# Patient Record
Sex: FEMALE | Race: WHITE | Employment: OTHER | URBAN - METROPOLITAN AREA
[De-identification: names, ages, dates, MRNs, and addresses within clinical notes are randomized per-mention and may not be internally consistent; named-entity substitution may affect disease eponyms.]

---

## 2017-09-05 ENCOUNTER — OFFICE VISIT (OUTPATIENT)
Dept: OBGYN | Age: 62
End: 2017-09-05

## 2017-09-05 VITALS
BODY MASS INDEX: 23.46 KG/M2 | WEIGHT: 146 LBS | DIASTOLIC BLOOD PRESSURE: 72 MMHG | SYSTOLIC BLOOD PRESSURE: 116 MMHG | HEIGHT: 66 IN

## 2017-09-05 DIAGNOSIS — Z12.11 COLON CANCER SCREENING: ICD-10-CM

## 2017-09-05 DIAGNOSIS — Z78.0 POSTMENOPAUSAL: ICD-10-CM

## 2017-09-05 DIAGNOSIS — Z01.419 WELL WOMAN EXAM WITH ROUTINE GYNECOLOGICAL EXAM: Primary | ICD-10-CM

## 2017-09-05 DIAGNOSIS — Z12.31 SCREENING MAMMOGRAM, ENCOUNTER FOR: ICD-10-CM

## 2017-09-05 DIAGNOSIS — Z11.51 SCREENING FOR HPV (HUMAN PAPILLOMAVIRUS): ICD-10-CM

## 2017-09-05 PROCEDURE — 99396 PREV VISIT EST AGE 40-64: CPT | Performed by: OBSTETRICS & GYNECOLOGY

## 2017-09-05 RX ORDER — ALPRAZOLAM 0.5 MG/1
0.5 TABLET ORAL PRN
Qty: 30 TABLET | Refills: 2 | Status: SHIPPED | OUTPATIENT
Start: 2017-09-05 | End: 2018-09-27 | Stop reason: SDUPTHER

## 2017-09-05 RX ORDER — ALPRAZOLAM 0.5 MG/1
0.5 TABLET ORAL PRN
Qty: 30 TABLET | Refills: 1 | Status: CANCELLED | OUTPATIENT
Start: 2017-09-05

## 2017-09-06 LAB — PAP SMEAR: NORMAL

## 2017-09-14 DIAGNOSIS — Z01.419 WELL WOMAN EXAM WITH ROUTINE GYNECOLOGICAL EXAM: ICD-10-CM

## 2017-09-14 DIAGNOSIS — Z11.51 SCREENING FOR HPV (HUMAN PAPILLOMAVIRUS): ICD-10-CM

## 2017-10-24 ENCOUNTER — HOSPITAL ENCOUNTER (OUTPATIENT)
Dept: WOMENS IMAGING | Age: 62
Discharge: HOME | End: 2017-10-24
Payer: COMMERCIAL

## 2017-10-24 DIAGNOSIS — Z78.0 POSTMENOPAUSAL: ICD-10-CM

## 2017-10-24 DIAGNOSIS — Z12.31 SCREENING MAMMOGRAM, ENCOUNTER FOR: ICD-10-CM

## 2017-10-24 PROCEDURE — G0202 SCR MAMMO BI INCL CAD: HCPCS

## 2017-10-24 PROCEDURE — 77080 DXA BONE DENSITY AXIAL: CPT

## 2018-09-11 ENCOUNTER — OFFICE VISIT (OUTPATIENT)
Dept: OBGYN CLINIC | Age: 63
End: 2018-09-11
Payer: MEDICARE

## 2018-09-11 VITALS
HEIGHT: 66 IN | WEIGHT: 142.2 LBS | DIASTOLIC BLOOD PRESSURE: 84 MMHG | SYSTOLIC BLOOD PRESSURE: 138 MMHG | BODY MASS INDEX: 22.85 KG/M2

## 2018-09-11 DIAGNOSIS — Z00.00 PERIODIC HEALTH ASSESSMENT, GENERAL SCREENING, ADULT: ICD-10-CM

## 2018-09-11 DIAGNOSIS — Z12.31 SCREENING MAMMOGRAM, ENCOUNTER FOR: ICD-10-CM

## 2018-09-11 DIAGNOSIS — Z11.51 SCREENING FOR HPV (HUMAN PAPILLOMAVIRUS): ICD-10-CM

## 2018-09-11 DIAGNOSIS — Z01.419 WELL WOMAN EXAM WITH ROUTINE GYNECOLOGICAL EXAM: Primary | ICD-10-CM

## 2018-09-11 PROCEDURE — G0101 CA SCREEN;PELVIC/BREAST EXAM: HCPCS | Performed by: OBSTETRICS & GYNECOLOGY

## 2018-09-11 ASSESSMENT — PATIENT HEALTH QUESTIONNAIRE - PHQ9
1. LITTLE INTEREST OR PLEASURE IN DOING THINGS: 0
SUM OF ALL RESPONSES TO PHQ QUESTIONS 1-9: 0
SUM OF ALL RESPONSES TO PHQ9 QUESTIONS 1 & 2: 0
2. FEELING DOWN, DEPRESSED OR HOPELESS: 0
SUM OF ALL RESPONSES TO PHQ QUESTIONS 1-9: 0

## 2018-09-12 ASSESSMENT — ENCOUNTER SYMPTOMS
COUGH: 0
WHEEZING: 0
CONSTIPATION: 0
BLOOD IN STOOL: 0
VOMITING: 0
COLOR CHANGE: 0
SINUS PRESSURE: 0
SHORTNESS OF BREATH: 0
ABDOMINAL PAIN: 0
NAUSEA: 0

## 2018-09-12 NOTE — PROGRESS NOTES
History and Physical  [facility]   [address]  P: [phone] / F: [phone]  Nida Jarquin  2020              70 y.o. Chief Complaint   Patient presents with    Annual Exam     last pap 17,wnl     Student       /84   Ht 5' 6\" (1.676 m)   Wt 142 lb 3.2 oz (64.5 kg)   BMI 22.95 kg/m²   Alllergies:  Demerol hcl [meperidine]               Primary Care Physician: Babak Davila MD    HPI : Nida Jarquin is a 70 y.o. female [id number] The patient was seen and examined. She has no chief complaint today and is here for her annual exam.  Her bowels are regular. There are no voiding complaints. She denies any bloating. She denies vaginal discharge and was counseled on STD's and the need for barrier contraception. All ?s answered.      ________________________________________________________________________  Obstetric History       T2      L2     SAB1   TAB0   Ectopic0   Molar0   Multiple0   Live Births0       # Outcome Date GA Lbr Tigre/2nd Weight Sex Delivery Anes PTL Lv   3 SAB            2 Term      Vag-Spont      1 Term      Vag-Spont           Past Medical History:   Diagnosis Date    Claustrophobia     Hair loss     Menopause     Multiple lipomas     LIPOMAS/CYST ON HAND                                                                   Past Surgical History:   Procedure Laterality Date    ABDOMINOPLASTY      2004    CATHETER REMOVAL      FIBULA FRACTURE SURGERY      [id number]    FOOT SURGERY      various    FRACTURE SURGERY      right thumb    HIP SURGERY      replacement  right    TUBAL LIGATION      1986    WRIST SURGERY      2 screws     Family History   Problem Relation Age of Onset    Heart Surgery Father         cabg     Social History     Social History    Marital status:      Spouse name: N/A    Number of children: N/A    Years of education: N/A     Occupational History    dentist      Social History Main Topics    Smoking status: Never Smoker    Smokeless tobacco: Never Used    Alcohol use Not on file    Drug use: Unknown    Sexual activity: Yes     Partners: Male     Other Topics Concern    Not on file     Social History Narrative    No narrative on file         MEDICATIONS:  Current Outpatient Prescriptions on File Prior to Visit   Medication Sig Dispense Refill    ALPRAZolam (XANAX) 0.5 MG tablet Take 1 tablet by mouth as needed for Sleep 30 tablet 2    MILK THISTLE PO Take by mouth      Multiple Vitamins-Minerals (B COMPLEX VITAMINS PLUS) TABS Take  by mouth.  Biotin 2500 MCG CAPS Take 5,000 mcg by mouth.  Flaxseed MISC Take one(1) tablet daily.  Chondroitin Sulfate A 150 MG CAPS Take  by mouth.  Ferrous Sulfate (IRON) 325 (65 FE) MG TABS Take  by mouth.  Multiple Vitamin TABS Take  by mouth. No current facility-administered medications on file prior to visit. ALLERGIES:  Allergies as of 09/11/2018 - Review Complete 09/11/2018   Allergen Reaction Noted    Demerol hcl [meperidine] Anaphylaxis 01/13/2015           Gynecologic History:     No LMP recorded. Patient is postmenopausal.      ________________________________________________________________________  REVIEW OF SYSTEMS:       Review of Systems   Constitutional: Negative for chills, fatigue, fever and unexpected weight change. HENT: Negative for hearing loss, sinus pressure and tinnitus. Eyes: Negative for visual disturbance. Respiratory: Negative for cough, shortness of breath and wheezing. Cardiovascular: Negative for chest pain, palpitations and leg swelling. Gastrointestinal: Negative for abdominal pain, blood in stool, constipation, nausea and vomiting. Genitourinary: Negative for difficulty urinating, dysuria, flank pain, hematuria, pelvic pain and vaginal discharge. Musculoskeletal: Negative for arthralgias and neck stiffness.    Skin: Negative for color change, pallor and rash. Allergic/Immunologic: Negative for food allergies. Neurological: Negative for dizziness, speech difficulty, weakness and numbness. Psychiatric/Behavioral: Negative for behavioral problems, self-injury and suicidal ideas. The patient is not nervous/anxious. PHYSICAL Exam:    Constitutional:  Vitals:    09/11/18 0848   BP: 138/84   Weight: 142 lb 3.2 oz (64.5 kg)   Height: 5' 6\" (1.676 m)       Physical Exam   Constitutional: She is oriented to person, place, and time. She appears well-developed and well-nourished. HENT:   Head: Normocephalic and atraumatic. Cardiovascular: Normal rate and regular rhythm. Abdominal: Soft. Normal appearance. There is no tenderness. Musculoskeletal: Normal range of motion. Neurological: She is alert and oriented to person, place, and time. Skin: Skin is warm and intact. No lesion noted. No erythema. Psychiatric: She has a normal mood and affect. Her behavior is normal. Judgment and thought content normal.         ASSESSMENT:      70 y.o. Annual   Diagnosis Orders   1. Well woman exam with routine gynecological exam  PAP SMEAR   2. Screening for HPV (human papillomavirus)  PAP SMEAR   3. Screening mammogram, encounter for  FRANNY DIGITAL SCREEN W CAD BILATERAL   4. Periodic health assessment, general screening, adult  Comprehensive Metabolic Panel    CBC Auto Differential    Lipid Panel    T4, Free    TSH without Reflex    Hemoglobin A1C                  Hereditary Breast, Ovarian, Colon and Uterine Cancer screening Done. Tobacco & Secondary smoke risks reviewed; instructed on cessation and avoidance      Counseling Completed:          PLAN:    Return in about 1 year (around 9/11/2019) for annual.  Repeat Annual every 1 year  Cervical Cytology Evaluation begins at 24years old.   If Future     Standing Expiration Date:   9/11/2019     Order Specific Question:   Is Patient Fasting?/# of Hours     Answer:   8    T4, Free     Standing Status:   Future     Standing Expiration Date:   9/11/2019    TSH without Reflex     Standing Status:   Future     Standing Expiration Date:   9/11/2019    Hemoglobin A1C     Standing Status:   Future     Standing Expiration Date:   9/11/2019     No orders of the defined types were placed in this encounter. IHollie, am scribing for, and in the presence of, Dr Rena Crawley. Electronically signed by: Hollie Hogan 9/12/18 10:06 AM    IDr Rena, personally performed the services described in this documentation, as scribed by Hollie Hogan in my presence, and it is both accurate and complete.  Electronically signed by: Rena Crawley MD 9/12/18 10:06 AM

## 2018-09-20 DIAGNOSIS — Z01.419 WELL WOMAN EXAM WITH ROUTINE GYNECOLOGICAL EXAM: ICD-10-CM

## 2018-09-20 DIAGNOSIS — Z11.51 SCREENING FOR HPV (HUMAN PAPILLOMAVIRUS): ICD-10-CM

## 2018-09-27 DIAGNOSIS — F41.9 ANXIETY: Primary | ICD-10-CM

## 2018-10-01 RX ORDER — ALPRAZOLAM 0.5 MG/1
0.5 TABLET ORAL PRN
Qty: 30 TABLET | Refills: 2 | Status: SHIPPED | OUTPATIENT
Start: 2018-10-01 | End: 2021-10-05 | Stop reason: SDUPTHER

## 2019-09-18 ENCOUNTER — OFFICE VISIT (OUTPATIENT)
Dept: OBGYN CLINIC | Age: 64
End: 2019-09-18

## 2019-09-18 VITALS
DIASTOLIC BLOOD PRESSURE: 72 MMHG | BODY MASS INDEX: 21.83 KG/M2 | HEIGHT: 65 IN | SYSTOLIC BLOOD PRESSURE: 100 MMHG | WEIGHT: 131 LBS

## 2019-09-18 DIAGNOSIS — F32.A DEPRESSION, UNSPECIFIED DEPRESSION TYPE: ICD-10-CM

## 2019-09-18 DIAGNOSIS — F51.02 ADJUSTMENT INSOMNIA: ICD-10-CM

## 2019-09-18 DIAGNOSIS — Z01.419 ENCOUNTER FOR WELL WOMAN EXAM: Primary | ICD-10-CM

## 2019-09-18 DIAGNOSIS — L65.9 HAIR LOSS: ICD-10-CM

## 2019-09-18 DIAGNOSIS — Z12.31 SCREENING MAMMOGRAM, ENCOUNTER FOR: ICD-10-CM

## 2019-09-18 PROCEDURE — G0101 CA SCREEN;PELVIC/BREAST EXAM: HCPCS | Performed by: OBSTETRICS & GYNECOLOGY

## 2019-09-18 RX ORDER — ALPRAZOLAM 0.5 MG/1
0.5 TABLET ORAL 3 TIMES DAILY PRN
Qty: 30 TABLET | Refills: 3 | Status: SHIPPED | OUTPATIENT
Start: 2019-09-18 | End: 2020-09-30 | Stop reason: SDUPTHER

## 2019-09-18 RX ORDER — LISINOPRIL 10 MG/1
TABLET ORAL
Refills: 3 | COMMUNITY
Start: 2019-07-09

## 2019-09-18 RX ORDER — PROPAFENONE HYDROCHLORIDE 150 MG/1
TABLET, FILM COATED ORAL
Refills: 11 | COMMUNITY
Start: 2019-09-02 | End: 2020-09-29

## 2019-09-18 RX ORDER — APIXABAN 5 MG/1
TABLET, FILM COATED ORAL
Refills: 11 | COMMUNITY
Start: 2019-08-07

## 2019-09-18 ASSESSMENT — ENCOUNTER SYMPTOMS
ALLERGIC/IMMUNOLOGIC NEGATIVE: 1
CONSTIPATION: 0
RECTAL PAIN: 0
NAUSEA: 0
EYES NEGATIVE: 1
ANAL BLEEDING: 0
BLOOD IN STOOL: 0
RESPIRATORY NEGATIVE: 1
ABDOMINAL PAIN: 0
ABDOMINAL DISTENTION: 0
DIARRHEA: 0
VOMITING: 0

## 2019-09-18 ASSESSMENT — PATIENT HEALTH QUESTIONNAIRE - PHQ9
1. LITTLE INTEREST OR PLEASURE IN DOING THINGS: 0
SUM OF ALL RESPONSES TO PHQ QUESTIONS 1-9: 0
SUM OF ALL RESPONSES TO PHQ9 QUESTIONS 1 & 2: 0
SUM OF ALL RESPONSES TO PHQ QUESTIONS 1-9: 0
2. FEELING DOWN, DEPRESSED OR HOPELESS: 0

## 2019-09-18 NOTE — PROGRESS NOTES
Cardiovascular: Negative. Gastrointestinal: Negative for abdominal distention, abdominal pain, anal bleeding, blood in stool, constipation, diarrhea, nausea, rectal pain and vomiting. Endocrine: Negative. Genitourinary: Negative for decreased urine volume, difficulty urinating, dyspareunia, dysuria, enuresis, flank pain, frequency, genital sores, hematuria, menstrual problem, pelvic pain, urgency, vaginal bleeding, vaginal discharge and vaginal pain. Musculoskeletal: Negative. Skin: Negative. Allergic/Immunologic: Negative. Neurological: Negative. Hematological: Negative. Psychiatric/Behavioral: Negative.    + hair loss and insomnia    Objective:     Physical Exam   Constitutional: She is oriented to person, place, and time. She appears well-developed and well-nourished. HENT:   Head: Normocephalic. Neck: Normal range of motion. Neck supple. No thyromegaly present. Cardiovascular: Normal rate, regular rhythm and normal heart sounds. Pulmonary/Chest: Effort normal and breath sounds normal. No respiratory distress. She has no wheezes. She has no rales. She exhibits no tenderness. Right breast exhibits no mass, no nipple discharge, no skin change and no tenderness. Left breast exhibits no mass, no nipple discharge, no skin change and no tenderness. No breast swelling, tenderness, discharge or bleeding. Abdominal: Soft. Bowel sounds are normal. She exhibits no distension and no mass. There is no tenderness. There is no rebound and no guarding. Hernia confirmed negative in the right inguinal area and confirmed negative in the left inguinal area. Genitourinary: Rectum normal, vagina normal and uterus normal. No breast swelling, tenderness, discharge or bleeding. No labial fusion. There is no rash, tenderness, lesion or injury on the right labia. There is no rash, tenderness, lesion or injury on the left labia. Uterus is not deviated, not enlarged, not fixed and not tender.  Cervix exhibits no motion tenderness, no discharge and no friability. Right adnexum displays no mass, no tenderness and no fullness. Left adnexum displays no mass, no tenderness and no fullness. No erythema, tenderness or bleeding in the vagina. No foreign body in the vagina. No signs of injury around the vagina. No vaginal discharge found. Musculoskeletal: Normal range of motion. She exhibits no edema or tenderness. Lymphadenopathy:     She has no cervical adenopathy. Right: No inguinal adenopathy present. Left: No inguinal adenopathy present. Neurological: She is alert and oriented to person, place, and time. Skin: Skin is warm and dry. No rash noted. No erythema. No pallor. Psychiatric: She has a normal mood and affect. Her behavior is normal. Judgment and thought content normal.       Assessment:      Diagnosis Orders   1. Encounter for well woman exam     2. Screening mammogram, encounter for  FRANNY DIGITAL SCREEN W CAD BILATERAL   3. Depression, unspecified depression type  ALPRAZolam (XANAX) 0.5 MG tablet   4. Hair loss     5. Adjustment insomnia           Plan:      Medications placedthis encounter:  Orders Placed This Encounter   Medications    ALPRAZolam (XANAX) 0.5 MG tablet     Sig: Take 1 tablet by mouth 3 times daily as needed for Sleep or Anxiety for up to 30 days. Dispense:  30 tablet     Refill:  3         Orders placedthis encounter:  Orders Placed This Encounter   Procedures    FRANNY DIGITAL SCREEN W CAD BILATERAL     Standing Status:   Future     Standing Expiration Date:   9/17/2020         Follow up:  Return in about 1 year (around 9/18/2020) for Annual.   Repeat Annual GYN exam every 1 year. Cervical Cytology exam starts age 24. If Negative Cytology;  follow-up screening per current guidelines. Mammograms yearly starting at age 36. Calcium and Vitamin D dosing reviewed ( age appropriate ). Colonoscopy and bone density screening discussed ( age appropriate ). Birth control and STD prevention discussed ( age appropriate ). Gardisil counseling completed for all patients 7-33 yo. Routine health maintenance ( per PCP and guidelines ).

## 2019-10-24 ENCOUNTER — TELEPHONE (OUTPATIENT)
Dept: OBGYN CLINIC | Age: 64
End: 2019-10-24

## 2019-10-25 DIAGNOSIS — Z12.11 ENCOUNTER FOR SCREENING COLONOSCOPY: Primary | ICD-10-CM

## 2019-12-18 ENCOUNTER — TELEPHONE (OUTPATIENT)
Dept: ADMINISTRATIVE | Age: 64
End: 2019-12-18

## 2020-07-01 ENCOUNTER — HOSPITAL ENCOUNTER (OUTPATIENT)
Dept: WOMENS IMAGING | Age: 65
Discharge: HOME | End: 2020-07-03
Payer: MEDICARE

## 2020-07-01 PROCEDURE — 77067 SCR MAMMO BI INCL CAD: CPT

## 2020-09-29 ENCOUNTER — OFFICE VISIT (OUTPATIENT)
Dept: OBGYN CLINIC | Age: 65
End: 2020-09-29
Payer: MEDICARE

## 2020-09-29 VITALS — DIASTOLIC BLOOD PRESSURE: 72 MMHG | SYSTOLIC BLOOD PRESSURE: 122 MMHG | BODY MASS INDEX: 23.13 KG/M2 | WEIGHT: 139 LBS

## 2020-09-29 PROCEDURE — G0101 CA SCREEN;PELVIC/BREAST EXAM: HCPCS | Performed by: OBSTETRICS & GYNECOLOGY

## 2020-09-29 RX ORDER — ROSUVASTATIN CALCIUM 10 MG/1
TABLET, COATED ORAL
COMMUNITY
Start: 2020-08-24

## 2020-09-29 RX ORDER — OXYCODONE HYDROCHLORIDE AND ACETAMINOPHEN 5; 325 MG/1; MG/1
TABLET ORAL
COMMUNITY
Start: 2020-09-16 | End: 2021-10-05

## 2020-09-29 RX ORDER — GABAPENTIN 100 MG/1
CAPSULE ORAL
COMMUNITY
Start: 2020-09-24 | End: 2021-10-05

## 2020-09-29 ASSESSMENT — ENCOUNTER SYMPTOMS
CONSTIPATION: 0
EYES NEGATIVE: 1
ABDOMINAL PAIN: 0
VOMITING: 0
RECTAL PAIN: 0
ANAL BLEEDING: 0
ABDOMINAL DISTENTION: 0
ALLERGIC/IMMUNOLOGIC NEGATIVE: 1
RESPIRATORY NEGATIVE: 1
DIARRHEA: 0
NAUSEA: 0
BLOOD IN STOOL: 0

## 2020-09-29 ASSESSMENT — PATIENT HEALTH QUESTIONNAIRE - PHQ9
2. FEELING DOWN, DEPRESSED OR HOPELESS: 0
SUM OF ALL RESPONSES TO PHQ9 QUESTIONS 1 & 2: 0
SUM OF ALL RESPONSES TO PHQ QUESTIONS 1-9: 0
1. LITTLE INTEREST OR PLEASURE IN DOING THINGS: 0
SUM OF ALL RESPONSES TO PHQ QUESTIONS 1-9: 0

## 2020-09-29 ASSESSMENT — VISUAL ACUITY: OU: 1

## 2020-09-29 NOTE — PROGRESS NOTES
Subjective:      Patient ID: Danielle Horton is a 77 y.o. female    Annual exam.  No PMB. No GYN complaints. Pap deferred. Screening mammogram ordered. Monthly SBE encouraged. Dexa scan ordered per protocol. Screening colonoscopy recommended per routine. F/U annual exam or prn. Vitals: There were no vitals taken for this visit. Past Medical History:   Diagnosis Date    Atrial fibrillation ([address]     Claustrophobia     Hair loss     Menopause     Multiple lipomas     LIPOMAS/CYST ON HAND     Past Surgical History:   Procedure Laterality Date    ABDOMINOPLASTY      2004    CATHETER REMOVAL      FIBULA FRACTURE SURGERY      [id number]    FOOT SURGERY      various    FRACTURE SURGERY      right thumb    HIP SURGERY      replacement 2006 right    TUBAL LIGATION      1986    WRIST SURGERY      2 screws     Allergies:  Demerol hcl [meperidine]  Current Outpatient Medications   Medication Sig Dispense Refill    KRILL OIL PO Take by mouth      lisinopril (PRINIVIL;ZESTRIL) 10 MG tablet TAKE 1 TABLET BY MOUTH DAILY  3    propafenone (RYTHMOL) 150 MG tablet TAKE 1 TABLET EVERY 8 HOURS DAILY. 11    ELIQUIS 5 MG TABS tablet TAKE 1 TABLET BY MOUTH TWICE DAILY  11    TURMERIC PO Take by mouth      Multiple Vitamins-Minerals (B COMPLEX VITAMINS PLUS) TABS Take  by mouth.  Biotin 2500 MCG CAPS Take 5,000 mcg by mouth.  Ferrous Sulfate (IRON) 325 (65 FE) MG TABS Take  by mouth.  Multiple Vitamin TABS Take  by mouth. No current facility-administered medications for this visit. Social History     Socioeconomic History    Marital status:       Spouse name: Not on file    Number of children: Not on file    Years of education: Not on file    Highest education level: Not on file   Occupational History    Occupation: dentist   Social Needs    Financial resource strain: Not on file    Food insecurity     Worry: Not on file     Inability: Not on file   [email or web address] needs Medical: Not on file     Non-medical: Not on file   Tobacco Use    Smoking status: Never Smoker    Smokeless tobacco: Never Used   Substance and Sexual Activity    Alcohol use: Not on file    Drug use: Not on file    Sexual activity: Yes     Partners: Male   Lifestyle    Physical activity     Days per week: Not on file     Minutes per session: Not on file    Stress: Not on file   Relationships    Social connections     Talks on phone: Not on file     Gets together: Not on file     Attends Rastafarian service: Not on file     Active member of club or organization: Not on file     Attends meetings of clubs or organizations: Not on file     Relationship status: Not on file    Intimate partner violence     Fear of current or ex partner: Not on file     Emotionally abused: Not on file     Physically abused: Not on file     Forced sexual activity: Not on file   Other Topics Concern    Not on file   Social History Narrative    Not on file     Family History   Problem Relation Age of Onset    Heart Surgery Father         cabg       Review of Systems   Constitutional: Negative. Negative for activity change, appetite change, chills, diaphoresis, fatigue, fever and unexpected weight change. HENT: Negative. Eyes: Negative. Respiratory: Negative. Cardiovascular: Negative. Gastrointestinal: Negative for abdominal distention, abdominal pain, anal bleeding, blood in stool, constipation, diarrhea, nausea, rectal pain and vomiting. Endocrine: Negative. Genitourinary: Negative for decreased urine volume, difficulty urinating, dyspareunia, dysuria, enuresis, flank pain, frequency, genital sores, hematuria, menstrual problem, pelvic pain, urgency, vaginal bleeding, vaginal discharge and vaginal pain. Musculoskeletal: Negative. Skin: Negative. Allergic/Immunologic: Negative. Neurological: Negative. Hematological: Negative. Psychiatric/Behavioral: Negative.         Objective:     Physical

## 2020-09-29 NOTE — PROGRESS NOTES
The patient was asked if she would like a chaperone present for her intimate exam. She  declines the chaperone.  Danial

## 2020-09-30 RX ORDER — ALPRAZOLAM 0.5 MG/1
0.5 TABLET ORAL 3 TIMES DAILY PRN
Qty: 30 TABLET | Refills: 3 | Status: SHIPPED | OUTPATIENT
Start: 2020-09-30 | End: 2020-10-30

## 2020-11-06 ENCOUNTER — HOSPITAL ENCOUNTER (OUTPATIENT)
Dept: WOMENS IMAGING | Age: 65
Discharge: HOME | End: 2020-11-08
Payer: MEDICARE

## 2020-11-06 PROCEDURE — 77080 DXA BONE DENSITY AXIAL: CPT

## 2020-12-01 ENCOUNTER — OFFICE VISIT (OUTPATIENT)
Dept: OBGYN CLINIC | Age: 65
End: 2020-12-01
Payer: MEDICARE

## 2020-12-01 VITALS — BODY MASS INDEX: 23.13 KG/M2 | HEIGHT: 65 IN | SYSTOLIC BLOOD PRESSURE: 132 MMHG | DIASTOLIC BLOOD PRESSURE: 88 MMHG

## 2020-12-01 DIAGNOSIS — Z12.31 SCREENING MAMMOGRAM, ENCOUNTER FOR: ICD-10-CM

## 2020-12-01 DIAGNOSIS — Z00.00 PERIODIC HEALTH ASSESSMENT, GENERAL SCREENING, ADULT: ICD-10-CM

## 2020-12-01 LAB
ALBUMIN SERPL-MCNC: 4.5 G/DL (ref 3.5–4.6)
ALP BLD-CCNC: 102 U/L (ref 40–130)
ALT SERPL-CCNC: 20 U/L (ref 0–33)
ANION GAP SERPL CALCULATED.3IONS-SCNC: 11 MEQ/L (ref 9–15)
AST SERPL-CCNC: 30 U/L (ref 0–35)
BASOPHILS ABSOLUTE: 0.1 K/UL (ref 0–0.2)
BASOPHILS RELATIVE PERCENT: 1.3 %
BILIRUB SERPL-MCNC: 0.7 MG/DL (ref 0.2–0.7)
BUN BLDV-MCNC: 12 MG/DL (ref 8–23)
CALCIUM SERPL-MCNC: 10 MG/DL (ref 8.5–9.9)
CHLORIDE BLD-SCNC: 96 MEQ/L (ref 95–107)
CHOLESTEROL, TOTAL: 221 MG/DL (ref 0–199)
CO2: 27 MEQ/L (ref 20–31)
CREAT SERPL-MCNC: 0.62 MG/DL (ref 0.5–0.9)
EOSINOPHILS ABSOLUTE: 0.2 K/UL (ref 0–0.7)
EOSINOPHILS RELATIVE PERCENT: 3.5 %
GFR AFRICAN AMERICAN: >60
GFR NON-AFRICAN AMERICAN: >60
GLOBULIN: 2.3 G/DL (ref 2.3–3.5)
GLUCOSE BLD-MCNC: 56 MG/DL (ref 70–99)
HCT VFR BLD CALC: 39.5 % (ref 37–47)
HDLC SERPL-MCNC: 133 MG/DL (ref 40–59)
HEMOGLOBIN: 13.4 G/DL (ref 12–16)
LDL CHOLESTEROL CALCULATED: 77 MG/DL (ref 0–129)
LYMPHOCYTES ABSOLUTE: 1.4 K/UL (ref 1–4.8)
LYMPHOCYTES RELATIVE PERCENT: 22.9 %
MCH RBC QN AUTO: 32.3 PG (ref 27–31.3)
MCHC RBC AUTO-ENTMCNC: 33.8 % (ref 33–37)
MCV RBC AUTO: 95.5 FL (ref 82–100)
MONOCYTES ABSOLUTE: 0.7 K/UL (ref 0.2–0.8)
MONOCYTES RELATIVE PERCENT: 11.4 %
NEUTROPHILS ABSOLUTE: 3.8 K/UL (ref 1.4–6.5)
NEUTROPHILS RELATIVE PERCENT: 60.9 %
PDW BLD-RTO: 14.3 % (ref 11.5–14.5)
PLATELET # BLD: 334 K/UL (ref 130–400)
POTASSIUM SERPL-SCNC: 4.9 MEQ/L (ref 3.4–4.9)
RBC # BLD: 4.13 M/UL (ref 4.2–5.4)
SODIUM BLD-SCNC: 134 MEQ/L (ref 135–144)
TOTAL PROTEIN: 6.8 G/DL (ref 6.3–8)
TRIGL SERPL-MCNC: 56 MG/DL (ref 0–150)
WBC # BLD: 6.2 K/UL (ref 4.8–10.8)

## 2020-12-01 PROCEDURE — 1090F PRES/ABSN URINE INCON ASSESS: CPT | Performed by: OBSTETRICS & GYNECOLOGY

## 2020-12-01 PROCEDURE — 3017F COLORECTAL CA SCREEN DOC REV: CPT | Performed by: OBSTETRICS & GYNECOLOGY

## 2020-12-01 PROCEDURE — G8427 DOCREV CUR MEDS BY ELIG CLIN: HCPCS | Performed by: OBSTETRICS & GYNECOLOGY

## 2020-12-01 PROCEDURE — 1123F ACP DISCUSS/DSCN MKR DOCD: CPT | Performed by: OBSTETRICS & GYNECOLOGY

## 2020-12-01 PROCEDURE — 1036F TOBACCO NON-USER: CPT | Performed by: OBSTETRICS & GYNECOLOGY

## 2020-12-01 PROCEDURE — 99213 OFFICE O/P EST LOW 20 MIN: CPT | Performed by: OBSTETRICS & GYNECOLOGY

## 2020-12-01 PROCEDURE — 4040F PNEUMOC VAC/ADMIN/RCVD: CPT | Performed by: OBSTETRICS & GYNECOLOGY

## 2020-12-01 PROCEDURE — G8484 FLU IMMUNIZE NO ADMIN: HCPCS | Performed by: OBSTETRICS & GYNECOLOGY

## 2020-12-01 PROCEDURE — G8420 CALC BMI NORM PARAMETERS: HCPCS | Performed by: OBSTETRICS & GYNECOLOGY

## 2020-12-01 PROCEDURE — G8399 PT W/DXA RESULTS DOCUMENT: HCPCS | Performed by: OBSTETRICS & GYNECOLOGY

## 2020-12-01 RX ORDER — ALENDRONATE SODIUM 35 MG/1
35 TABLET ORAL
Qty: 12 TABLET | Refills: 3 | Status: SHIPPED | OUTPATIENT
Start: 2020-12-01 | End: 2022-10-18 | Stop reason: ALTCHOICE

## 2020-12-01 ASSESSMENT — ENCOUNTER SYMPTOMS
NAUSEA: 0
ABDOMINAL DISTENTION: 0
VOMITING: 0
ABDOMINAL PAIN: 0
ALLERGIC/IMMUNOLOGIC NEGATIVE: 1
RECTAL PAIN: 0
CONSTIPATION: 0
EYES NEGATIVE: 1
DIARRHEA: 0
ANAL BLEEDING: 0
RESPIRATORY NEGATIVE: 1
BLOOD IN STOOL: 0

## 2020-12-01 NOTE — PROGRESS NOTES
Subjective:      Patient ID: Magdy Bailey is a 77 y.o. female    Pt here to discuss Dexa results. Lowest T score -2.5 ( average -2.3 ).  2017 avergae T score -1.7. Discussed osteopenia and risks of spontaneous fracture from osteopenia/osteoprorosis. Discussed prevention measures including Vitamin D and Calcium supplements, weight bearing exercise and meds with risks and benefits. Pt has opted to start Boniva as directed. F/U annual exam or prn. Vitals:  /88   Ht 5' 5\" (1.651 m)   BMI 23.13 kg/m²   Past Medical History:   Diagnosis Date    Atrial fibrillation (HCC)     Claustrophobia     Hair loss     Menopause     Multiple lipomas     LIPOMAS/CYST ON HAND     Past Surgical History:   Procedure Laterality Date    ABDOMINOPLASTY      2004    CATHETER REMOVAL      FIBULA FRACTURE SURGERY      [id number]    FOOT SURGERY      various    FRACTURE SURGERY      right thumb    HIP SURGERY      replacement 2006 right    KNEE SURGERY  09/15/2020    left knee    TUBAL LIGATION      1986    WRIST SURGERY      2 screws     Allergies:  Demerol hcl [meperidine]  Current Outpatient Medications   Medication Sig Dispense Refill    alendronate (FOSAMAX) 35 MG tablet Take 1 tablet by mouth every 7 days 12 tablet 3    oxyCODONE-acetaminophen (PERCOCET) 5-325 MG per tablet TAKE 1 TABLET EVERY 6 HOURS AS NEEDED FOR PAIN FOR 10 DAYS max 4 tablets per day      rosuvastatin (CRESTOR) 10 MG tablet TAKE 1 TABLET BY MOUTH TWICE A WEEK      gabapentin (NEURONTIN) 100 MG capsule TAKE 1 CAPSULE 3 times daily      KRILL OIL PO Take by mouth      lisinopril (PRINIVIL;ZESTRIL) 10 MG tablet TAKE 1 TABLET BY MOUTH DAILY  3    ELIQUIS 5 MG TABS tablet TAKE 1 TABLET BY MOUTH TWICE DAILY  11    TURMERIC PO Take by mouth      Biotin 2500 MCG CAPS Take 5,000 mcg by mouth.  Ferrous Sulfate (IRON) 325 (65 FE) MG TABS Take  by mouth. No current facility-administered medications for this visit.       Social History     Socioeconomic History    Marital status:      Spouse name: Not on file    Number of children: Not on file    Years of education: Not on file    Highest education level: Not on file   Occupational History    Occupation: dentist   Social Needs    Financial resource strain: Not on file    Food insecurity     Worry: Not on file     Inability: Not on file    Transportation needs     Medical: Not on file     Non-medical: Not on file   Tobacco Use    Smoking status: Never Smoker    Smokeless tobacco: Never Used   Substance and Sexual Activity    Alcohol use: Not Currently    Drug use: Yes     Types: Opiates      Comment: oxycodone for knee    Sexual activity: Not Currently   Lifestyle    Physical activity     Days per week: Not on file     Minutes per session: Not on file    Stress: Not on file   Relationships    Social connections     Talks on phone: Not on file     Gets together: Not on file     Attends Religion service: Not on file     Active member of club or organization: Not on file     Attends meetings of clubs or organizations: Not on file     Relationship status: Not on file    Intimate partner violence     Fear of current or ex partner: Not on file     Emotionally abused: Not on file     Physically abused: Not on file     Forced sexual activity: Not on file   Other Topics Concern    Not on file   Social History Narrative    Not on file     Family History   Problem Relation Age of Onset    Heart Surgery Father         cabg       Review of Systems   Constitutional: Negative. Negative for activity change, appetite change, chills, diaphoresis, fatigue, fever and unexpected weight change. HENT: Negative. Eyes: Negative. Respiratory: Negative. Cardiovascular: Negative. Gastrointestinal: Negative for abdominal distention, abdominal pain, anal bleeding, blood in stool, constipation, diarrhea, nausea, rectal pain and vomiting. Endocrine: Negative.     Genitourinary: Negative for decreased urine volume, difficulty urinating, dyspareunia, dysuria, enuresis, flank pain, frequency, genital sores, hematuria, menstrual problem, pelvic pain, urgency, vaginal bleeding, vaginal discharge and vaginal pain. Musculoskeletal: Negative. Skin: Negative. Allergic/Immunologic: Negative. Neurological: Negative. Hematological: Negative. Psychiatric/Behavioral: Negative. Objective:     Physical Exam  Constitutional:       General: She is not in acute distress. Appearance: She is well-developed. She is not diaphoretic. HENT:      Head: Normocephalic and atraumatic. Eyes:      Conjunctiva/sclera: Conjunctivae normal.   Neck:      Musculoskeletal: Normal range of motion and neck supple. Cardiovascular:      Rate and Rhythm: Normal rate and regular rhythm. Pulmonary:      Effort: Pulmonary effort is normal. No respiratory distress. Musculoskeletal: Normal range of motion. General: No tenderness or deformity. Skin:     General: Skin is warm and dry. Coloration: Skin is not pale. Neurological:      Mental Status: She is alert and oriented to person, place, and time. Motor: No abnormal muscle tone. Coordination: Coordination normal.   Psychiatric:         Behavior: Behavior normal.         Thought Content: Thought content normal.         Judgment: Judgment normal.         Assessment:       Diagnosis Orders   1. Osteopenia, unspecified location     2.  Periodic health assessment, general screening, adult  CBC Auto Differential    Comprehensive Metabolic Panel    Lipid Panel        Plan:      Medications placedthis encounter:  Orders Placed This Encounter   Medications    alendronate (FOSAMAX) 35 MG tablet     Sig: Take 1 tablet by mouth every 7 days     Dispense:  12 tablet     Refill:  3         Orders placedthis encounter:  Orders Placed This Encounter   Procedures    CBC Auto Differential     Standing Status:   Future     Standing Expiration Date:   12/1/2021    Comprehensive Metabolic Panel     Standing Status:   Future     Standing Expiration Date:   12/1/2021    Lipid Panel     Standing Status:   Future     Standing Expiration Date:   12/1/2021     Order Specific Question:   Is Patient Fasting?/# of Hours     Answer:   12         Follow up:  Return for Annual.

## 2020-12-11 ENCOUNTER — TELEPHONE (OUTPATIENT)
Dept: OBGYN CLINIC | Age: 65
End: 2020-12-11

## 2020-12-11 NOTE — TELEPHONE ENCOUNTER
LMOM for pt to c/b to make aware cholesterol and HDL is elevated and needs to see PCP, please find out PCP and will fax records over

## 2020-12-11 NOTE — TELEPHONE ENCOUNTER
Patient returning call and was made aware of results. Pateint requesting a copy be mailed to her home address. Patient confirmed her PCP Philip Macario on file is correct.

## 2021-07-20 ENCOUNTER — HOSPITAL ENCOUNTER (OUTPATIENT)
Dept: WOMENS IMAGING | Age: 66
Discharge: HOME | End: 2021-07-22
Payer: MEDICARE

## 2021-07-20 VITALS — HEIGHT: 65 IN | BODY MASS INDEX: 23.13 KG/M2

## 2021-07-20 DIAGNOSIS — Z12.31 SCREENING MAMMOGRAM, ENCOUNTER FOR: ICD-10-CM

## 2021-07-20 PROCEDURE — 77063 BREAST TOMOSYNTHESIS BI: CPT

## 2021-10-05 ENCOUNTER — OFFICE VISIT (OUTPATIENT)
Dept: OBGYN CLINIC | Age: 66
End: 2021-10-05
Payer: MEDICARE

## 2021-10-05 VITALS
BODY MASS INDEX: 24.59 KG/M2 | HEIGHT: 64 IN | DIASTOLIC BLOOD PRESSURE: 70 MMHG | SYSTOLIC BLOOD PRESSURE: 118 MMHG | WEIGHT: 144 LBS

## 2021-10-05 DIAGNOSIS — Z01.419 ENCOUNTER FOR WELL WOMAN EXAM WITH ROUTINE GYNECOLOGICAL EXAM: Primary | ICD-10-CM

## 2021-10-05 DIAGNOSIS — Z12.31 SCREENING MAMMOGRAM, ENCOUNTER FOR: ICD-10-CM

## 2021-10-05 DIAGNOSIS — F32.A DEPRESSION, UNSPECIFIED DEPRESSION TYPE: ICD-10-CM

## 2021-10-05 DIAGNOSIS — F41.9 ANXIETY: ICD-10-CM

## 2021-10-05 DIAGNOSIS — K22.2 ESOPHAGEAL STRICTURE: ICD-10-CM

## 2021-10-05 DIAGNOSIS — R10.9 ABDOMINAL PAIN, UNSPECIFIED ABDOMINAL LOCATION: ICD-10-CM

## 2021-10-05 DIAGNOSIS — R63.5 WEIGHT GAIN: ICD-10-CM

## 2021-10-05 PROCEDURE — G0101 CA SCREEN;PELVIC/BREAST EXAM: HCPCS | Performed by: OBSTETRICS & GYNECOLOGY

## 2021-10-05 PROCEDURE — G8484 FLU IMMUNIZE NO ADMIN: HCPCS | Performed by: OBSTETRICS & GYNECOLOGY

## 2021-10-05 RX ORDER — ALPRAZOLAM 0.5 MG/1
0.5 TABLET ORAL NIGHTLY PRN
Qty: 30 TABLET | Refills: 2 | Status: SHIPPED | OUTPATIENT
Start: 2021-10-05 | End: 2021-11-04

## 2021-10-05 ASSESSMENT — ENCOUNTER SYMPTOMS
BLOOD IN STOOL: 0
CONSTIPATION: 0
ANAL BLEEDING: 0
VOMITING: 0
EYES NEGATIVE: 1
DIARRHEA: 0
NAUSEA: 0
ALLERGIC/IMMUNOLOGIC NEGATIVE: 1
RECTAL PAIN: 0
ABDOMINAL DISTENTION: 0
RESPIRATORY NEGATIVE: 1
ABDOMINAL PAIN: 0

## 2021-10-05 ASSESSMENT — PATIENT HEALTH QUESTIONNAIRE - PHQ9
2. FEELING DOWN, DEPRESSED OR HOPELESS: 0
1. LITTLE INTEREST OR PLEASURE IN DOING THINGS: 0
SUM OF ALL RESPONSES TO PHQ QUESTIONS 1-9: 0
SUM OF ALL RESPONSES TO PHQ QUESTIONS 1-9: 0
SUM OF ALL RESPONSES TO PHQ9 QUESTIONS 1 & 2: 0
SUM OF ALL RESPONSES TO PHQ QUESTIONS 1-9: 0

## 2021-10-05 ASSESSMENT — VISUAL ACUITY: OU: 1

## 2021-10-14 ENCOUNTER — TELEPHONE (OUTPATIENT)
Dept: OBGYN CLINIC | Age: 66
End: 2021-10-14

## 2021-10-14 ENCOUNTER — NURSE ONLY (OUTPATIENT)
Dept: OBGYN CLINIC | Age: 66
End: 2021-10-14
Payer: MEDICARE

## 2021-10-14 DIAGNOSIS — R10.9 ABDOMINAL PAIN, UNSPECIFIED ABDOMINAL LOCATION: ICD-10-CM

## 2021-10-14 DIAGNOSIS — R63.5 WEIGHT GAIN: ICD-10-CM

## 2021-10-14 LAB — TSH REFLEX: 3.93 UIU/ML (ref 0.44–3.86)

## 2021-10-14 PROCEDURE — 36415 COLL VENOUS BLD VENIPUNCTURE: CPT | Performed by: OBSTETRICS & GYNECOLOGY

## 2021-10-14 NOTE — TELEPHONE ENCOUNTER
Pt seen results come through. She could not get on mychart and called to see if she could get help getting on mychart.  Gave results to TSH, pt aware needs to see PCP or Hallie gonzalez helped her get on mychart

## 2021-11-05 ENCOUNTER — TELEPHONE (OUTPATIENT)
Dept: OBGYN CLINIC | Age: 66
End: 2021-11-05

## 2021-11-05 DIAGNOSIS — R79.89 ELEVATED TSH: Primary | ICD-10-CM

## 2021-11-05 DIAGNOSIS — R68.89 OTHER GENERAL SYMPTOMS AND SIGNS: ICD-10-CM

## 2022-10-18 ENCOUNTER — OFFICE VISIT (OUTPATIENT)
Dept: OBGYN CLINIC | Age: 67
End: 2022-10-18
Payer: MEDICARE

## 2022-10-18 VITALS
BODY MASS INDEX: 24.92 KG/M2 | SYSTOLIC BLOOD PRESSURE: 120 MMHG | DIASTOLIC BLOOD PRESSURE: 82 MMHG | HEIGHT: 64 IN | WEIGHT: 146 LBS

## 2022-10-18 DIAGNOSIS — Z01.419 ENCOUNTER FOR WELL WOMAN EXAM WITH ROUTINE GYNECOLOGICAL EXAM: Primary | ICD-10-CM

## 2022-10-18 DIAGNOSIS — R21 RASH: ICD-10-CM

## 2022-10-18 DIAGNOSIS — Z12.31 SCREENING MAMMOGRAM FOR BREAST CANCER: ICD-10-CM

## 2022-10-18 PROCEDURE — G8484 FLU IMMUNIZE NO ADMIN: HCPCS | Performed by: OBSTETRICS & GYNECOLOGY

## 2022-10-18 PROCEDURE — G0101 CA SCREEN;PELVIC/BREAST EXAM: HCPCS | Performed by: OBSTETRICS & GYNECOLOGY

## 2022-10-18 RX ORDER — METHYLPREDNISOLONE 4 MG/1
TABLET ORAL
Qty: 1 KIT | Refills: 0 | Status: SHIPPED | OUTPATIENT
Start: 2022-10-18 | End: 2022-10-24

## 2022-10-18 RX ORDER — ALPRAZOLAM 0.5 MG/1
TABLET ORAL
COMMUNITY
Start: 2022-08-19

## 2022-10-18 RX ORDER — PROPAFENONE HYDROCHLORIDE 150 MG/1
TABLET, FILM COATED ORAL
COMMUNITY
Start: 2021-02-17

## 2022-10-18 ASSESSMENT — ENCOUNTER SYMPTOMS
NAUSEA: 0
ABDOMINAL PAIN: 0
ALLERGIC/IMMUNOLOGIC NEGATIVE: 1
VOMITING: 0
CONSTIPATION: 0
BLOOD IN STOOL: 0
ABDOMINAL DISTENTION: 0
DIARRHEA: 0
EYES NEGATIVE: 1
RESPIRATORY NEGATIVE: 1
ANAL BLEEDING: 0
RECTAL PAIN: 0

## 2022-10-18 ASSESSMENT — PATIENT HEALTH QUESTIONNAIRE - PHQ9
10. IF YOU CHECKED OFF ANY PROBLEMS, HOW DIFFICULT HAVE THESE PROBLEMS MADE IT FOR YOU TO DO YOUR WORK, TAKE CARE OF THINGS AT HOME, OR GET ALONG WITH OTHER PEOPLE: 0
SUM OF ALL RESPONSES TO PHQ QUESTIONS 1-9: 0
6. FEELING BAD ABOUT YOURSELF - OR THAT YOU ARE A FAILURE OR HAVE LET YOURSELF OR YOUR FAMILY DOWN: 0
8. MOVING OR SPEAKING SO SLOWLY THAT OTHER PEOPLE COULD HAVE NOTICED. OR THE OPPOSITE, BEING SO FIGETY OR RESTLESS THAT YOU HAVE BEEN MOVING AROUND A LOT MORE THAN USUAL: 0
1. LITTLE INTEREST OR PLEASURE IN DOING THINGS: 0
SUM OF ALL RESPONSES TO PHQ QUESTIONS 1-9: 0
SUM OF ALL RESPONSES TO PHQ9 QUESTIONS 1 & 2: 0
SUM OF ALL RESPONSES TO PHQ QUESTIONS 1-9: 0
SUM OF ALL RESPONSES TO PHQ QUESTIONS 1-9: 0
5. POOR APPETITE OR OVEREATING: 0
3. TROUBLE FALLING OR STAYING ASLEEP: 0
4. FEELING TIRED OR HAVING LITTLE ENERGY: 0
2. FEELING DOWN, DEPRESSED OR HOPELESS: 0
7. TROUBLE CONCENTRATING ON THINGS, SUCH AS READING THE NEWSPAPER OR WATCHING TELEVISION: 0
9. THOUGHTS THAT YOU WOULD BE BETTER OFF DEAD, OR OF HURTING YOURSELF: 0

## 2022-10-18 ASSESSMENT — VISUAL ACUITY: OU: 1

## 2022-10-18 NOTE — PROGRESS NOTES
Subjective:      Patient ID: Elisabeth Crabtree is a 69 y.o. female    Annual exam.  No PMB. No GYN complaints. Pap deferred. Screening mammogram ordered. Monthly SBE encouraged. Dexa scan ordered per protocol. Screening colonoscopy recommended per routine. Discussed new onset full body rash.  + diffuse itching. Erythematous patches entire body. Appears allergic in nature. Patient denies new soaps, detergents, foods, meds. No recent travel. No new animal exposures. Offered Medrol dose pack and has appt with PCP. F/U annual exam or prn. Vitals:  /82   Ht 5' 4.25\" (1.632 m)   Wt 146 lb (66.2 kg)   BMI 24.87 kg/m²   Past Medical History:   Diagnosis Date    Atrial fibrillation ([facility] [address]     Claustrophobia     Hair loss     Menopause     Multiple lipomas     LIPOMAS/CYST ON HAND     Past Surgical History:   Procedure Laterality Date    ABDOMINOPLASTY      2004    CATHETER REMOVAL      FIBULA FRACTURE SURGERY      [phone]    FOOT SURGERY      various    FRACTURE SURGERY      right thumb    HIP SURGERY      replacement 2006 right    KNEE SURGERY  09/15/2020    left knee    KNEE SURGERY Right     TUBAL LIGATION      1986    WRIST SURGERY      2 screws     Allergies:  Demerol hcl [meperidine]  Current Outpatient Medications   Medication Sig Dispense Refill    propafenone (RYTHMOL) 150 MG tablet Take by mouth      ALPRAZolam (XANAX) 0.5 MG tablet TAKE 1 TABLET THREE TIMES DAILY AS NEEDED      methylPREDNISolone (MEDROL DOSEPACK) 4 MG tablet Take by mouth.  1 kit 0    lisinopril (PRINIVIL;ZESTRIL) 10 MG tablet TAKE 1 TABLET BY MOUTH DAILY  3    ELIQUIS 5 MG TABS tablet TAKE 1 TABLET BY MOUTH TWICE DAILY  11    Cholecalciferol (VITAMIN D3) 125 MCG (5000 UT) TABS Take by mouth (Patient not taking: Reported on 10/18/2022)      Red Yeast Rice Extract (RED YEAST RICE PO) Take by mouth (Patient not taking: Reported on 10/18/2022)      denosumab (PROLIA) 60 MG/ML SOSY SC injection Inject 1 mL into the skin once for 1 dose 180 mL 1    rosuvastatin (CRESTOR) 10 MG tablet TAKE 1 TABLET BY MOUTH TWICE A WEEK (Patient not taking: Reported on 10/18/2022)      KRILL OIL PO Take by mouth (Patient not taking: Reported on 10/18/2022)      TURMERIC PO Take by mouth (Patient not taking: Reported on 10/18/2022)      Ferrous Sulfate (IRON) 325 (65 FE) MG TABS Take  by mouth. (Patient not taking: Reported on 10/18/2022)       No current facility-administered medications for this visit. Social History     Socioeconomic History    Marital status:      Spouse name: Not on file    Number of children: Not on file    Years of education: Not on file    Highest education level: Not on file   Occupational History    Occupation: dentist   Tobacco Use    Smoking status: Never    Smokeless tobacco: Never   Substance and Sexual Activity    Alcohol use: Not Currently    Drug use: Yes     Types: Opiates      Comment: oxycodone for knee    Sexual activity: Not Currently   Other Topics Concern    Not on file   Social History Narrative    Not on file     Social Determinants of Health     Financial Resource Strain: Not on file   Food Insecurity: Not on file   Transportation Needs: Not on file   Physical Activity: Not on file   Stress: Not on file   Social Connections: Not on file   Intimate Partner Violence: Not on file   Housing Stability: Not on file     Family History   Problem Relation Age of Onset    Heart Surgery Father         cabg       Review of Systems   Constitutional: Negative. Negative for activity change, appetite change, chills, diaphoresis, fatigue, fever and unexpected weight change. HENT: Negative. Eyes: Negative. Respiratory: Negative. Cardiovascular: Negative. Gastrointestinal:  Negative for abdominal distention, abdominal pain, anal bleeding, blood in stool, constipation, diarrhea, nausea, rectal pain and vomiting. Endocrine: Negative.     Genitourinary:  Negative for decreased urine volume, difficulty urinating, dyspareunia, dysuria, enuresis, flank pain, frequency, genital sores, hematuria, menstrual problem, pelvic pain, urgency, vaginal bleeding, vaginal discharge and vaginal pain. Musculoskeletal: Negative. Skin:  Positive for rash. Allergic/Immunologic: Negative. Neurological: Negative. Hematological: Negative. Psychiatric/Behavioral: Negative. Objective:     Physical Exam  Constitutional:       Appearance: She is well-developed. HENT:      Head: Normocephalic. Eyes:      General: Lids are normal. Vision grossly intact. Neck:      Thyroid: No thyromegaly. Cardiovascular:      Rate and Rhythm: Normal rate and regular rhythm. Heart sounds: Normal heart sounds. Pulmonary:      Effort: Pulmonary effort is normal. No respiratory distress. Breath sounds: Normal breath sounds. No wheezing or rales. Chest:      Chest wall: No tenderness. Breasts:     Right: Normal. No swelling, bleeding, inverted nipple, mass, nipple discharge, skin change or tenderness. Left: Normal. No swelling, bleeding, inverted nipple, mass, nipple discharge, skin change or tenderness. Abdominal:      General: There is no distension. Palpations: Abdomen is soft. There is no mass. Tenderness: There is no abdominal tenderness. There is no guarding or rebound. Hernia: No hernia is present. There is no hernia in the left inguinal area or right inguinal area. Genitourinary:     General: Normal vulva. Pubic Area: No rash. Labia:         Right: No rash, tenderness, lesion or injury. Left: No rash, tenderness, lesion or injury. Urethra: No prolapse, urethral swelling or urethral lesion. Vagina: Normal. No signs of injury and foreign body. No vaginal discharge, erythema, tenderness or bleeding. Cervix: No cervical motion tenderness, discharge or friability. Uterus: Not deviated, not enlarged, not fixed and not tender.        Adnexa: Right: No mass, tenderness or fullness. Left: No mass, tenderness or fullness. Rectum: Normal.   Musculoskeletal:         General: No tenderness. Normal range of motion. Cervical back: Normal range of motion and neck supple. Lymphadenopathy:      Cervical: No cervical adenopathy. Upper Body:      Right upper body: No supraclavicular or axillary adenopathy. Left upper body: No supraclavicular or axillary adenopathy. Lower Body: No right inguinal adenopathy. No left inguinal adenopathy. Skin:     General: Skin is warm and dry. Coloration: Skin is not pale. Findings: Rash present. No erythema. Rash is urticarial.   Neurological:      Mental Status: She is alert and oriented to person, place, and time. Psychiatric:         Behavior: Behavior normal.         Thought Content: Thought content normal.         Judgment: Judgment normal.       Assessment:      Diagnosis Orders   1. Encounter for well woman exam with routine gynecological exam        2. Screening mammogram for breast cancer  FRANNY DIGITAL SCREEN W OR WO CAD BILATERAL      3. Rash              Plan:      Medications placedthis encounter:  Orders Placed This Encounter   Medications    methylPREDNISolone (MEDROL DOSEPACK) 4 MG tablet     Sig: Take by mouth. Dispense:  1 kit     Refill:  0           Orders placedthis encounter:  Orders Placed This Encounter   Procedures    FRANNY DIGITAL SCREEN W OR WO CAD BILATERAL     Standing Status:   Future     Standing Expiration Date:   10/18/2023         Follow up:  Return in about 1 year (around 10/18/2023) for Annual.  Repeat Annual GYN exam every 1 year. Cervical Cytology exam starts age 24. If Negative Cytology;  follow-up screening per current guidelines. Mammograms yearly starting at age 36. Calcium and Vitamin D dosing reviewed ( age appropriate ). Colonoscopy and bone density screening discussed ( age appropriate ).     Birth control and STD prevention discussed ( age appropriate ). Gardisil counseling completed for all patients ( age appropriate ). Routine health maintenance ( per PCP and guidelines ).

## 2022-10-18 NOTE — PROGRESS NOTES
The patient was asked if she would like a chaperone present for her intimate exam. She  Declined the chaperone.  Ale Wise CMA ([address] [place] [address])

## 2023-05-10 DIAGNOSIS — F41.9 ANXIETY: ICD-10-CM

## 2023-05-10 RX ORDER — ALPRAZOLAM 0.5 MG/1
0.5 TABLET ORAL 3 TIMES DAILY PRN
COMMUNITY
End: 2023-07-12 | Stop reason: SDUPTHER

## 2023-05-10 RX ORDER — ALPRAZOLAM 0.5 MG/1
0.5 TABLET ORAL 3 TIMES DAILY PRN
Qty: 21 TABLET | Refills: 0 | OUTPATIENT
Start: 2023-05-10

## 2023-05-10 NOTE — TELEPHONE ENCOUNTER
Left detailed vmx for patient   Advised to call back if she would like to schedule an appt for csv

## 2023-07-11 ENCOUNTER — APPOINTMENT (OUTPATIENT)
Dept: PRIMARY CARE | Facility: CLINIC | Age: 68
End: 2023-07-11
Payer: MEDICARE

## 2023-07-12 ENCOUNTER — OFFICE VISIT (OUTPATIENT)
Dept: PRIMARY CARE | Facility: CLINIC | Age: 68
End: 2023-07-12
Payer: MEDICARE

## 2023-07-12 VITALS
BODY MASS INDEX: 23.3 KG/M2 | DIASTOLIC BLOOD PRESSURE: 77 MMHG | TEMPERATURE: 97.2 F | WEIGHT: 140 LBS | SYSTOLIC BLOOD PRESSURE: 118 MMHG | HEART RATE: 68 BPM | OXYGEN SATURATION: 98 %

## 2023-07-12 DIAGNOSIS — Z79.899 MEDICATION MANAGEMENT: ICD-10-CM

## 2023-07-12 DIAGNOSIS — I48.91 ATRIAL FIBRILLATION, CONTROLLED (MULTI): ICD-10-CM

## 2023-07-12 DIAGNOSIS — F41.9 ANXIETY: Primary | ICD-10-CM

## 2023-07-12 PROBLEM — E78.2 HYPERLIPIDEMIA, MIXED: Status: ACTIVE | Noted: 2023-07-12

## 2023-07-12 PROBLEM — Z86.010 HISTORY OF COLON POLYPS: Status: ACTIVE | Noted: 2023-07-12

## 2023-07-12 PROBLEM — M47.816 LUMBAR SPONDYLOSIS: Status: ACTIVE | Noted: 2023-07-12

## 2023-07-12 PROBLEM — E03.9 HYPOTHYROIDISM, ADULT: Status: ACTIVE | Noted: 2023-07-12

## 2023-07-12 PROBLEM — I73.00 PRIMARY RAYNAUD'S PHENOMENON: Status: ACTIVE | Noted: 2023-07-12

## 2023-07-12 PROBLEM — E87.1 HYPONATREMIA: Status: ACTIVE | Noted: 2023-07-12

## 2023-07-12 PROBLEM — Z86.0100 HISTORY OF COLON POLYPS: Status: ACTIVE | Noted: 2023-07-12

## 2023-07-12 PROBLEM — M85.9 DISORDER OF BONE DENSITY AND STRUCTURE, UNSPECIFIED: Status: ACTIVE | Noted: 2023-07-12

## 2023-07-12 PROBLEM — Z87.19 H/O GASTRITIS: Status: ACTIVE | Noted: 2023-07-12

## 2023-07-12 PROBLEM — H81.10 BPPV (BENIGN PAROXYSMAL POSITIONAL VERTIGO): Status: ACTIVE | Noted: 2023-07-12

## 2023-07-12 PROBLEM — I10 HYPERTENSION, ESSENTIAL: Status: ACTIVE | Noted: 2023-07-12

## 2023-07-12 PROBLEM — Z87.442 HISTORY OF KIDNEY STONES: Status: ACTIVE | Noted: 2023-07-12

## 2023-07-12 PROBLEM — Z71.89 CARDIAC RISK COUNSELING: Status: ACTIVE | Noted: 2023-07-12

## 2023-07-12 PROBLEM — Z71.89 ADVANCE DIRECTIVE DISCUSSED WITH PATIENT: Status: ACTIVE | Noted: 2023-07-12

## 2023-07-12 PROBLEM — M41.9 SCOLIOSIS: Status: ACTIVE | Noted: 2023-07-12

## 2023-07-12 PROBLEM — K57.30 DIVERTICULOSIS OF COLON: Status: ACTIVE | Noted: 2023-07-12

## 2023-07-12 PROBLEM — I49.1 PAC (PREMATURE ATRIAL CONTRACTION): Status: ACTIVE | Noted: 2023-07-12

## 2023-07-12 PROBLEM — F51.04 CHRONIC INSOMNIA: Status: ACTIVE | Noted: 2023-07-12

## 2023-07-12 PROBLEM — Z00.00 ROUTINE HEALTH MAINTENANCE: Status: ACTIVE | Noted: 2023-07-12

## 2023-07-12 PROBLEM — E55.9 VITAMIN D INSUFFICIENCY: Status: ACTIVE | Noted: 2023-07-12

## 2023-07-12 PROCEDURE — 1159F MED LIST DOCD IN RCRD: CPT | Performed by: NURSE PRACTITIONER

## 2023-07-12 PROCEDURE — 1036F TOBACCO NON-USER: CPT | Performed by: NURSE PRACTITIONER

## 2023-07-12 PROCEDURE — 80346 BENZODIAZEPINES1-12: CPT

## 2023-07-12 PROCEDURE — 3078F DIAST BP <80 MM HG: CPT | Performed by: NURSE PRACTITIONER

## 2023-07-12 PROCEDURE — 99213 OFFICE O/P EST LOW 20 MIN: CPT | Performed by: NURSE PRACTITIONER

## 2023-07-12 PROCEDURE — 1160F RVW MEDS BY RX/DR IN RCRD: CPT | Performed by: NURSE PRACTITIONER

## 2023-07-12 PROCEDURE — 3074F SYST BP LT 130 MM HG: CPT | Performed by: NURSE PRACTITIONER

## 2023-07-12 PROCEDURE — 80307 DRUG TEST PRSMV CHEM ANLYZR: CPT

## 2023-07-12 RX ORDER — LEVOTHYROXINE SODIUM 75 UG/1
75 TABLET ORAL
COMMUNITY
End: 2024-02-15 | Stop reason: SDUPTHER

## 2023-07-12 RX ORDER — AMPICILLIN TRIHYDRATE 250 MG
1 CAPSULE ORAL DAILY
COMMUNITY

## 2023-07-12 RX ORDER — LISINOPRIL 5 MG/1
TABLET ORAL
COMMUNITY
Start: 2020-07-10 | End: 2023-10-20 | Stop reason: ALTCHOICE

## 2023-07-12 RX ORDER — ALPRAZOLAM 0.5 MG/1
0.5 TABLET ORAL 3 TIMES DAILY PRN
Qty: 21 TABLET | Refills: 0 | Status: SHIPPED | OUTPATIENT
Start: 2023-07-12 | End: 2023-10-20 | Stop reason: SDUPTHER

## 2023-07-12 RX ORDER — FERROUS SULFATE 325(65) MG
325 TABLET ORAL 2 TIMES WEEKLY
COMMUNITY

## 2023-07-12 RX ORDER — DENOSUMAB 60 MG/ML
60 INJECTION SUBCUTANEOUS ONCE
COMMUNITY
End: 2023-10-20 | Stop reason: SDUPTHER

## 2023-07-12 RX ORDER — SERTRALINE HYDROCHLORIDE 25 MG/1
25 TABLET, FILM COATED ORAL DAILY
Qty: 30 TABLET | Refills: 11 | Status: SHIPPED | OUTPATIENT
Start: 2023-07-12 | End: 2023-08-15 | Stop reason: WASHOUT

## 2023-07-12 RX ORDER — MULTIVITAMIN
1 TABLET ORAL DAILY
COMMUNITY

## 2023-07-12 RX ORDER — ACETAMINOPHEN 500 MG
1 TABLET ORAL DAILY
COMMUNITY

## 2023-07-12 RX ORDER — DILTIAZEM HYDROCHLORIDE 30 MG/1
TABLET, FILM COATED ORAL
COMMUNITY
Start: 2023-05-08 | End: 2023-10-20 | Stop reason: ALTCHOICE

## 2023-07-12 RX ORDER — FLECAINIDE ACETATE 50 MG/1
50 TABLET ORAL 2 TIMES DAILY
COMMUNITY
Start: 2023-04-03 | End: 2024-02-15 | Stop reason: DRUGHIGH

## 2023-07-12 RX ORDER — BIOTIN 10 MG
1 TABLET ORAL DAILY
COMMUNITY
End: 2023-10-20 | Stop reason: ALTCHOICE

## 2023-07-12 RX ORDER — APIXABAN 5 MG/1
5 TABLET, FILM COATED ORAL 2 TIMES DAILY
COMMUNITY
End: 2024-05-28 | Stop reason: SDUPTHER

## 2023-07-12 ASSESSMENT — ANXIETY QUESTIONNAIRES
IF YOU CHECKED OFF ANY PROBLEMS ON THIS QUESTIONNAIRE, HOW DIFFICULT HAVE THESE PROBLEMS MADE IT FOR YOU TO DO YOUR WORK, TAKE CARE OF THINGS AT HOME, OR GET ALONG WITH OTHER PEOPLE: NOT DIFFICULT AT ALL
4. TROUBLE RELAXING: NEARLY EVERY DAY
GAD7 TOTAL SCORE: 17
2. NOT BEING ABLE TO STOP OR CONTROL WORRYING: MORE THAN HALF THE DAYS
6. BECOMING EASILY ANNOYED OR IRRITABLE: NEARLY EVERY DAY
5. BEING SO RESTLESS THAT IT IS HARD TO SIT STILL: NEARLY EVERY DAY
3. WORRYING TOO MUCH ABOUT DIFFERENT THINGS: NEARLY EVERY DAY
1. FEELING NERVOUS, ANXIOUS, OR ON EDGE: NEARLY EVERY DAY
7. FEELING AFRAID AS IF SOMETHING AWFUL MIGHT HAPPEN: NOT AT ALL

## 2023-07-12 ASSESSMENT — PATIENT HEALTH QUESTIONNAIRE - PHQ9
2. FEELING DOWN, DEPRESSED OR HOPELESS: NOT AT ALL
1. LITTLE INTEREST OR PLEASURE IN DOING THINGS: NOT AT ALL
SUM OF ALL RESPONSES TO PHQ9 QUESTIONS 1 AND 2: 0

## 2023-07-12 NOTE — ASSESSMENT & PLAN NOTE
Managed with PRN xanax  Discussed SSRI  Interested in starting zoloft  Discussed risks and benefits

## 2023-07-12 NOTE — PROGRESS NOTES
Subjective   Patient ID: Elsie Ch is a 67 y.o. female who presents for Med Management and Follow-up.    Xanax  For anxiety and traveling  Uses sparingly  Does have a lot of anxiety  Internal  Able to cope  Has never used SSRI        Review of Systems  ROS completely negative except what was mentioned in the HPI.  Problem List, surgical, social, and family histories which were reviewed and updated as necessary within the EMR. I also personally reviewed the notes, labs, and imaging that pertained to what was documented or discussed in the HPI.    Objective   Physical Exam  Vitals and nursing note reviewed.   Constitutional:       General: She is not in acute distress.     Appearance: Normal appearance.   HENT:      Head: Normocephalic and atraumatic.      Right Ear: External ear normal.      Left Ear: External ear normal.      Nose: Nose normal.      Mouth/Throat:      Mouth: Mucous membranes are moist.   Eyes:      Extraocular Movements: Extraocular movements intact.      Conjunctiva/sclera: Conjunctivae normal.      Pupils: Pupils are equal, round, and reactive to light.   Cardiovascular:      Rate and Rhythm: Normal rate and regular rhythm.      Heart sounds: Normal heart sounds.   Pulmonary:      Effort: Pulmonary effort is normal.      Breath sounds: Normal breath sounds.   Musculoskeletal:         General: Normal range of motion.      Cervical back: Normal range of motion and neck supple.   Skin:     General: Skin is warm and dry.   Neurological:      General: No focal deficit present.      Mental Status: She is alert and oriented to person, place, and time. Mental status is at baseline.   Psychiatric:         Mood and Affect: Mood normal.         Behavior: Behavior normal.         Thought Content: Thought content normal.         Judgment: Judgment normal.         /77   Pulse 68   Temp 36.2 °C (97.2 °F) (Temporal)   Wt 63.5 kg (140 lb)   SpO2 98%   BMI 23.30 kg/m²     Assessment/Plan    Problem  List Items Addressed This Visit       Anxiety - Primary    Current Assessment & Plan     Managed with PRN xanax  Discussed SSRI  Interested in starting zoloft  Discussed risks and benefits         Relevant Medications    sertraline (Zoloft) 25 mg tablet    ALPRAZolam (Xanax) 0.5 mg tablet    Other Relevant Orders    Drug Screen, Urine With Reflex to Confirmation    Atrial fibrillation, controlled (CMS/[facility])    Current Assessment & Plan     Managed with eliquis, diltiazem, flecainide  Stable  Monitor         Relevant Medications    dilTIAZem (Cardizem) 30 mg immediate release tablet     Other Visit Diagnoses       Medication management        Relevant Orders    Drug Screen, Urine With Reflex to Confirmation           OARRS:  Samina Ramos, APRN-CNP on 2023  7:53 AM  I have personally reviewed the OARRS report for Delaney Ch. I have considered the risks of abuse, dependence, addiction and diversion and I believe that it is clinically appropriate for Delaney Ch to be prescribed this medication    Is the patient prescribed a combination of a benzodiazepine and opioid?  Yes, I feel it is clincially indicated to continue the medication and have discussed with the patient risks/benefits/alternatives.    Last Urine Drug Screen / ordered today: Yes  No results found for this or any previous visit (from the past 34359 hour(s)).  To be determined    Controlled Substance Agreement:  Date of the Last Agreement: 23  Reviewed Controlled Substance Agreement including but not limited to the benefits, risks, and alternatives to treatment with a Controlled Substance medication(s).    Benzodiazepines:  What is the patient's goal of therapy? anxiety  Is this being achieved with current treatment? yes    RONNY-7:  Over the last 2 weeks, how often have you been bothered by any of the following problems?  Feeling nervous, anxious, or on edge: 3  Not being able to stop or control worryin  Worrying too much about  different things: 3  Trouble relaxing: 3  Being so restless that it is hard to sit still: 3  Becoming easily annoyed or irritable: 3  Feeling afraid as if something awful might happen: 0  RONNY-7 Total Score: 17        Activities of Daily Living:   Is your overall impression that this patient is benefiting (symptom reduction outweighs side effects) from benzodiazepine therapy? Yes     1. Physical Functioning: Better  2. Family Relationship: Same  3. Social Relationship: Same  4. Mood: Better  5. Sleep Patterns: Better  6. Overall Function: BetterPatient was identified as a fall risk. Risk prevention instructions provided.

## 2023-07-13 LAB
AMPHETAMINE (PRESENCE) IN URINE BY SCREEN METHOD: ABNORMAL
BARBITURATES PRESENCE IN URINE BY SCREEN METHOD: ABNORMAL
BENZODIAZEPINE (PRESENCE) IN URINE BY SCREEN METHOD: ABNORMAL
CANNABINOIDS IN URINE BY SCREEN METHOD: ABNORMAL
COCAINE (PRESENCE) IN URINE BY SCREEN METHOD: ABNORMAL
DRUG SCREEN COMMENT URINE: ABNORMAL
FENTANYL URINE: ABNORMAL
METHADONE (PRESENCE) IN URINE BY SCREEN METHOD: ABNORMAL
OPIATES (PRESENCE) IN URINE BY SCREEN METHOD: ABNORMAL
OXYCODONE (PRESENCE) IN URINE BY SCREEN METHOD: ABNORMAL
PHENCYCLIDINE (PRESENCE) IN URINE BY SCREEN METHOD: ABNORMAL

## 2023-07-14 ENCOUNTER — HOSPITAL ENCOUNTER (OUTPATIENT)
Dept: DATA CONVERSION | Facility: HOSPITAL | Age: 68
End: 2023-07-14
Attending: PODIATRIST | Admitting: PODIATRIST
Payer: MEDICARE

## 2023-07-14 DIAGNOSIS — M65.872 OTHER SYNOVITIS AND TENOSYNOVITIS, LEFT ANKLE AND FOOT: ICD-10-CM

## 2023-07-14 DIAGNOSIS — M19.072 PRIMARY OSTEOARTHRITIS, LEFT ANKLE AND FOOT: ICD-10-CM

## 2023-07-14 DIAGNOSIS — M21.172 VARUS DEFORMITY, NOT ELSEWHERE CLASSIFIED, LEFT ANKLE: ICD-10-CM

## 2023-07-14 DIAGNOSIS — M25.572 PAIN IN LEFT ANKLE AND JOINTS OF LEFT FOOT: ICD-10-CM

## 2023-07-18 LAB
7-AMINOCLONAZEPAM: <25 NG/ML
ALPHA-HYDROXYALPRAZOLAM: 83 NG/ML
ALPHA-HYDROXYMIDAZOLAM: <25 NG/ML
ALPRAZOLAM: 49 NG/ML
CHLORDIAZEPOXIDE: <25 NG/ML
CLONAZEPAM: <25 NG/ML
DIAZEPAM: <25 NG/ML
LORAZEPAM: <25 NG/ML
MIDAZOLAM: <25 NG/ML
NORDIAZEPAM: <25 NG/ML
OXAZEPAM: <25 NG/ML
TEMAZEPAM: <25 NG/ML

## 2023-08-01 LAB
COMPLETE PATHOLOGY REPORT: NORMAL
CONVERTED CLINICAL DIAGNOSIS-HISTORY: NORMAL
CONVERTED FINAL DIAGNOSIS: NORMAL
CONVERTED FINAL REPORT PDF LINK TO COPY AND PASTE: NORMAL
CONVERTED GROSS DESCRIPTION: NORMAL

## 2023-08-14 NOTE — PROGRESS NOTES
Subjective   Patient ID: Elsie Ch is a 67 y.o. female who presents for Anxiety (Has not taken the anxiety med why was she put on that/Has an issue with weight gain).    Using xanax sparingly  RONNY = 17 at last visit  We discussed zoloft  She did not start  Does not want to start  Feels like she can cope well with her anxiety  Worried about weight gain  Is getting boot off soon  And then can go walking 4miles again with friends  Needs lab orders        Review of Systems  ROS completely negative except what was mentioned in the HPI.  Problem List, surgical, social, and family histories which were reviewed and updated as necessary within the EMR. I also personally reviewed the notes, labs, and imaging that pertained to what was documented or discussed in the HPI.      Objective   Physical Exam  Vitals and nursing note reviewed.   Constitutional:       Appearance: Normal appearance.   HENT:      Head: Normocephalic and atraumatic.      Right Ear: External ear normal.      Left Ear: External ear normal.      Nose: Nose normal.      Mouth/Throat:      Mouth: Mucous membranes are moist.   Eyes:      Extraocular Movements: Extraocular movements intact.      Conjunctiva/sclera: Conjunctivae normal.   Pulmonary:      Effort: Pulmonary effort is normal.   Musculoskeletal:      Cervical back: Normal range of motion and neck supple.   Neurological:      General: No focal deficit present.      Mental Status: She is alert and oriented to person, place, and time. Mental status is at baseline.   Psychiatric:         Mood and Affect: Mood normal.         Behavior: Behavior normal.         Thought Content: Thought content normal.         Judgment: Judgment normal.         /76   Pulse 68   Temp 36.2 °C (97.1 °F)   Wt 63.5 kg (140 lb)   SpO2 98%   BMI 23.30 kg/m²     Assessment/Plan    Problem List Items Addressed This Visit       Anxiety - Primary    Overview     RONNY: 7/12/23 = 17  Uses xanax sparingly         Current  Assessment & Plan     Did not start zoloft, prefers not to  Managing well  monitor         Relevant Orders    CBC and Auto Differential    History of kidney stones    Overview      HCTZ caused it      Has not had recurrence after stopped it         Relevant Orders    Urinalysis with Reflex Microscopic    Hyperlipidemia, mixed    Overview     Diet managed      Goal LDL <100      Failed Crestor 2x/week(intolerant)      On RYR         Relevant Orders    Lipid Panel    Comprehensive Metabolic Panel    CBC and Auto Differential    Hypothyroidism, adult    Overview     TSH > 3.5, symptomatic      Goal TSH 1-2      Onsynthroid         Relevant Orders    CBC and Auto Differential    TSH    T4, free    Vitamin D insufficiency    Overview     Goal 30-40 (hx kidney stone)         Relevant Orders    Vitamin D, Total

## 2023-08-15 ENCOUNTER — OFFICE VISIT (OUTPATIENT)
Dept: PRIMARY CARE | Facility: CLINIC | Age: 68
End: 2023-08-15
Payer: MEDICARE

## 2023-08-15 VITALS
HEART RATE: 68 BPM | SYSTOLIC BLOOD PRESSURE: 124 MMHG | DIASTOLIC BLOOD PRESSURE: 76 MMHG | OXYGEN SATURATION: 98 % | WEIGHT: 140 LBS | BODY MASS INDEX: 23.3 KG/M2 | TEMPERATURE: 97.1 F

## 2023-08-15 DIAGNOSIS — Z87.442 HISTORY OF KIDNEY STONES: ICD-10-CM

## 2023-08-15 DIAGNOSIS — E55.9 VITAMIN D INSUFFICIENCY: ICD-10-CM

## 2023-08-15 DIAGNOSIS — E78.2 HYPERLIPIDEMIA, MIXED: ICD-10-CM

## 2023-08-15 DIAGNOSIS — E03.9 HYPOTHYROIDISM, ADULT: ICD-10-CM

## 2023-08-15 DIAGNOSIS — F41.9 ANXIETY: Primary | ICD-10-CM

## 2023-08-15 PROCEDURE — 1036F TOBACCO NON-USER: CPT | Performed by: NURSE PRACTITIONER

## 2023-08-15 PROCEDURE — 1159F MED LIST DOCD IN RCRD: CPT | Performed by: NURSE PRACTITIONER

## 2023-08-15 PROCEDURE — 3078F DIAST BP <80 MM HG: CPT | Performed by: NURSE PRACTITIONER

## 2023-08-15 PROCEDURE — 99212 OFFICE O/P EST SF 10 MIN: CPT | Performed by: NURSE PRACTITIONER

## 2023-08-15 PROCEDURE — 1160F RVW MEDS BY RX/DR IN RCRD: CPT | Performed by: NURSE PRACTITIONER

## 2023-08-15 PROCEDURE — 3074F SYST BP LT 130 MM HG: CPT | Performed by: NURSE PRACTITIONER

## 2023-08-15 RX ORDER — VIT A/VIT C/VIT E/ZINC/COPPER 2148-113
1 TABLET ORAL DAILY
COMMUNITY

## 2023-08-15 RX ORDER — GLUCOSAM/CHONDRO/HERB 149/HYAL 750-100 MG
1 TABLET ORAL DAILY
COMMUNITY

## 2023-09-01 DIAGNOSIS — E87.1 HYPONATREMIA: Primary | ICD-10-CM

## 2023-09-01 DIAGNOSIS — E83.52 HYPERCALCEMIA: ICD-10-CM

## 2023-09-01 LAB
ALANINE AMINOTRANSFERASE (SGPT) (U/L) IN SER/PLAS: 13 U/L (ref 7–45)
ALBUMIN (G/DL) IN SER/PLAS: 4.5 G/DL (ref 3.4–5)
ALKALINE PHOSPHATASE (U/L) IN SER/PLAS: 80 U/L (ref 33–136)
ANION GAP IN SER/PLAS: 13 MMOL/L (ref 10–20)
APPEARANCE, URINE: CLEAR
ASPARTATE AMINOTRANSFERASE (SGOT) (U/L) IN SER/PLAS: 21 U/L (ref 9–39)
BASOPHILS (10*3/UL) IN BLOOD BY AUTOMATED COUNT: 0.03 X10E9/L (ref 0–0.1)
BASOPHILS/100 LEUKOCYTES IN BLOOD BY AUTOMATED COUNT: 0.5 % (ref 0–2)
BILIRUBIN TOTAL (MG/DL) IN SER/PLAS: 1.3 MG/DL (ref 0–1.2)
BILIRUBIN, URINE: NEGATIVE
BLOOD, URINE: NEGATIVE
CALCIDIOL (25 OH VITAMIN D3) (NG/ML) IN SER/PLAS: 60 NG/ML
CALCIUM (MG/DL) IN SER/PLAS: 10.5 MG/DL (ref 8.6–10.3)
CARBON DIOXIDE, TOTAL (MMOL/L) IN SER/PLAS: 28 MMOL/L (ref 21–32)
CHLORIDE (MMOL/L) IN SER/PLAS: 94 MMOL/L (ref 98–107)
CHOLESTEROL (MG/DL) IN SER/PLAS: 256 MG/DL (ref 0–199)
CHOLESTEROL IN HDL (MG/DL) IN SER/PLAS: 120.2 MG/DL
CHOLESTEROL/HDL RATIO: 2.1
COLOR, URINE: YELLOW
CREATININE (MG/DL) IN SER/PLAS: 0.76 MG/DL (ref 0.5–1.05)
EOSINOPHILS (10*3/UL) IN BLOOD BY AUTOMATED COUNT: 0.13 X10E9/L (ref 0–0.7)
EOSINOPHILS/100 LEUKOCYTES IN BLOOD BY AUTOMATED COUNT: 2 % (ref 0–6)
ERYTHROCYTE DISTRIBUTION WIDTH (RATIO) BY AUTOMATED COUNT: 13.2 % (ref 11.5–14.5)
ERYTHROCYTE MEAN CORPUSCULAR HEMOGLOBIN CONCENTRATION (G/DL) BY AUTOMATED: 34.1 G/DL (ref 32–36)
ERYTHROCYTE MEAN CORPUSCULAR VOLUME (FL) BY AUTOMATED COUNT: 93 FL (ref 80–100)
ERYTHROCYTES (10*6/UL) IN BLOOD BY AUTOMATED COUNT: 4.35 X10E12/L (ref 4–5.2)
GFR FEMALE: 84 ML/MIN/1.73M2
GLUCOSE (MG/DL) IN SER/PLAS: 87 MG/DL (ref 74–99)
GLUCOSE, URINE: NEGATIVE MG/DL
HEMATOCRIT (%) IN BLOOD BY AUTOMATED COUNT: 40.5 % (ref 36–46)
HEMOGLOBIN (G/DL) IN BLOOD: 13.8 G/DL (ref 12–16)
IMMATURE GRANULOCYTES/100 LEUKOCYTES IN BLOOD BY AUTOMATED COUNT: 0.3 % (ref 0–0.9)
KETONES, URINE: NEGATIVE MG/DL
LDL: 126 MG/DL (ref 0–99)
LEUKOCYTE ESTERASE, URINE: ABNORMAL
LEUKOCYTES (10*3/UL) IN BLOOD BY AUTOMATED COUNT: 6.5 X10E9/L (ref 4.4–11.3)
LYMPHOCYTES (10*3/UL) IN BLOOD BY AUTOMATED COUNT: 1.4 X10E9/L (ref 1.2–4.8)
LYMPHOCYTES/100 LEUKOCYTES IN BLOOD BY AUTOMATED COUNT: 21.4 % (ref 13–44)
MONOCYTES (10*3/UL) IN BLOOD BY AUTOMATED COUNT: 0.76 X10E9/L (ref 0.1–1)
MONOCYTES/100 LEUKOCYTES IN BLOOD BY AUTOMATED COUNT: 11.6 % (ref 2–10)
MUCUS, URINE: ABNORMAL /LPF
NEUTROPHILS (10*3/UL) IN BLOOD BY AUTOMATED COUNT: 4.19 X10E9/L (ref 1.2–7.7)
NEUTROPHILS/100 LEUKOCYTES IN BLOOD BY AUTOMATED COUNT: 64.2 % (ref 40–80)
NITRITE, URINE: NEGATIVE
PH, URINE: 6 (ref 5–8)
PLATELETS (10*3/UL) IN BLOOD AUTOMATED COUNT: 339 X10E9/L (ref 150–450)
POTASSIUM (MMOL/L) IN SER/PLAS: 4.8 MMOL/L (ref 3.5–5.3)
PROTEIN TOTAL: 6.9 G/DL (ref 6.4–8.2)
PROTEIN, URINE: NEGATIVE MG/DL
RBC, URINE: 2 /HPF (ref 0–5)
SODIUM (MMOL/L) IN SER/PLAS: 130 MMOL/L (ref 136–145)
SPECIFIC GRAVITY, URINE: 1.02 (ref 1–1.03)
SQUAMOUS EPITHELIAL CELLS, URINE: 4 /HPF
THYROTROPIN (MIU/L) IN SER/PLAS BY DETECTION LIMIT <= 0.05 MIU/L: 1.93 MIU/L (ref 0.44–3.98)
TRIGLYCERIDE (MG/DL) IN SER/PLAS: 50 MG/DL (ref 0–149)
UREA NITROGEN (MG/DL) IN SER/PLAS: 26 MG/DL (ref 6–23)
UROBILINOGEN, URINE: <2 MG/DL (ref 0–1.9)
VLDL: 10 MG/DL (ref 0–40)
WBC, URINE: 31 /HPF (ref 0–5)

## 2023-09-20 ENCOUNTER — LAB (OUTPATIENT)
Dept: LAB | Facility: LAB | Age: 68
End: 2023-09-20
Payer: MEDICARE

## 2023-09-20 DIAGNOSIS — E83.52 HYPERCALCEMIA: ICD-10-CM

## 2023-09-20 DIAGNOSIS — E87.1 HYPONATREMIA: ICD-10-CM

## 2023-09-20 LAB
ANION GAP IN SER/PLAS: 12 MMOL/L (ref 10–20)
CALCIUM (MG/DL) IN SER/PLAS: 10 MG/DL (ref 8.6–10.3)
CARBON DIOXIDE, TOTAL (MMOL/L) IN SER/PLAS: 30 MMOL/L (ref 21–32)
CHLORIDE (MMOL/L) IN SER/PLAS: 96 MMOL/L (ref 98–107)
CORTISOL (UG/DL) IN SERUM: 18.9 UG/DL (ref 2.5–20)
CREATININE (MG/DL) IN SER/PLAS: 0.63 MG/DL (ref 0.5–1.05)
GFR FEMALE: >90 ML/MIN/1.73M2
GLUCOSE (MG/DL) IN SER/PLAS: 75 MG/DL (ref 74–99)
OSMOLALITY, RANDOM URINE: 601 MOSM/KG (ref 200–1200)
OSMOLALITY, SERUM: 273 MOSM/KG H2O (ref 280–300)
PARATHYRIN INTACT (PG/ML) IN SER/PLAS: 18.5 PG/ML (ref 18.5–88)
POTASSIUM (MMOL/L) IN SER/PLAS: 4.6 MMOL/L (ref 3.5–5.3)
SODIUM (MMOL/L) IN SER/PLAS: 133 MMOL/L (ref 136–145)
UREA NITROGEN (MG/DL) IN SER/PLAS: 13 MG/DL (ref 6–23)

## 2023-09-20 PROCEDURE — 82533 TOTAL CORTISOL: CPT

## 2023-09-20 PROCEDURE — 83930 ASSAY OF BLOOD OSMOLALITY: CPT

## 2023-09-20 PROCEDURE — 80048 BASIC METABOLIC PNL TOTAL CA: CPT

## 2023-09-20 PROCEDURE — 83935 ASSAY OF URINE OSMOLALITY: CPT

## 2023-09-20 PROCEDURE — 83970 ASSAY OF PARATHORMONE: CPT

## 2023-09-20 PROCEDURE — 36415 COLL VENOUS BLD VENIPUNCTURE: CPT

## 2023-09-30 NOTE — H&P
History & Physical Reviewed:   I have reviewed the History and Physical dated:  11-Jul-2023   History and Physical reviewed and relevant findings noted. Patient examined to review pertinent physical  findings.: No significant changes   Home Medications Reviewed: no changes noted   Allergies Reviewed: no changes noted       ERAS (Enhanced Recovery After Surgery):  ·  ERAS Patient: no     Consent:   COVID-19 Consent:  ·  COVID-19 Risk Consent Surgeon has reviewed key risks related to the risk of cathie COVID-19 and if they contract COVID-19 what the risks are.     Attestation:   Note Completion:  I am a:  Resident/Fellow   Attending Attestation I saw and evaluated the patient.  I personally obtained the key and critical portions of the history and physical exam or was physically present for key and  critical portions performed by the resident/fellow. I reviewed the resident/fellow?s documentation and discussed the patient with the resident/fellow.  I agree with the resident/fellow?s medical decision making as documented in the note.     I personally evaluated the patient on 14-Jul-2023         Electronic Signatures:  Jesus Joe (WALT)  (Signed 28-Jul-2023 09:28)   Authored: Note Completion   Co-Signer: History & Physical Reviewed, ERAS, Consent, Note Completion  Jaime Cordova (FAIZAN (Resident))  (Signed 14-Jul-2023 10:35)   Authored: History & Physical Reviewed, ERAS, Consent,  Note Completion      Last Updated: 28-Jul-2023 09:28 by Jesus Joe (FAIZAN)

## 2023-10-02 NOTE — OP NOTE
PROCEDURE DETAILS    Preoperative Diagnosis:  Primary osteoarthritis of left ankle or foot, M19.072  Varus deformity, not elsewhere classified, left ankle, M21.172  Pain in left ankle, M25.572    Postoperative Diagnosis:  Primary osteoarthritis of left ankle or foot, M19.072  Varus deformity, not elsewhere classified, left ankle, M21.172  Pain in left ankle, M25.572    Surgeon: Jesus Joe  Resident/Fellow/Other Assistant: Ba Newell Richard    Procedure:  1. LEFT ANKLE ARTHROTOMY WITH SYNEVECTOMY/ LET PARTIAL TIBIA EXCISION/ LEFT SUBTALAR JOINT INJECTION WITH C-ARM    Anesthesia: Cordell Cheney  Estimated Blood Loss: 10cc  Findings: Intraoperative findings consistent with clinical and radiographic findings.   Specimens(s) Collected: yes,  Left ankle synovitis for pathology, left tibial exostosis for pathology   Complications: None  Tourniquet Times: 51 minutes thigh tourniquet at 300mmHg  Additional Details:   S/P Left open ankle arthrotomy with tibial exostectomy and subtalar joint injection    PLAN:   Weightbearing Status: Toe-touch weight-bearing to Left foot and ankle in CAM boot with crutches  Precautions: Fall Risk  Pain: Percocet 5/325  Perioperative ABx: 2g Ancef  DVT PPx: mechanical  Dressing: Bacitracin, Adaptic, 4x4, kerlix, modified Wu compression dressing. To remain clean, dry, and intact until follow-up.  Drain: None.  Diet: Return to regular diet.    Ba Newell DPM PGY-2  Podiatric Medicine & Surgery  [facility]   Patient Returned To/Condition: Patient returned to post operative recovery area with all vital signs stable and intact. Normal capillary perfusion noted to most distal aspect of surgical  extremity.         Operative Report:   NAME: Delaney Ch MRN: [id number]  DATE: 07/14/2023 AGE: 67 years    SURGEON: Jesus Joe DPBRYANNA  ASSISTANT(s): Ba Newell DPM PGY-2, Jaime GODOYM PGY-3  OPERATIONS:   1) Left open ankle arthrotomy with  synovectomy   2) Left distal tibial exostectomy  3) Left subtalar joint therapeutic corticosteroid injection    ANESTHESIA: General     PREOPERATIVE DIAGNOSES:   1) Primary osteoarthritis, left ankle M19.072  2) Varus deformity, left ankle M21.172  3) Pain in left ankle M25.572    POSTOPERATIVE DIAGNOSES: same as preoperative diagnoses    OPERATIVE INDICATIONS: This is a very pleasant 67 year old female with a history of painful left ankle osteoarthritis not relieved with conservative care. Preoperatively, all risks, benefits, and alternatives relative to the procedure were discussed at  length with the patient. All questions were answered. No guarantees were given. Patient requested to proceed.     OPERATIVE FINDINGS: Abundance of synovitis within the anterior ankle joint capsule. Large distal tibial exostoses over medial and lateral ankle joint gutters.      OPERATIVE PROCEDURE: Patient was transferred from the preoperative holding area to the operative suite and placed on the table in a supine position. All monitors were applied. General anesthesia was induced. Prophylaxis was obtained with Ancef 2g IV piggyback  pre-operatively.  Tourniquet was applied to the left thigh, but not yet inflated. The left leg was then prepped and draped in the normal sterile fashion. After elevation of the left foot and ankle and exsanguination via Esmarch bandage, the tourniquet  was inflated to 300 mmHg.    The ankle joint was put through range of motion and was noted to have notably decreased dorsiflexion range of motion with a hard stop. Attention was directed to the anteromedial aspect of the left ankle where a 5cm linear incision was planned. Intraoperative  fluoroscopy was utilized to confirm the location of the patient?s intra articular pathology. A 5cm linear incision was made as planned with a #15 blade just medial to the tibialis anterior tendon at the level of the ankle joint. Careful dissection  was carried deep to the  level of the subcutaneous tissue. Care was taken to preserve any neurovascular structures. Superficial venous bleeders were meticulously retracted and electrocauterized as necessary. Dissection proceeded down to the ankle joint  capsule. A linear capsulotomy was planned and made with a clean #15 blade. The capsule was noted to be significantly thickened with an abundance of synovitis within the anterior joint capsule. Synovitis was sharply excised with care to maintain the integrity  of the capsule. This synovitis was passed from the operative field to be prepared as a pathology specimen. The joint capsule was flushed with normal sterile saline. Large exostoses were noted along the distal anterior margin of the tibia, most notably  at over the medial and lateral gutters. Capsule and synovitis were freed from the distal anterior tibia and exostoses with a #15 blade. Next, a straight osteotome with mallet and rongeur were utilized to excise the exostoses in their entirety. These exostoses  were then passed from the operative field to be prepared as a second pathology specimen. A reciprocating rasp on power was then utilized to smooth out rough surfaces of the distal anterior tibia and anterior talar dome. Intraoperative fluoroscopy was  utilized to confirm excision of the exostoses and anterior ankle impingement. The ankle joint was again put through range of motion and dorsiflexion range was noted to be substantially increased from pre-operatively.     The ankle joint and incision were then flushed with copious amounts of normal sterile saline. Capsule was closed with 3-0 Vicryl in a simple interrupted fashion. Subcutaneous tissue was then closed with 3-0 Vicryl in a continuous running fashion. At this  time, the tourniquet was released with adequate reperfusion noted to all digits. Skin was closed with skin staples. At this time, a post-procedural modified saphenous nerve block was performed with 20mL of 0.5%  Marcaine plain. Attention was then directed  to the sinus tarsi region, where 4mL of dexamethasone was injected into the left subtalar joint. The incision was then dressed with bacitracin ointment, adaptic, 4x4s, Kerlix roll secured with steri-strips, and modified Wu compression dressing. Patient  will maintain toe-touch weight-bearing in a CAM boot with crutches until outpatient follow-up.     Patient tolerated the anesthesia and procedure very well, was transferred to the PACU with all vital signs stable and brisk capillary refill time noted in all toes. Patient's intraoperative and postoperative disposition was discussed with the family in  the postoperative consultation area.     Dr. Jesus Joe was present and scrubbed for the entire case. Elements of the case which included but were not limited to incision, dissection, and preparation of site were performed by resident Dr. Ba Newell, under their direct supervision.  All critical elements of this case were performed by Dr. Jesus Joe DPM.     SURGERY START TIME: 11:06  SURGERY END TIME: 11:57    POSITION: Supine   ESTIMATED BLOOD LOSS: Minimal   MATERIALS: 3-0 Vicryl, skin staples  DRAINS: None   SPECIMENS: Left ankle synovitis, left tibial exostoses  COMPLICATIONS: None  CONDITION: Satisfactory                        Attestation:   Note Completion:  Attending Attestation I was present for the entire procedure    I am a: Resident/Fellow         Electronic Signatures:  Ba Newell (FAIZAN (Resident))  (Signed 14-Jul-2023 23:37)   Authored: Post-Operative Note, Chart Review, Note Completion  Jesus Joe)  (Signed 28-Jul-2023 09:28)   Authored: Note Completion   Co-Signer: Post-Operative Note, Chart Review, Note Completion      Last Updated: 28-Jul-2023 09:28 by Jesus Joe (FAIZAN)

## 2023-10-10 ENCOUNTER — TELEPHONE (OUTPATIENT)
Dept: CARDIOLOGY | Facility: CLINIC | Age: 68
End: 2023-10-10
Payer: MEDICARE

## 2023-10-10 DIAGNOSIS — I48.0 PAROXYSMAL ATRIAL FIBRILLATION (MULTI): Primary | ICD-10-CM

## 2023-10-10 NOTE — TELEPHONE ENCOUNTER
Patient called and left a voicemail. She stated that she has had A-fib on and off and the weekend has been worse. She stated that it has increased due to stress and is asking if she can increase any of her medications.   I called patient back and left message for her to increase Flecainide to 100 mg this afternoon. Do not take the 50 mg tonight and come tomorrow for EKG early before her office hours start.   I called patient and she did get my message earlier. She increased Flecainide and is drinking more water . Her a-fib is gone and she will see Luz FIGUEROA NP in November. She does not want to come for EKG.

## 2023-10-17 ENCOUNTER — LAB (OUTPATIENT)
Dept: LAB | Facility: LAB | Age: 68
End: 2023-10-17
Payer: MEDICARE

## 2023-10-17 DIAGNOSIS — E87.1 HYPONATREMIA: ICD-10-CM

## 2023-10-17 LAB
ANION GAP SERPL CALC-SCNC: 14 MMOL/L (ref 10–20)
BUN SERPL-MCNC: 10 MG/DL (ref 6–23)
CALCIUM SERPL-MCNC: 10 MG/DL (ref 8.6–10.3)
CHLORIDE SERPL-SCNC: 93 MMOL/L (ref 98–107)
CO2 SERPL-SCNC: 28 MMOL/L (ref 21–32)
CREAT SERPL-MCNC: 0.61 MG/DL (ref 0.5–1.05)
CREAT UR-MCNC: 180.7 MG/DL (ref 20–320)
GFR SERPL CREATININE-BSD FRML MDRD: >90 ML/MIN/1.73M*2
GLUCOSE SERPL-MCNC: 74 MG/DL (ref 74–99)
OSMOLALITY SERPL: 267 MOSM/KG (ref 280–300)
POTASSIUM SERPL-SCNC: 4.7 MMOL/L (ref 3.5–5.3)
SODIUM SERPL-SCNC: 130 MMOL/L (ref 136–145)
SODIUM UR-SCNC: 59 MMOL/L
SODIUM/CREAT UR-RTO: 33 MMOL/G CREAT

## 2023-10-17 PROCEDURE — 36415 COLL VENOUS BLD VENIPUNCTURE: CPT

## 2023-10-17 PROCEDURE — 84300 ASSAY OF URINE SODIUM: CPT

## 2023-10-17 PROCEDURE — 82570 ASSAY OF URINE CREATININE: CPT

## 2023-10-17 PROCEDURE — 80048 BASIC METABOLIC PNL TOTAL CA: CPT

## 2023-10-17 PROCEDURE — 83930 ASSAY OF BLOOD OSMOLALITY: CPT

## 2023-10-20 ENCOUNTER — OFFICE VISIT (OUTPATIENT)
Dept: PRIMARY CARE | Facility: CLINIC | Age: 68
End: 2023-10-20
Payer: MEDICARE

## 2023-10-20 ENCOUNTER — LAB (OUTPATIENT)
Dept: LAB | Facility: LAB | Age: 68
End: 2023-10-20
Payer: MEDICARE

## 2023-10-20 VITALS
WEIGHT: 149.5 LBS | SYSTOLIC BLOOD PRESSURE: 125 MMHG | OXYGEN SATURATION: 99 % | DIASTOLIC BLOOD PRESSURE: 77 MMHG | TEMPERATURE: 94.3 F | BODY MASS INDEX: 24.88 KG/M2 | HEART RATE: 68 BPM

## 2023-10-20 DIAGNOSIS — E03.9 HYPOTHYROIDISM, ADULT: ICD-10-CM

## 2023-10-20 DIAGNOSIS — F41.9 ANXIETY: ICD-10-CM

## 2023-10-20 DIAGNOSIS — M85.9 DISORDER OF BONE DENSITY AND STRUCTURE, UNSPECIFIED: ICD-10-CM

## 2023-10-20 DIAGNOSIS — E87.1 HYPONATREMIA: ICD-10-CM

## 2023-10-20 DIAGNOSIS — Z12.11 SCREEN FOR COLON CANCER: ICD-10-CM

## 2023-10-20 DIAGNOSIS — Z12.31 ENCOUNTER FOR SCREENING MAMMOGRAM FOR BREAST CANCER: ICD-10-CM

## 2023-10-20 DIAGNOSIS — E55.9 VITAMIN D INSUFFICIENCY: ICD-10-CM

## 2023-10-20 DIAGNOSIS — I48.91 ATRIAL FIBRILLATION, CONTROLLED (MULTI): ICD-10-CM

## 2023-10-20 DIAGNOSIS — Z71.89 CARDIAC RISK COUNSELING: ICD-10-CM

## 2023-10-20 DIAGNOSIS — E78.2 HYPERLIPIDEMIA, MIXED: ICD-10-CM

## 2023-10-20 DIAGNOSIS — Z86.79 H/O: HYPERTENSION: ICD-10-CM

## 2023-10-20 DIAGNOSIS — Z71.89 ADVANCE DIRECTIVE DISCUSSED WITH PATIENT: ICD-10-CM

## 2023-10-20 DIAGNOSIS — Z00.00 ROUTINE HEALTH MAINTENANCE: Primary | ICD-10-CM

## 2023-10-20 DIAGNOSIS — Z13.89 SCREENING FOR MULTIPLE CONDITIONS: ICD-10-CM

## 2023-10-20 PROBLEM — R93.1 ABNORMAL SCREENING CARDIAC CT: Status: ACTIVE | Noted: 2023-10-20

## 2023-10-20 LAB
ANION GAP SERPL CALC-SCNC: 13 MMOL/L (ref 10–20)
BUN SERPL-MCNC: 10 MG/DL (ref 6–23)
CALCIUM SERPL-MCNC: 10 MG/DL (ref 8.6–10.3)
CHLORIDE SERPL-SCNC: 94 MMOL/L (ref 98–107)
CO2 SERPL-SCNC: 29 MMOL/L (ref 21–32)
CREAT SERPL-MCNC: 0.62 MG/DL (ref 0.5–1.05)
GFR SERPL CREATININE-BSD FRML MDRD: >90 ML/MIN/1.73M*2
GLUCOSE SERPL-MCNC: 58 MG/DL (ref 74–99)
POTASSIUM SERPL-SCNC: 4.3 MMOL/L (ref 3.5–5.3)
SODIUM SERPL-SCNC: 132 MMOL/L (ref 136–145)

## 2023-10-20 PROCEDURE — G0446 INTENS BEHAVE THER CARDIO DX: HCPCS | Performed by: INTERNAL MEDICINE

## 2023-10-20 PROCEDURE — 1159F MED LIST DOCD IN RCRD: CPT | Performed by: INTERNAL MEDICINE

## 2023-10-20 PROCEDURE — G0444 DEPRESSION SCREEN ANNUAL: HCPCS | Performed by: INTERNAL MEDICINE

## 2023-10-20 PROCEDURE — 1160F RVW MEDS BY RX/DR IN RCRD: CPT | Performed by: INTERNAL MEDICINE

## 2023-10-20 PROCEDURE — 36415 COLL VENOUS BLD VENIPUNCTURE: CPT

## 2023-10-20 PROCEDURE — 80048 BASIC METABOLIC PNL TOTAL CA: CPT

## 2023-10-20 PROCEDURE — G0439 PPPS, SUBSEQ VISIT: HCPCS | Performed by: INTERNAL MEDICINE

## 2023-10-20 PROCEDURE — 99497 ADVNCD CARE PLAN 30 MIN: CPT | Performed by: INTERNAL MEDICINE

## 2023-10-20 PROCEDURE — 99214 OFFICE O/P EST MOD 30 MIN: CPT | Performed by: INTERNAL MEDICINE

## 2023-10-20 PROCEDURE — 1036F TOBACCO NON-USER: CPT | Performed by: INTERNAL MEDICINE

## 2023-10-20 RX ORDER — ALPRAZOLAM 0.5 MG/1
0.5 TABLET ORAL 3 TIMES DAILY PRN
Qty: 21 TABLET | Refills: 0 | Status: SHIPPED | OUTPATIENT
Start: 2023-10-20 | End: 2023-12-05 | Stop reason: SDUPTHER

## 2023-10-20 RX ORDER — TRIAMCINOLONE ACETONIDE 1 MG/G
1 CREAM TOPICAL AS NEEDED
COMMUNITY
Start: 2023-03-13 | End: 2023-10-20 | Stop reason: ALTCHOICE

## 2023-10-20 RX ORDER — DENOSUMAB 60 MG/ML
60 INJECTION SUBCUTANEOUS
Qty: 1 ML | Refills: 1 | Status: SHIPPED | OUTPATIENT
Start: 2023-10-20

## 2023-10-20 ASSESSMENT — PATIENT HEALTH QUESTIONNAIRE - PHQ9
SUM OF ALL RESPONSES TO PHQ9 QUESTIONS 1 AND 2: 0
1. LITTLE INTEREST OR PLEASURE IN DOING THINGS: NOT AT ALL
2. FEELING DOWN, DEPRESSED OR HOPELESS: NOT AT ALL

## 2023-10-20 NOTE — ASSESSMENT & PLAN NOTE
Annual Wellness exam completed   Preventive Health history reviewed:  Labs ordered    Mammogram ordered  Cscope ordered

## 2023-10-20 NOTE — PROGRESS NOTES
Chief Complaint:   Medicare Wellness Exam/Comprehensive Problem Focused Follow Up and Physical Exam    HPI: In July had  LEFT ANKLE ARTHROTOMY WITH SYNEVECTOMY/ LET PARTIAL TIBIA EXCISION/ LEFT SUBTALAR JOINT INJECTION WITH C-ARM   Doing well    She went off lisinopril and BP is fine  Was causing vertigo - bad    Labs reviewed:  Component      Latest Ref Rng 9/1/2023   GLUCOSE      74 - 99 mg/dL 87    SODIUM      136 - 145 mmol/L 130 (L)    POTASSIUM      3.5 - 5.3 mmol/L 4.8    CHLORIDE      98 - 107 mmol/L 94 (L)    Bicarbonate      21 - 32 mmol/L 28    Anion Gap      10 - 20 mmol/L 13    Blood Urea Nitrogen      6 - 23 mg/dL 26 (H)    Creatinine      0.50 - 1.05 mg/dL 0.76    GFR Female      >90 mL/min/1.73m2 84    Calcium      8.6 - 10.3 mg/dL 10.5 (H)    Albumin      3.4 - 5.0 g/dL 4.5    Alkaline Phosphatase      33 - 136 U/L 80    Total Protein      6.4 - 8.2 g/dL 6.9    AST      9 - 39 U/L 21    Bilirubin Total      0.0 - 1.2 mg/dL 1.3 (H)    ALT      7 - 45 U/L 13    Color, Urine      STRAW,YELLOW  YELLOW    Appearance, Urine      CLEAR  CLEAR    Specific Gravity, Urine      1.005 - 1.035  1.019    pH, Urine      5.0 - 8.0  6.0    Protein, Urine      NEGATIVE mg/dL NEGATIVE    Glucose, Urine      NEGATIVE mg/dL NEGATIVE    Blood, Urine      NEGATIVE  NEGATIVE    Ketones, Urine      NEGATIVE mg/dL NEGATIVE    Bilirubin, Urine      NEGATIVE  NEGATIVE    Urobilinogen, Urine      0.0 - 1.9 mg/dL <2.0    Nitrite, Urine      NEGATIVE  NEGATIVE    Leukocyte Esterase, Urine      NEGATIVE  LARGE (3+) !    EGFR      >60 mL/min/1.73m*2    CHOLESTEROL      0 - 199 mg/dL 256 (H)    HDL CHOLESTEROL      mg/dL 120.2    Cholesterol/HDL Ratio 2.1    LDL      0 - 99 mg/dL 126 (H)    VLDL      0 - 40 mg/dL 10    TRIGLYCERIDES      0 - 149 mg/dL 50    WBC, Urine      0 - 5 /HPF 31 !    RBC, Urine      0 - 5 /HPF 2    Squamous Epithelial Cells, Urine      /HPF 4    Mucus, Urine      /LPF 1+    Vitamin D, 25-Hydroxy, Total       ng/mL 60       Component      Latest Ref Rng 9/20/2023   Parathyroid Hormone, Intact      18.5 - 88.0 pg/mL 18.5    Osmolality, Serum      280 - 300 mOsm/kg H2O 273 (L)         Assessment and Plan:  Problem List Items Addressed This Visit          High    Routine health maintenance - Primary    Overview         Declines Pneumonia and Influenza vaccines      MODERNAL COVID 19 Vaccine 12/31/20, 2/3/21, 12/23/21      DT(PEDIATRIC)8/1/1998       TDAP 8/13/21      Heptavax (discontinued)8/1/1985, 2/1/1985, 1/1/1985; 7/3/20       Influenza Vaccine, Split-Non Spec11/12/2001       Cscope 7/192006 (10yrs); 12/19 (5yrs)      Mamm 10/24/17; 6/20, 7/20/21      BMD 3/21/13; 10/24/17; 11/6/20, 1/10/23      PAP/HPV 4/5/11 (-);  9/14/18      Hep C Ab (-) 6/26/20 6/21: CT Abd (-) AAA         Current Assessment & Plan     Annual Wellness exam completed   Preventive Health history reviewed:  Labs ordered    Mammogram ordered  Cscope ordered                  Medium    Advance directive discussed with patient    Overview     10/20/23: [facility] [place] (daughter) is her HCPOA  DNR Arrest         Anxiety    Overview     RONNY: 7/12/23 = 17  Uses xanax sparingly         Relevant Medications    ALPRAZolam (Xanax) 0.5 mg tablet    Atrial fibrillation, controlled (CMS/HCC)    Overview     On Eliquis, Anti-arrhythmic and magnesium      prn CCB      Failed Propafenone      Started on Flecainide 10/22      s/p Corvert chemical cardioversion 2/22.       Clinically maintaining sinus rhythm      Managed by [facility]         Relevant Orders    Urinalysis with Reflex Microscopic    Cardiac risk counseling    Overview     10/20/23: Current 10-Year ASCVD Risk:  8.71% - Intermediate Risk    ASA not rec'd per updated guidelines  On Eliquis and bruises easily also         Disorder of bone density and structure, unspecified    Overview     Started Fosamax 2020, stopped that 10/21 due to side effects  H/O Clavicle fracture 2023  BMD 11/6/20:  -2.3(radius), -1.5 (femur)   BMD 1/23: Right Forearm T Score: -2.8, -1.4 (femur)  Prolia given 11/12/21- she will self-administer  Continue         Relevant Medications    Prolia 60 mg/mL syringe    Encounter for screening mammogram for breast cancer    Relevant Orders    BI mammo bilateral screening tomosynthesis    H/O: hypertension    Overview     Goal <130/80  HCTZ caused hyponatremia  ACEi caused vertigo  Norvasc was used for Raynauds and failed it  Nifedipine cause AE (cold)         Current Assessment & Plan     If needs BP med In future would try low dose Bystolic         Hyperlipidemia, mixed    Overview     Diet managed  Goal LDL <100  Failed Crestor 2x/week(intolerant)  On RYR         Current Assessment & Plan     She will ask her cardiologist about Repatha use         Relevant Orders    Comprehensive Metabolic Panel    CBC and Auto Differential    Lipid Panel    Hyponatremia    Overview     Likely due to ACEi  ACEi stopped 10/23         Current Assessment & Plan     See if resolved         Relevant Orders    Basic metabolic panel    Hypothyroidism, adult    Overview     TSH > 3.5, symptomatic      Goal TSH 1-2      Onsynthroid         Relevant Orders    TSH with reflex to Free T4 if abnormal    Comprehensive Metabolic Panel    CBC and Auto Differential    Lipid Panel    Screen for colon cancer    Relevant Orders    Colonoscopy Screening; Average Risk Patient    Screening for multiple conditions [Z13.89]    Overview     Depression screening completed using the PHQ-2 questions with results documented in the chart/encounter  (See Rooming Screenings for documentation)           Vitamin D insufficiency    Overview     Goal 30-40 (hx kidney stone)         Relevant Orders    Vitamin D 25-Hydroxy,Total (for eval of Vitamin D levels)         Active Problem List  Patient Active Problem List   Diagnosis    Anxiety    Atrial fibrillation, controlled (CMS/HCC)    BPPV (benign paroxysmal positional vertigo)    Chronic  insomnia    Disorder of bone density and structure, unspecified    Diverticulosis of colon    History of colon polyps    Hyperlipidemia, mixed    H/O: hypertension    Hyponatremia    Hypothyroidism, adult    Lumbar spondylosis    Other specified disorders of temporomandibular joint    PAC (premature atrial contraction)    Primary Raynaud's phenomenon    Scoliosis    Spinal stenosis of lumbar region    Vitamin D insufficiency    Advance directive discussed with patient    Cardiac risk counseling    H/O gastritis    History of kidney stones    Routine health maintenance    Abnormal screening cardiac CT    Screen for colon cancer    Encounter for screening mammogram for breast cancer    Screening for multiple conditions [Z13.89]       Comprehensive Medical/Surgical/Social/Family History  Past Medical History:   Diagnosis Date    Abnormal screening cardiac CT     6/20:1. Coronary artery calcium score of 137*. (LAD, LCX)     2. 77th percentile** for age, gender in asymptomatic patients.     *Coronary Artery  Agatston score    H/O bone density study     11/6/20: -2.3(radius), -1.5 (femur)     10/24/17: -1.3     3/21/13: THE LOWEST T-SCORE IS -1.8      1) DIAGNOSIS (based on BMD alone): OSTEOPENIA    H/O bone density study 10/20/2023    Right Forearm  Bone Density: 0.643 g/cm2 . . T Score: -2.8, Femur -1.4    H/O chest x-ray     5/21: normal    H/O CT scan of abdomen     5/2021: .  Colonic diverticulosis. Findings consistent with acute diverticulitis involving the     distal descending colon. No evidence of bowel obstruction, free intraperitoneal air or     abnormal intra-abdominal fluid collection.    H/O diagnostic ultrasound     7/20: US kidney: normal    H/O echocardiogram     6/21: CONCLUSIONS:     1. The left ventricular systolic function is normal with a 60-65% estimated ejection     fraction.     2. Spectral Doppler shows an impaired relaxation pattern of left ventricular diastolic filling     Mild MR/TR    History  of Holter monitoring     : Underlying AFIB with RVR     : NSR with occ isolated PVCs (when had sx)    History of MRI of lumbar spine     3/13: Multilevel degenerative changes with various degrees of canal and foraminal narrowing seen throughout the lumbar spine. Findings are most significant at the L3 -- L4 level where there is severe canal stenosis, right subarticular recess narrowing, and moderate to severe right foraminal narrowing.Mod-sev levoconvex scoliosis. Subtle edema involving the upper S2 vertebra, perhaps rep insuff fx.    History of nuclear stress test     : normal     Past Surgical History:   Procedure Laterality Date    ANKLE SURGERY  2023    1. LEFT ANKLE ARTHROTOMY WITH SYNEVECTOMY/ LET PARTIAL TIBIA EXCISION/ LEFT SUBTALAR JOINT INJECTION WITH C-ARM    BACK SURGERY      L2-S1 discectomy and fusio    BELT ABDOMINOPLASTY      Abdominoplasty    BUNIONECTOMY      Bunionectomy    CARDIAC CATHETERIZATION  2022    Cardiac catheterization    CARDIOVERSION  2022    Cardioversion    CATARACT EXTRACTION      Cataract surgery    COLONOSCOPY  2019: diverticulosis, 2mm polyp (TA)     06: Impression:Hyperplastic polyp sigmoid-removed.  Normal rectum. Normal      cecum. Normal ascending colon and ileocecal valve. A single diminutive      polyp found in the sigmoid; removed by cold biopsy polypectomy.    ESOPHAGOGASTRODUODENOSCOPY  10/08/2021    10/8/21: - Normal examined duodenum. - Erythematous mucosa in the antrum. Biopsied.- 3 cm hiatal hernia.    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[address]    Nonsmoker    ETOH socially     Tobacco/Alcohol/Opioid use, as well as Illicit Drug Use was screened for/reviewed and documented in Social Documentation section of the chart and medication list as appropriate    Allergies and Medications  Opioids-meperidine and related, Alendronate, Cysteine, Lisinopril, Nifedipine, Rivaroxaban, and Rosuvastatin  Current Outpatient Medications   Medication Instructions    ALPRAZolam (XANAX) 0.5 mg, oral, 3 times daily PRN    calcium carbonate (Tums) 200 mg calcium chewable tablet 1 tablet, oral, Daily    cholecalciferol (VITAMIN D-3) 5,000 mcg, oral, Daily    Eliquis 5 mg, oral, 2 times daily    ferrous sulfate 325 (65 Fe) MG tablet oral    flecainide (TAMBOCOR) 50 mg, oral, 2 times daily    levothyroxine (SYNTHROID, LEVOXYL) 75 mcg, oral, Daily before breakfast    multivitamin tablet 1 tablet, oral, Daily    omega 3-dha-epa-fish oil (Fish OiL) 1,000 mg (120 mg-180 mg) capsule 1 capsule, oral, Daily    Prolia 60 mg, subcutaneous, Every 6 months    red yeast rice 600 mg capsule 1 capsule, oral, Daily    vitamins A,C,E-zinc-copper (PreserVision AREDS) 2,148 mcg-113 mg-45 mg-17.4mg tablet 1 tablet, oral, Daily     Medications and Supplements  prescribed by me and other practitioners or clinical pharmacist (such as prescriptions, OTC's, herbal therapies and supplements) were reviewed and documented in the medical record.      Activities of Daily Living  In your present state of health, do you have any difficulty performing the following activities?:   Preparing food and eating?: No  Bathing yourself: No  Getting dressed: No  Using the toilet:No  Moving around from place to place: No  In the past year have you fallen or had a near fall?:No  Able to manage finances independently: Yes  Able to perform grocery shopping: Yes  Able to manage medications independently: Yes  Able to do  housework independently: Yes  Patient self-assessment of health status? Good    Patient Care Team:  Kathleen Rice MD as PCP - General  [facility]athleen Rice [facility]  Petra Sequeira MD as Consulting Physician (Cardiology)  Jesus Joe DPM as Referring Physician (Podiatry)     Current exercise habits: rides a bike   Dietary issues discussed: Yes  Hearing difficulties: No  Safe in current home environment: Yes  Visual Acuity assessed: No  Cognitive Impairment No    Depression Screening  Depression screening (15m) completed using the PHQ-2 questions with results documented in the chart/encounter  (Rooming Screening section for documentation, or note for additional information)    Cardiac Risk Assessment  Cardiovascular risk was discussed and, if needed, lifestyle modifications recommended, including nutritional choices, exercise, and elimination of habits contributing to risk.   We agreed on a plan to reduce the current cardiovascular risk based on above discussion as needed.     Aspirin use/disuse was discussed and documented in the Problem List of the medical record (under Cardiac Risk Counseling) after reviewing the updated guidelines below:  Consider low dose Aspirin ( mg) use if the benefit for cardiovascular disease prevention outweighs risk for bleeding complications.   In general, low dose ASA should be considered:  In patients WITHOUT prior MI/stroke/PAD (primary prevention):   a. Age <60: Use if 10-year cardiovascular disease risk >20%, with discussion of risks and benefits with patient  b. Age 60-<70: Use if 10-year cardiovascular disease risk >20% and low bleeding (e.g., gastrointestinal) risk, with discussion of risks and benefits with patient  c. Age >=70: Do not use    In patients WITH prior MI/stroke/PAD (secondary prevention):   Generally use unless extremely high bleeding (e.g., gastrointenstinal) risk, with discussion of risks and benefits with patient    Advance  Directives Discussion  Advanced Care Planning (including a Living Will, Healthcare POA, as well as specific end of life choices and/or directives), was discussed with the patient and/or surrogate, voluntarily, and details of that discussion documented in the Problem List (under Advanced Directives Discussion) of the medical record.    (~16 min spent discussing above)     ROS otherwise negative aside from what was mentioned above in HPI.    Vitals  /77   Pulse 68   Temp 34.6 °C (94.3 °F)   Wt 67.8 kg (149 lb 8 oz)   SpO2 99%   BMI 24.88 kg/m²   Body mass index is 24.88 kg/m².  Physical Exam  Gen: Alert, NAD  HEENT:  Unremarkable  Neck:  No FÉLIX  Respiratory:  Lungs CTAB  Cardiovascular:  Heart RRR  Neuro:  Gross motor and sensory intact  Skin:  No suspicious lesions present  Breast: No masses, or axillary lymphadenopathy  Gyn: Normal pelvic exam: no uterine masses or cervical lesions, or CMT; no vaginal D/C. No ovarian or adnexal masses; No external vaginal or anal/perineal lesions (Pt declined chaperone)    During the course of the visit the patient was educated and counseled about age appropriate screening and preventive services. Completed preventive screenings were documented in the chart and orders were placed for outstanding screenings/procedures as documented in the Assessment and Plan.    Patient Instructions (the written plan) was given to the patient at check out.

## 2023-10-30 ENCOUNTER — HOSPITAL ENCOUNTER (OUTPATIENT)
Dept: RADIOLOGY | Facility: HOSPITAL | Age: 68
Discharge: HOME | End: 2023-10-30
Payer: MEDICARE

## 2023-10-30 ENCOUNTER — HOSPITAL ENCOUNTER (OUTPATIENT)
Dept: RADIOLOGY | Facility: EXTERNAL LOCATION | Age: 68
Discharge: HOME | End: 2023-10-30

## 2023-10-30 DIAGNOSIS — Z12.31 ENCOUNTER FOR SCREENING MAMMOGRAM FOR BREAST CANCER: ICD-10-CM

## 2023-10-30 PROCEDURE — 77063 BREAST TOMOSYNTHESIS BI: CPT

## 2023-10-30 PROCEDURE — 77067 SCR MAMMO BI INCL CAD: CPT | Performed by: RADIOLOGY

## 2023-10-30 PROCEDURE — 77063 BREAST TOMOSYNTHESIS BI: CPT | Performed by: RADIOLOGY

## 2023-11-09 ENCOUNTER — OFFICE VISIT (OUTPATIENT)
Dept: CARDIOLOGY | Facility: CLINIC | Age: 68
End: 2023-11-09
Payer: MEDICARE

## 2023-11-09 VITALS
SYSTOLIC BLOOD PRESSURE: 118 MMHG | HEIGHT: 64 IN | DIASTOLIC BLOOD PRESSURE: 72 MMHG | BODY MASS INDEX: 24.92 KG/M2 | HEART RATE: 72 BPM | WEIGHT: 146 LBS

## 2023-11-09 DIAGNOSIS — I49.1 PAC (PREMATURE ATRIAL CONTRACTION): ICD-10-CM

## 2023-11-09 DIAGNOSIS — Z86.79 H/O: HYPERTENSION: ICD-10-CM

## 2023-11-09 DIAGNOSIS — E03.9 HYPOTHYROIDISM, ADULT: ICD-10-CM

## 2023-11-09 DIAGNOSIS — E78.2 HYPERLIPIDEMIA, MIXED: ICD-10-CM

## 2023-11-09 DIAGNOSIS — I48.91 ATRIAL FIBRILLATION, CONTROLLED (MULTI): Primary | ICD-10-CM

## 2023-11-09 DIAGNOSIS — R93.1 ABNORMAL SCREENING CARDIAC CT: ICD-10-CM

## 2023-11-09 PROCEDURE — 1159F MED LIST DOCD IN RCRD: CPT | Performed by: NURSE PRACTITIONER

## 2023-11-09 PROCEDURE — 99213 OFFICE O/P EST LOW 20 MIN: CPT | Performed by: NURSE PRACTITIONER

## 2023-11-09 PROCEDURE — 1160F RVW MEDS BY RX/DR IN RCRD: CPT | Performed by: NURSE PRACTITIONER

## 2023-11-09 PROCEDURE — 1036F TOBACCO NON-USER: CPT | Performed by: NURSE PRACTITIONER

## 2023-11-09 NOTE — PROGRESS NOTES
CARDIOLOGY OFFICE VISIT      CHIEF COMPLAINT  Chief Complaint   Patient presents with    Atrial Fibrillation       HISTORY OF PRESENT ILLNESS  HPI  The patient is a 68-year-old  female who is followed for paroxysmal atrial fibrillation controlled on flecainide and anticoagulated with Eliquis.  She has failed a previous trial of propafenone.  She has a history of hypertension, hypothyroidism on replacement therapy, and anxiety disorder.  Review of the medical records reveals the patient underwent left ankle arthrotomy, partial tibial excision and joint injection on July 14, 2023.  Patient states that she is still recovering from that procedure.  She does have swelling in the left lower extremity but states that it is improving.  She denies chest pain, palpitations, shortness of breath, or abdominal distention.  She states that she has been diagnosed with vertigo which comes and goes.  She denies any sintia syncopal episodes.  Past Medical History  Past Medical History:   Diagnosis Date    Abnormal screening cardiac CT     6/20:1. Coronary artery calcium score of 137*. (LAD, LCX)     2. 77th percentile** for age, gender in asymptomatic patients.     *Coronary Artery  Agatston score    H/O bone density study     11/6/20: -2.3(radius), -1.5 (femur)     10/24/17: -1.3     3/21/13: THE LOWEST T-SCORE IS -1.8      1) DIAGNOSIS (based on BMD alone): OSTEOPENIA    H/O bone density study 10/20/2023    Right Forearm  Bone Density: 0.643 g/cm2 . . T Score: -2.8, Femur -1.4    H/O chest x-ray     5/21: normal    H/O CT scan of abdomen     5/2021: .  Colonic diverticulosis. Findings consistent with acute diverticulitis involving the     distal descending colon. No evidence of bowel obstruction, free intraperitoneal air or     abnormal intra-abdominal fluid collection.    H/O diagnostic ultrasound     7/20: US kidney: normal    H/O echocardiogram     6/21: CONCLUSIONS:     1. The left ventricular systolic function is  normal with a 60-65% estimated ejection     fraction.     2. Spectral Doppler shows an impaired relaxation pattern of left ventricular diastolic filling     Mild MR/TR    History of Holter monitoring     1/19: Underlying AFIB with RVR     5/21: NSR with occ isolated PVCs (when had sx)    History of MRI of lumbar spine     3/13: Multilevel degenerative changes with various degrees of canal and foraminal narrowing seen throughout the lumbar spine. Findings are most significant at the L3 -- L4 level where there is severe canal stenosis, right subarticular recess narrowing, and moderate to severe right foraminal narrowing.Mod-sev levoconvex scoliosis. Subtle edema involving the upper S2 vertebra, perhaps rep insuff fx.    History of nuclear stress test     9/12: normal       Social History  Social History     Tobacco Use    Smoking status: Never    Smokeless tobacco: Never   Substance Use Topics    Alcohol use: Yes     Comment: socially    Drug use: Never       Family History     Family History   Problem Relation Name Age of Onset    Osteoporotic fracture Mother          see social doc    Other (carotid artery stenosis) Father      Coronary artery disease Father      Other (coronary artery bypass graft) Father          Allergies:  Allergies   Allergen Reactions    Opioids-Meperidine And Related Anaphylaxis     ANAPHYLAXSIS    Alendronate Unknown    Cysteine Other     sores on legs    Lisinopril Dizziness     vertigo    Nifedipine Unknown     cold    Rivaroxaban Other     cold    Rosuvastatin Unknown        Outpatient Medications:  Current Outpatient Medications   Medication Instructions    ALPRAZolam (XANAX) 0.5 mg, oral, 3 times daily PRN    calcium carbonate (Tums) 200 mg calcium chewable tablet 1 tablet, oral, Daily    cholecalciferol (VITAMIN D-3) 5,000 mcg, oral, Daily    Eliquis 5 mg, oral, 2 times daily    ferrous sulfate 325 (65 Fe) MG tablet oral    flecainide (TAMBOCOR) 50 mg, oral, 2 times daily     levothyroxine (SYNTHROID, LEVOXYL) 75 mcg, oral, Daily before breakfast    multivitamin tablet 1 tablet, oral, Daily    omega 3-dha-epa-fish oil (Fish OiL) 1,000 mg (120 mg-180 mg) capsule 1 capsule, oral, Daily    Prolia 60 mg, subcutaneous, Every 6 months    red yeast rice 600 mg capsule 1 capsule, oral, Daily    vitamins A,C,E-zinc-copper (PreserVision AREDS) 2,148 mcg-113 mg-45 mg-17.4mg tablet 1 tablet, oral, Daily          REVIEW OF SYSTEMS  Review of Systems   All other systems reviewed and are negative.        VITALS  Vitals:    11/09/23 0806   BP: 118/72   Pulse: 72       PHYSICAL EXAM  Vitals and nursing note reviewed.   Constitutional:       Appearance: Normal appearance.   HENT:      Head: Normocephalic.   Neck:      Vascular: No JVD.   Cardiovascular:      Rate and Rhythm: Normal rate and regular rhythm.      Pulses: Normal pulses.      Heart sounds: Normal heart sounds.      1+ left lower extremity edema noted.  Pulmonary:      Effort: Pulmonary effort is normal.      Breath sounds: Normal breath sounds.   Abdominal:      General: Bowel sounds are normal.      Palpations: Abdomen is soft.   Musculoskeletal:         General: Normal range of motion.      Cervical back: Normal range of motion.   Skin:     General: Skin is warm and dry.   Neurological:      General: No focal deficit present.      Mental Status: She is alert and oriented to person, place, and time.      Motor: Motor function is intact.   Psychiatric:         Attention and Perception: Attention and perception normal.         Mood and Affect: Mood and affect normal.         Speech: Speech normal.         Behavior: Behavior normal. Behavior is cooperative.         Thought Content: Thought content normal.         Cognition and Memory: Cognition and memory normal.     Labs and testing: Twelve-lead EKG reveals sinus rhythm with first-degree AV block, [place] intervals 220 ms.  Isolated PACs were also noted.  QRS durations 86 ms,  ms, QTc 411 ms.   No acute ischemic changes are noted.  Lab work dated September 20, 2023 revealed a blood sugar 75, sodium 133, potassium 4.6, GFR greater than 90.      ASSESSMENT AND PLAN      Clinical impressions:  1. Paroxysmal atrial fibrillation on flecainide and anticoagulated with Eliquis.  Failure of propafenone to control the arrhythmia in the past.  2. Macular rash over the anterior and posterior torso and all 4 extremities.  3. Hypertension, controlled with a blood pressure today 118/72.  4. Hypothyroidism on replacement therapy.  5. Anxiety utilizing Xanax as needed.  6. Overweight with a BMI of 25.06.      7.  Vertigo.      8.  Left ankle arthrotomy, partial tibial excision, and joint injection on July 14, 2023.    Recommendations:  1.  Continue current medications as prescribed.  2.  I reviewed exercises for the treatment of vertigo.  3.  Continue lifestyle modifications as discussed.  4.  Follow-up in office with Dr. Sequeira in 6 months or sooner if needed.    Evaluation and note by Luz Anderson, CNP  **Please excuse any errors in grammar or translation related to this dictation.  Voice recognition software was utilized to prepare this document.**

## 2023-11-14 ENCOUNTER — OFFICE VISIT (OUTPATIENT)
Dept: ORTHOPEDIC SURGERY | Facility: CLINIC | Age: 68
End: 2023-11-14
Payer: MEDICARE

## 2023-11-14 ENCOUNTER — ANCILLARY PROCEDURE (OUTPATIENT)
Dept: RADIOLOGY | Facility: CLINIC | Age: 68
End: 2023-11-14
Payer: MEDICARE

## 2023-11-14 DIAGNOSIS — M79.641 RIGHT HAND PAIN: ICD-10-CM

## 2023-11-14 DIAGNOSIS — M19.039 WRIST ARTHRITIS: Primary | ICD-10-CM

## 2023-11-14 PROCEDURE — 99204 OFFICE O/P NEW MOD 45 MIN: CPT | Performed by: STUDENT IN AN ORGANIZED HEALTH CARE EDUCATION/TRAINING PROGRAM

## 2023-11-14 PROCEDURE — 1036F TOBACCO NON-USER: CPT | Performed by: STUDENT IN AN ORGANIZED HEALTH CARE EDUCATION/TRAINING PROGRAM

## 2023-11-14 PROCEDURE — 1160F RVW MEDS BY RX/DR IN RCRD: CPT | Performed by: STUDENT IN AN ORGANIZED HEALTH CARE EDUCATION/TRAINING PROGRAM

## 2023-11-14 PROCEDURE — 1159F MED LIST DOCD IN RCRD: CPT | Performed by: STUDENT IN AN ORGANIZED HEALTH CARE EDUCATION/TRAINING PROGRAM

## 2023-11-14 PROCEDURE — 73130 X-RAY EXAM OF HAND: CPT | Mod: RT,FY

## 2023-11-14 PROCEDURE — 20605 DRAIN/INJ JOINT/BURSA W/O US: CPT | Performed by: STUDENT IN AN ORGANIZED HEALTH CARE EDUCATION/TRAINING PROGRAM

## 2023-11-14 PROCEDURE — 73130 X-RAY EXAM OF HAND: CPT | Mod: RIGHT SIDE | Performed by: RADIOLOGY

## 2023-11-14 RX ORDER — LIDOCAINE HYDROCHLORIDE 10 MG/ML
1 INJECTION INFILTRATION; PERINEURAL
Status: COMPLETED | OUTPATIENT
Start: 2023-11-14 | End: 2023-11-14

## 2023-11-14 RX ADMIN — LIDOCAINE HYDROCHLORIDE 1 ML: 10 INJECTION INFILTRATION; PERINEURAL at 08:35

## 2023-12-05 DIAGNOSIS — F41.9 ANXIETY: ICD-10-CM

## 2023-12-05 RX ORDER — ALPRAZOLAM 0.5 MG/1
0.5 TABLET ORAL 3 TIMES DAILY PRN
Qty: 21 TABLET | Refills: 0 | Status: SHIPPED | OUTPATIENT
Start: 2023-12-05 | End: 2024-03-06 | Stop reason: WASHOUT

## 2023-12-28 RX ORDER — DILTIAZEM HYDROCHLORIDE 30 MG/1
30 TABLET, FILM COATED ORAL 2 TIMES DAILY
COMMUNITY
End: 2024-01-02 | Stop reason: SDUPTHER

## 2024-01-02 DIAGNOSIS — I48.91 ATRIAL FIBRILLATION, CONTROLLED (MULTI): ICD-10-CM

## 2024-01-02 RX ORDER — DILTIAZEM HYDROCHLORIDE 30 MG/1
30 TABLET, FILM COATED ORAL 2 TIMES DAILY
Qty: 180 TABLET | Refills: 3 | Status: SHIPPED | OUTPATIENT
Start: 2024-01-02 | End: 2024-03-06 | Stop reason: WASHOUT

## 2024-01-16 ENCOUNTER — TELEPHONE (OUTPATIENT)
Dept: CARDIOLOGY | Facility: CLINIC | Age: 69
End: 2024-01-16
Payer: MEDICARE

## 2024-01-16 DIAGNOSIS — I48.91 ATRIAL FIBRILLATION, CONTROLLED (MULTI): ICD-10-CM

## 2024-01-16 NOTE — TELEPHONE ENCOUNTER
Patient called and stated she has been in A-Fib for weeks now and can't seem to get herself out of it. She wanted Luz NP to call her to discuss. I called her to get more information and was sent to voicemail. I l/m.  Will fwd to  to schedule patient an appointment to discuss with Lzu.

## 2024-01-17 NOTE — TELEPHONE ENCOUNTER
Pt left message today stating that she left 2 messages.      Call placed to pt, she states that she has been in afib for 2 weeks. Per pt she does not have any symptoms at this time.  She states that she just knows she is out because she checks it.     Per Luz Anderson CNP pt to increase Flecainide 75 mg bid and come in Friday for EKG.  Office to schedule early EKG for pt.    Pt aware of the above recommendation and verbalized an understanding.

## 2024-01-17 NOTE — TELEPHONE ENCOUNTER
Patient left voicemail stating that she has called twice about this, one message being left on Luz's voicemail.  She would like to know what she should do about the A-fib that has been present for 2 weeks now.  She is requesting call back from Luz  [phone]

## 2024-01-24 ENCOUNTER — OFFICE VISIT (OUTPATIENT)
Dept: CARDIOLOGY | Facility: CLINIC | Age: 69
End: 2024-01-24
Payer: MEDICARE

## 2024-01-24 VITALS
HEIGHT: 64 IN | SYSTOLIC BLOOD PRESSURE: 130 MMHG | HEART RATE: 85 BPM | BODY MASS INDEX: 25.27 KG/M2 | WEIGHT: 148 LBS | DIASTOLIC BLOOD PRESSURE: 70 MMHG

## 2024-01-24 DIAGNOSIS — I48.11 LONGSTANDING PERSISTENT ATRIAL FIBRILLATION (MULTI): ICD-10-CM

## 2024-01-24 DIAGNOSIS — Z86.79 H/O: HYPERTENSION: ICD-10-CM

## 2024-01-24 DIAGNOSIS — E03.9 HYPOTHYROIDISM, ADULT: ICD-10-CM

## 2024-01-24 DIAGNOSIS — R55 SYNCOPE AND COLLAPSE: ICD-10-CM

## 2024-01-24 DIAGNOSIS — Z78.9 NEVER SMOKED CIGARETTES: ICD-10-CM

## 2024-01-24 PROCEDURE — 1160F RVW MEDS BY RX/DR IN RCRD: CPT | Performed by: INTERNAL MEDICINE

## 2024-01-24 PROCEDURE — 1159F MED LIST DOCD IN RCRD: CPT | Performed by: INTERNAL MEDICINE

## 2024-01-24 PROCEDURE — 1036F TOBACCO NON-USER: CPT | Performed by: INTERNAL MEDICINE

## 2024-01-24 PROCEDURE — 99214 OFFICE O/P EST MOD 30 MIN: CPT | Performed by: INTERNAL MEDICINE

## 2024-01-24 PROCEDURE — 1157F ADVNC CARE PLAN IN RCRD: CPT | Performed by: INTERNAL MEDICINE

## 2024-01-24 PROCEDURE — 3008F BODY MASS INDEX DOCD: CPT | Performed by: INTERNAL MEDICINE

## 2024-01-24 NOTE — PROGRESS NOTES
CARDIOLOGY OFFICE VISIT      CHIEF COMPLAINT      HISTORY OF PRESENT ILLNESS  The patient states she was doing well until January 1 of this year.  She states she went into atrial fibrillation.  She states she has been on it since then.  She is short of breath and fatigue with this.  She has had some difficulty getting into be seen or even just have an EKG in the office.  She denies chest discomfort.  She states she has been taking her Eliquis regularly without interruption.  She denies presyncope and syncope.  She is requesting a cardioversion.  I told her we will go ahead and set that up.  I will follow her back after the cardioversion.  She denies any problem with her current medications.    EKG: Atrial fibrillation, poor R wave progression, low voltage QRS, results discussed with patient      IMPRESSION:   1. History of syncopal episodes, most likely vasovagal.  2. Essential hypertension.  3. Longstanding persistent atrial fibrillation, recurrent atrial fibrillation since 1/1/2024  4. Raynaud's  5. Hypothyroidism on replacement therapy     Please excuse any errors in grammar or translation related to this dictation. Voice recognition software was utilized to prepare this document.          Past Medical History  Past Medical History:   Diagnosis Date    Abnormal screening cardiac CT     6/20:1. Coronary artery calcium score of 137*. (LAD, LCX)     2. 77th percentile** for age, gender in asymptomatic patients.     *Coronary Artery  Agatston score    H/O bone density study     11/6/20: -2.3(radius), -1.5 (femur)     10/24/17: -1.3     3/21/13: THE LOWEST T-SCORE IS -1.8      1) DIAGNOSIS (based on BMD alone): OSTEOPENIA    H/O bone density study 10/20/2023    Right Forearm  Bone Density: 0.643 g/cm2 . . T Score: -2.8, Femur -1.4    H/O chest x-ray     5/21: normal    H/O CT scan of abdomen     5/2021: .  Colonic diverticulosis. Findings consistent with acute diverticulitis involving the     distal descending colon.  No evidence of bowel obstruction, free intraperitoneal air or     abnormal intra-abdominal fluid collection.    H/O diagnostic ultrasound     7/20: US kidney: normal    H/O echocardiogram     6/21: CONCLUSIONS:     1. The left ventricular systolic function is normal with a 60-65% estimated ejection     fraction.     2. Spectral Doppler shows an impaired relaxation pattern of left ventricular diastolic filling     Mild MR/TR    History of Holter monitoring     1/19: Underlying AFIB with RVR     5/21: NSR with occ isolated PVCs (when had sx)    History of MRI of lumbar spine     3/13: Multilevel degenerative changes with various degrees of canal and foraminal narrowing seen throughout the lumbar spine. Findings are most significant at the L3 -- L4 level where there is severe canal stenosis, right subarticular recess narrowing, and moderate to severe right foraminal narrowing.Mod-sev levoconvex scoliosis. Subtle edema involving the upper S2 vertebra, perhaps rep insuff fx.    History of nuclear stress test     9/12: normal       Social History  Social History     Tobacco Use    Smoking status: Never    Smokeless tobacco: Never   Substance Use Topics    Alcohol use: Yes     Comment: socially    Drug use: Never       Family History     Family History   Problem Relation Name Age of Onset    Osteoporotic fracture Mother          see social doc    Other (carotid artery stenosis) Father      Coronary artery disease Father      Other (coronary artery bypass graft) Father      Peripheral vascular disease Father          Allergies:  Allergies   Allergen Reactions    Demerol [Meperidine] Anaphylaxis    Opioids-Meperidine And Related Anaphylaxis     ANAPHYLAXSIS    Alendronate Unknown    Cysteine Other     sores on legs    Lisinopril Dizziness     vertigo    Nifedipine Unknown     cold    Rivaroxaban Other     cold    Rosuvastatin Unknown        Outpatient Medications:  Current Outpatient Medications   Medication Instructions     ALPRAZolam (XANAX) 0.5 mg, oral, 3 times daily PRN    calcium carbonate (Tums) 200 mg calcium chewable tablet 1 tablet, oral, Daily    cholecalciferol (VITAMIN D-3) 5,000 mcg, oral, Daily    dilTIAZem (CARDIZEM) 30 mg, oral, 2 times daily, As needed for palpitations    Eliquis 5 mg, oral, 2 times daily    ferrous sulfate 325 (65 Fe) MG tablet oral    flecainide (TAMBOCOR) 50 mg, oral, 2 times daily    levothyroxine (SYNTHROID, LEVOXYL) 75 mcg, oral, Daily before breakfast    multivitamin tablet 1 tablet, oral, Daily    omega 3-dha-epa-fish oil (Fish OiL) 1,000 mg (120 mg-180 mg) capsule 1 capsule, oral, Daily    Prolia 60 mg, subcutaneous, Every 6 months    red yeast rice 600 mg capsule 1 capsule, oral, Daily    vitamins A,C,E-zinc-copper (PreserVision AREDS) 2,148 mcg-113 mg-45 mg-17.4mg tablet 1 tablet, oral, Daily          REVIEW OF SYSTEMS  Review of Systems   All other systems reviewed and are negative.        VITALS  There were no vitals filed for this visit.    PHYSICAL EXAM  Vitals and nursing note reviewed.   Constitutional:       Appearance: Healthy appearance.   Eyes:      Conjunctiva/sclera: Conjunctivae normal.      Pupils: Pupils are equal, round, and reactive to light.   Pulmonary:      Effort: Pulmonary effort is normal.      Breath sounds: Normal breath sounds.   Cardiovascular:      PMI at left midclavicular line. Normal rate. Irregularly irregular rhythm.      Murmurs: There is no murmur.      No gallop.  No click. No rub.   Pulses:     Intact distal pulses.   Edema:     Peripheral edema absent.   Musculoskeletal: Normal range of motion. Skin:     General: Skin is warm and dry.   Neurological:      Mental Status: Alert and oriented to person, place and time.           ASSESSMENT AND PLAN  Diagnoses and all orders for this visit:  Longstanding persistent atrial fibrillation (CMS/HCC)  Syncope and collapse  H/O: hypertension  BMI 25.0-25.9,adult  Hypothyroidism, adult  Never smoked  cigarettes      [unfilled]

## 2024-01-24 NOTE — PATIENT INSTRUCTIONS
You will be scheduled for Cardioversion with Dr. Tidwell  Follow up with Dr. Russell Palma after cardioversion with an EKG at the visit    Continue same medications/treatment.  Patient educated on proper medication use.  Please bring all medicines, vitamins and herbal supplements with you when you come to the office.    IClare LPN, am scribing for and in the presence of Dr. Russell Palma MD, FACC

## 2024-01-26 ENCOUNTER — TELEPHONE (OUTPATIENT)
Dept: CARDIOLOGY | Facility: HOSPITAL | Age: 69
End: 2024-01-26

## 2024-01-29 ENCOUNTER — HOSPITAL ENCOUNTER (OUTPATIENT)
Dept: CARDIOLOGY | Facility: HOSPITAL | Age: 69
Discharge: HOME | End: 2024-01-29
Payer: MEDICARE

## 2024-01-29 ENCOUNTER — ANESTHESIA (OUTPATIENT)
Dept: CARDIOLOGY | Facility: HOSPITAL | Age: 69
End: 2024-01-29
Payer: MEDICARE

## 2024-01-29 ENCOUNTER — ANESTHESIA EVENT (OUTPATIENT)
Dept: CARDIOLOGY | Facility: HOSPITAL | Age: 69
End: 2024-01-29
Payer: MEDICARE

## 2024-01-29 VITALS
DIASTOLIC BLOOD PRESSURE: 87 MMHG | RESPIRATION RATE: 14 BRPM | SYSTOLIC BLOOD PRESSURE: 130 MMHG | HEART RATE: 73 BPM | OXYGEN SATURATION: 100 %

## 2024-01-29 DIAGNOSIS — I48.11 LONGSTANDING PERSISTENT ATRIAL FIBRILLATION (MULTI): ICD-10-CM

## 2024-01-29 LAB
ANION GAP SERPL CALC-SCNC: 19 MMOL/L (ref 10–20)
APTT PPP: 41 SECONDS (ref 27–38)
ATRIAL RATE: 81 BPM
ATRIAL RATE: 93 BPM
BUN SERPL-MCNC: 15 MG/DL (ref 6–23)
CALCIUM SERPL-MCNC: 9.2 MG/DL (ref 8.6–10.3)
CHLORIDE SERPL-SCNC: 89 MMOL/L (ref 98–107)
CO2 SERPL-SCNC: 24 MMOL/L (ref 21–32)
CREAT SERPL-MCNC: 0.85 MG/DL (ref 0.5–1.05)
EGFRCR SERPLBLD CKD-EPI 2021: 74 ML/MIN/1.73M*2
ERYTHROCYTE [DISTWIDTH] IN BLOOD BY AUTOMATED COUNT: 12.3 % (ref 11.5–14.5)
GLUCOSE SERPL-MCNC: 54 MG/DL (ref 74–99)
HCT VFR BLD AUTO: 39.7 % (ref 36–46)
HGB BLD-MCNC: 14 G/DL (ref 12–16)
INR PPP: 1.6 (ref 0.9–1.1)
MCH RBC QN AUTO: 32.6 PG (ref 26–34)
MCHC RBC AUTO-ENTMCNC: 35.3 G/DL (ref 32–36)
MCV RBC AUTO: 93 FL (ref 80–100)
NRBC BLD-RTO: 0 /100 WBCS (ref 0–0)
P AXIS: 33 DEGREES
P AXIS: 78 DEGREES
P OFFSET: 130 MS
P ONSET: 74 MS
PLATELET # BLD AUTO: 335 X10*3/UL (ref 150–450)
POTASSIUM SERPL-SCNC: 4.5 MMOL/L (ref 3.5–5.3)
PR INTERVAL: 294 MS
PROTHROMBIN TIME: 17.9 SECONDS (ref 9.8–12.8)
Q ONSET: 219 MS
Q ONSET: 221 MS
QRS COUNT: 14 BEATS
QRS COUNT: 14 BEATS
QRS DURATION: 90 MS
QRS DURATION: 98 MS
QT INTERVAL: 356 MS
QT INTERVAL: 392 MS
QTC CALCULATION(BAZETT): 435 MS
QTC CALCULATION(BAZETT): 455 MS
QTC FREDERICIA: 407 MS
QTC FREDERICIA: 433 MS
R AXIS: -37 DEGREES
R AXIS: -49 DEGREES
RBC # BLD AUTO: 4.29 X10*6/UL (ref 4–5.2)
SODIUM SERPL-SCNC: 127 MMOL/L (ref 136–145)
T AXIS: -9 DEGREES
T AXIS: 11 DEGREES
T OFFSET: 397 MS
T OFFSET: 417 MS
VENTRICULAR RATE: 81 BPM
VENTRICULAR RATE: 90 BPM
WBC # BLD AUTO: 8.2 X10*3/UL (ref 4.4–11.3)

## 2024-01-29 PROCEDURE — 3700000002 HC GENERAL ANESTHESIA TIME - EACH INCREMENTAL 1 MINUTE: Performed by: INTERNAL MEDICINE

## 2024-01-29 PROCEDURE — 2500000004 HC RX 250 GENERAL PHARMACY W/ HCPCS (ALT 636 FOR OP/ED): Performed by: NURSE PRACTITIONER

## 2024-01-29 PROCEDURE — 80048 BASIC METABOLIC PNL TOTAL CA: CPT | Performed by: NURSE PRACTITIONER

## 2024-01-29 PROCEDURE — 7100000010 HC PHASE TWO TIME - EACH INCREMENTAL 1 MINUTE: Performed by: INTERNAL MEDICINE

## 2024-01-29 PROCEDURE — 36415 COLL VENOUS BLD VENIPUNCTURE: CPT | Performed by: NURSE PRACTITIONER

## 2024-01-29 PROCEDURE — 85610 PROTHROMBIN TIME: CPT | Performed by: NURSE PRACTITIONER

## 2024-01-29 PROCEDURE — 93005 ELECTROCARDIOGRAM TRACING: CPT | Mod: 59

## 2024-01-29 PROCEDURE — 92960 CARDIOVERSION ELECTRIC EXT: CPT | Mod: 59 | Performed by: INTERNAL MEDICINE

## 2024-01-29 PROCEDURE — 2500000004 HC RX 250 GENERAL PHARMACY W/ HCPCS (ALT 636 FOR OP/ED): Performed by: NURSE ANESTHETIST, CERTIFIED REGISTERED

## 2024-01-29 PROCEDURE — 85027 COMPLETE CBC AUTOMATED: CPT | Performed by: NURSE PRACTITIONER

## 2024-01-29 PROCEDURE — 2500000005 HC RX 250 GENERAL PHARMACY W/O HCPCS: Performed by: INTERNAL MEDICINE

## 2024-01-29 PROCEDURE — 92960 CARDIOVERSION ELECTRIC EXT: CPT | Performed by: INTERNAL MEDICINE

## 2024-01-29 PROCEDURE — 3700000001 HC GENERAL ANESTHESIA TIME - INITIAL BASE CHARGE: Performed by: INTERNAL MEDICINE

## 2024-01-29 PROCEDURE — 7100000009 HC PHASE TWO TIME - INITIAL BASE CHARGE: Performed by: INTERNAL MEDICINE

## 2024-01-29 RX ORDER — PROPOFOL 10 MG/ML
INJECTION, EMULSION INTRAVENOUS AS NEEDED
Status: DISCONTINUED | OUTPATIENT
Start: 2024-01-29 | End: 2024-01-29

## 2024-01-29 RX ORDER — SODIUM CHLORIDE 9 MG/ML
20 INJECTION, SOLUTION INTRAVENOUS CONTINUOUS
Status: DISCONTINUED | OUTPATIENT
Start: 2024-01-29 | End: 2024-01-30 | Stop reason: HOSPADM

## 2024-01-29 RX ORDER — FENTANYL CITRATE 50 UG/ML
INJECTION, SOLUTION INTRAMUSCULAR; INTRAVENOUS CONTINUOUS PRN
Status: DISCONTINUED | OUTPATIENT
Start: 2024-01-29 | End: 2024-01-29

## 2024-01-29 RX ADMIN — Medication 3 L/MIN: at 12:34

## 2024-01-29 RX ADMIN — SODIUM CHLORIDE: 9 INJECTION, SOLUTION INTRAVENOUS at 12:59

## 2024-01-29 RX ADMIN — PROPOFOL 30 MG: 10 INJECTION, EMULSION INTRAVENOUS at 12:54

## 2024-01-29 RX ADMIN — PROPOFOL 20 MG: 10 INJECTION, EMULSION INTRAVENOUS at 12:53

## 2024-01-29 RX ADMIN — PROPOFOL 50 MG: 10 INJECTION, EMULSION INTRAVENOUS at 12:52

## 2024-01-29 SDOH — HEALTH STABILITY: MENTAL HEALTH: CURRENT SMOKER: 0

## 2024-01-29 ASSESSMENT — COLUMBIA-SUICIDE SEVERITY RATING SCALE - C-SSRS
1. IN THE PAST MONTH, HAVE YOU WISHED YOU WERE DEAD OR WISHED YOU COULD GO TO SLEEP AND NOT WAKE UP?: NO
6. HAVE YOU EVER DONE ANYTHING, STARTED TO DO ANYTHING, OR PREPARED TO DO ANYTHING TO END YOUR LIFE?: NO
6. HAVE YOU EVER DONE ANYTHING, STARTED TO DO ANYTHING, OR PREPARED TO DO ANYTHING TO END YOUR LIFE?: NO
2. HAVE YOU ACTUALLY HAD ANY THOUGHTS OF KILLING YOURSELF?: NO
2. HAVE YOU ACTUALLY HAD ANY THOUGHTS OF KILLING YOURSELF?: NO
1. IN THE PAST MONTH, HAVE YOU WISHED YOU WERE DEAD OR WISHED YOU COULD GO TO SLEEP AND NOT WAKE UP?: NO
2. HAVE YOU ACTUALLY HAD ANY THOUGHTS OF KILLING YOURSELF?: NO
6. HAVE YOU EVER DONE ANYTHING, STARTED TO DO ANYTHING, OR PREPARED TO DO ANYTHING TO END YOUR LIFE?: NO

## 2024-01-29 ASSESSMENT — PAIN SCALES - GENERAL: PAIN_LEVEL: 0

## 2024-01-29 NOTE — POST-PROCEDURE NOTE
Physician Transition of Care Summary  Invasive Cardiovascular Lab    Procedure Date: 01/29/24     Pre Procedure Indications:   Persistent atrial fibrillation    Post Procedure Diagnosis:   Normal sinus rhythm    Procedure(s):   Direct current cardioversion    Procedure Findings:   After obtaining written informed consent, patient was brought to the Cath Lab in a fasting state.  Propofol was administered by anesthesiologist and paddles were placed in an AP fashion.  The patient subsequently had synchronized biphasic DC shock applied at 200 J with restoration of normal sinus rhythm.  The patient tolerated the procedure well with no complications.      Description of the Procedure:   DC cardioversion    Complications:   None    Estimated Blood Loss:   None    Anesthesia: Conscious sedation     Any Specimen(s) Removed: None      Electronically signed by: Rufus Tidwell MD, 1/29/2024

## 2024-01-29 NOTE — DISCHARGE INSTRUCTIONS
CARDIOVERSION DISCHARGE INSTRUCTIONS    FOR SUDDEN AND SEVERE CHEST PAIN, SHORTNESS OF BREATH, SIGNS OF STROKE OR CHANGES IN MENTAL STATUS YOU SHOULD CALL 911 IMMEDIATELY.    FOR NEXT 24 HOURS  - Upon discharge, you should return home and rest for the remainder of the day and evening. You do not have to stay on bed rest but should not be very active.  It is recommended a responsible adult be with you for the first 24 hours after the procedure.    - No driving for 24 hours after procedure.  Please arrange for someone to drive you home from the hospital today.  No driving until your follow-up appointment with your provider if you have had a passing out spell in the recent past or previously restricted from driving.     - Do not drive, operate machinery, or use power tools for 24 hours after your procedure.     - Do not make any legal decisions for 24 hours after your procedure.     - Do not drink alcoholic beverages for 24 hours after your procedure.

## 2024-01-29 NOTE — ANESTHESIA POSTPROCEDURE EVALUATION
"Patient: Delaney Ch \"Pegg\"    Procedure Summary       Date: 01/29/24 Room / Location: [facility]    Anesthesia Start: 1248 Anesthesia Stop: 1300    Procedure: CARDIOVERSION EXTERNAL Diagnosis: Longstanding persistent atrial fibrillation (CMS/HCC)    Scheduled Providers: Rufus Tidwell MD Responsible Provider: Jesus Yip DO    Anesthesia Type: MAC ASA Status: 3            Anesthesia Type: MAC    Vitals Value Taken Time   /70 01/29/24 1300   Temp 36 01/29/24 1300   Pulse 79 01/29/24 1300   Resp 16 01/29/24 1300   SpO2 98 01/29/24 1300       Anesthesia Post Evaluation    Patient location during evaluation: PACU  Patient participation: complete - patient participated  Level of consciousness: awake and awake and alert  Pain score: 0  Pain management: adequate  Airway patency: patent  Cardiovascular status: acceptable, hemodynamically stable, blood pressure returned to baseline and stable  Respiratory status: acceptable, nonlabored ventilation, spontaneous ventilation and room air  Hydration status: acceptable  Postoperative Nausea and Vomiting: none        There were no known notable events for this encounter.  //  "

## 2024-01-29 NOTE — ANESTHESIA PREPROCEDURE EVALUATION
"Patient: Delaney Ch \"Pegg\"    Procedure Information       Date/Time: 01/29/24 1200    Scheduled providers: Rufus Tidwell MD    Procedure: CARDIOVERSION EXTERNAL    Location: [facility]            Relevant Problems   Cardiovascular   (+) Atrial fibrillation, controlled (CMS/HCC)   (+) Hyperlipidemia, mixed   (+) Longstanding persistent atrial fibrillation (CMS/HCC)   (+) PAC (premature atrial contraction)      Endocrine   (+) Hyponatremia   (+) Hypothyroidism, adult      Neuro/Psych   (+) Anxiety      Musculoskeletal   (+) Lumbar spondylosis   (+) Scoliosis   (+) Spinal stenosis of lumbar region       Clinical information reviewed:   Tobacco  Allergies  Meds   Med Hx  Surg Hx   Fam Hx  Soc Hx        NPO Detail:  No data recorded     Physical Exam    Airway  Mallampati: II  TM distance: >3 FB  Neck ROM: full     Cardiovascular    Dental - normal exam     Pulmonary    Abdominal        Anesthesia Plan    History of general anesthesia?: yes  History of complications of general anesthesia?: no    ASA 3     MAC     The patient is not a current smoker.    intravenous induction   Anesthetic plan and risks discussed with patient.    Plan discussed with CRNA.  "

## 2024-02-15 ENCOUNTER — OFFICE VISIT (OUTPATIENT)
Dept: CARDIOLOGY | Facility: CLINIC | Age: 69
End: 2024-02-15
Payer: MEDICARE

## 2024-02-15 VITALS
SYSTOLIC BLOOD PRESSURE: 112 MMHG | WEIGHT: 142 LBS | BODY MASS INDEX: 24.24 KG/M2 | HEIGHT: 64 IN | DIASTOLIC BLOOD PRESSURE: 62 MMHG | HEART RATE: 76 BPM

## 2024-02-15 DIAGNOSIS — E78.2 HYPERLIPIDEMIA, MIXED: ICD-10-CM

## 2024-02-15 DIAGNOSIS — I48.11 LONGSTANDING PERSISTENT ATRIAL FIBRILLATION (MULTI): ICD-10-CM

## 2024-02-15 DIAGNOSIS — I48.91 ATRIAL FIBRILLATION, CONTROLLED (MULTI): ICD-10-CM

## 2024-02-15 DIAGNOSIS — E03.9 HYPOTHYROIDISM, ADULT: Primary | ICD-10-CM

## 2024-02-15 DIAGNOSIS — R55 SYNCOPE AND COLLAPSE: ICD-10-CM

## 2024-02-15 PROCEDURE — 1157F ADVNC CARE PLAN IN RCRD: CPT | Performed by: NURSE PRACTITIONER

## 2024-02-15 PROCEDURE — 3008F BODY MASS INDEX DOCD: CPT | Performed by: NURSE PRACTITIONER

## 2024-02-15 PROCEDURE — 1036F TOBACCO NON-USER: CPT | Performed by: NURSE PRACTITIONER

## 2024-02-15 PROCEDURE — 1160F RVW MEDS BY RX/DR IN RCRD: CPT | Performed by: NURSE PRACTITIONER

## 2024-02-15 PROCEDURE — 1159F MED LIST DOCD IN RCRD: CPT | Performed by: NURSE PRACTITIONER

## 2024-02-15 PROCEDURE — 99214 OFFICE O/P EST MOD 30 MIN: CPT | Performed by: NURSE PRACTITIONER

## 2024-02-15 RX ORDER — FLECAINIDE ACETATE 100 MG/1
100 TABLET ORAL 2 TIMES DAILY
Qty: 60 TABLET | Refills: 11 | Status: SHIPPED | OUTPATIENT
Start: 2024-02-15 | End: 2025-02-14

## 2024-02-15 RX ORDER — LEVOTHYROXINE SODIUM 75 UG/1
75 TABLET ORAL
Qty: 90 TABLET | Refills: 3 | Status: SHIPPED | OUTPATIENT
Start: 2024-02-15 | End: 2025-02-14

## 2024-02-15 NOTE — PROGRESS NOTES
"CARDIOLOGY OFFICE VISIT      CHIEF COMPLAINT  Chief Complaint   Patient presents with    Hospital Follow-up     2 week DC from [facility] 1/29 Cardioversion.     Chief complaint: \"I am going in and out of rhythm.\"  HISTORY OF PRESENT ILLNESS  HPI  History: The patient is a 68-year-old  female who is followed for paroxysmal atrial fibrillation on flecainide and anticoagulated with Eliquis.  She failed a previous trial of propafenone.  Additionally she is followed for hypertension, hypothyroidism on replacement therapy, and anxiety disorder.  On January 17, 2024 the flecainide was increased from 50 mg twice a day to 75 mg twice a day for persistent atrial fibrillation ongoing since January 1, 2024.  Patient underwent an outpatient cardioversion on January 29, 2024 with restoration of sinus rhythm.  She presents to the office today for follow-up evaluation.  She states she believes she is going in and out of rhythm.  She notes that her heart rates in the 120s typically in the morning hours.  She denies chest pain, dizziness, lightheadedness, shortness of breath, or abdominal distention.  She states she just got out of her boot and is experiencing left lower extremity swelling.  Past Medical History  Past Medical History:   Diagnosis Date    A-fib (CMS/Formerly [facility])     Abnormal screening cardiac CT     6/20:1. Coronary artery calcium score of 137*. (LAD, LCX)     2. 77th percentile** for age, gender in asymptomatic patients.     *Coronary Artery  Agatston score    Arrhythmia     H/O bone density study     11/6/20: -2.3(radius), -1.5 (femur)     10/24/17: -1.3     3/21/13: THE LOWEST T-SCORE IS -1.8      1) DIAGNOSIS (based on BMD alone): OSTEOPENIA    H/O bone density study 10/20/2023    Right Forearm  Bone Density: 0.643 g/cm2 . . T Score: -2.8, Femur -1.4    H/O chest x-ray     5/21: normal    H/O CT scan of abdomen     5/2021: .  Colonic diverticulosis. Findings consistent with acute diverticulitis involving the     " distal descending colon. No evidence of bowel obstruction, free intraperitoneal air or     abnormal intra-abdominal fluid collection.    H/O diagnostic ultrasound     7/20: US kidney: normal    H/O echocardiogram     6/21: CONCLUSIONS:     1. The left ventricular systolic function is normal with a 60-65% estimated ejection     fraction.     2. Spectral Doppler shows an impaired relaxation pattern of left ventricular diastolic filling     Mild MR/TR    History of Holter monitoring     1/19: Underlying AFIB with RVR     5/21: NSR with occ isolated PVCs (when had sx)    History of MRI of lumbar spine     3/13: Multilevel degenerative changes with various degrees of canal and foraminal narrowing seen throughout the lumbar spine. Findings are most significant at the L3 -- L4 level where there is severe canal stenosis, right subarticular recess narrowing, and moderate to severe right foraminal narrowing.Mod-sev levoconvex scoliosis. Subtle edema involving the upper S2 vertebra, perhaps rep insuff fx.    History of nuclear stress test     9/12: normal    Hypertension     Hypothyroid     Raynaud's disease     Syncope and collapse        Social History  Social History     Tobacco Use    Smoking status: Never    Smokeless tobacco: Never   Substance Use Topics    Alcohol use: Yes     Comment: socially    Drug use: Never       Family History     Family History   Problem Relation Name Age of Onset    Osteoporotic fracture Mother          see social doc    Other (carotid artery stenosis) Father      Coronary artery disease Father      Other (coronary artery bypass graft) Father      Peripheral vascular disease Father      Atrial fibrillation Other          Allergies:  Allergies   Allergen Reactions    Demerol [Meperidine] Anaphylaxis    Opioids-Meperidine And Related Anaphylaxis     ANAPHYLAXSIS    Alendronate Unknown    Cysteine Other     sores on legs    Lisinopril Dizziness     vertigo    Nifedipine Unknown     cold     Rivaroxaban Other     cold    Rosuvastatin Unknown        Outpatient Medications:  Current Outpatient Medications   Medication Instructions    ALPRAZolam (XANAX) 0.5 mg, oral, 3 times daily PRN    calcium carbonate (Tums) 200 mg calcium chewable tablet 1 tablet, oral, Daily    cholecalciferol (Vitamin D-3) 5,000 Units tablet 1 tablet, oral, Daily, 1 tablet daily    dilTIAZem (CARDIZEM) 30 mg, oral, 2 times daily, As needed for palpitations    Eliquis 5 mg, oral, 2 times daily    ferrous sulfate (325 mg ferrous sulfate) 325 mg, oral, 2 times weekly    levothyroxine (SYNTHROID, LEVOXYL) 75 mcg, oral, Daily before breakfast    multivitamin tablet 1 tablet, oral, Daily    omega 3-dha-epa-fish oil (Fish OiL) 1,000 mg (120 mg-180 mg) capsule 1 capsule, oral, Daily    Prolia 60 mg, subcutaneous, Every 6 months    red yeast rice 600 mg capsule 1 capsule, oral, Daily    vitamins A,C,E-zinc-copper (PreserVision AREDS) 2,148 mcg-113 mg-45 mg-17.4mg tablet 1 tablet, oral, Daily          REVIEW OF SYSTEMS  Review of Systems   All other systems reviewed and are negative.        VITALS  Vitals:    02/15/24 0817   BP: 112/62   Pulse: 76       PHYSICAL EXAM  Vitals and nursing note reviewed.   Constitutional:       Appearance: Normal appearance.   HENT:      Head: Normocephalic.   Neck:      Vascular: No JVD.   Cardiovascular:      Rate and Rhythm: Normal rate and regular rhythm.      Pulses: Normal pulses.      Heart sounds: Normal heart sounds.      1-2+ left lower extremity swelling.  Pulmonary:      Effort: Pulmonary effort is normal.      Breath sounds: Normal breath sounds.   Abdominal:      General: Bowel sounds are normal.      Palpations: Abdomen is soft.   Musculoskeletal:         General: Normal range of motion.      Cervical back: Normal range of motion.   Skin:     General: Skin is warm and dry.   Neurological:      General: No focal deficit present.      Mental Status: She is alert and oriented to person, place, and  time.      Motor: Motor function is intact.   Psychiatric:         Attention and Perception: Attention and perception normal.         Mood and Affect: Mood and affect normal.         Speech: Speech normal.         Behavior: Behavior normal. Behavior is cooperative.         Thought Content: Thought content normal.         Cognition and Memory: Cognition and memory normal.     Labs and testing: Twelve-lead EKG reveals sinus rhythm with first-degree AV block, OH intervals 222 ms.  QRS durations 90 ms,  ms, QTc 438 ms.  No acute ischemic changes or infarct patterns are noted.      ASSESSMENT AND PLAN    Clinical impressions:  1. Paroxysmal atrial fibrillation on flecainide and anticoagulated with Eliquis.  Failure of propafenone to control the arrhythmia in the past.  2. Macular rash over the anterior and posterior torso and all 4 extremities.  Resolved  3. Hypertension, controlled with a blood pressure today 112/62.  4. Hypothyroidism on replacement therapy.  5. Anxiety utilizing Xanax as needed.  6. Overweight with a BMI of 24.37.      7.  Vertigo.      8.  Left ankle arthrotomy, partial tibial excision, and joint injection on July 14, 2023.     Recommendations:  1.  Patient was instructed to increase the flecainide from 75 mg twice a day to 100 mg twice a day for arrhythmia suppression.  She will return to the office on Monday, February 19, 2024 for twelve-lead EKG for evaluation of QRS duration.  Further recommendations will be pending those results.  Patient will remain on magnesium oxide.  Lifestyle modifications were discussed.  Patient states she is compliant.  2.  Continue all other medications as prescribed.  3.  Follow-up in office with Dr. Sequeira on May 10, 2024 at 9 AM as scheduled or sooner if needed.    Evaluation and note by Luz Anderson CNP  **Please excuse any errors in grammar or translation related to this dictation.  Voice recognition software was utilized to prepare this document.**

## 2024-02-19 ENCOUNTER — ANCILLARY PROCEDURE (OUTPATIENT)
Dept: CARDIOLOGY | Facility: CLINIC | Age: 69
End: 2024-02-19
Payer: MEDICARE

## 2024-02-19 DIAGNOSIS — I48.91 ATRIAL FIBRILLATION, CONTROLLED (MULTI): ICD-10-CM

## 2024-02-19 NOTE — PROGRESS NOTES
Patient came into the office for an EKG due to medication changes. Patient reports that she is dizzy, has nausea, and is SOB with the medication increase.   EKG reviewed by Luz Anderson CNP,  she states that all measurements are good. Patient can decrease the Flecainide to 75mg in the morning and remain on the 100mg in the evening.   If patient continues to have problems she was instructed to call Luz Anderson CNP   Patient verbalized understanding

## 2024-02-26 ENCOUNTER — TELEPHONE (OUTPATIENT)
Dept: CARDIOLOGY | Facility: CLINIC | Age: 69
End: 2024-02-26
Payer: MEDICARE

## 2024-02-26 NOTE — TELEPHONE ENCOUNTER
Patient called the office stating that she just got in from [place]. She states she is back in A-fib again. Wondering if the altitude could be the cause? She states that she is taking the Flecainide 100mg in the morning and 100mg in the evening. She states she is very short of breathe. She is hoping that it calms down in a day or so. Would like recommendations. Message forwarded to Luz Anderson CNP for review

## 2024-02-27 ENCOUNTER — TELEPHONE (OUTPATIENT)
Dept: CARDIOLOGY | Facility: CLINIC | Age: 69
End: 2024-02-27
Payer: MEDICARE

## 2024-02-27 NOTE — TELEPHONE ENCOUNTER
----- Message from SHIRLEY Sood sent at 2/26/2024  3:23 PM EST -----  Call the office on Wednesday if symptoms haven't improved and we will cardiovert again.  ----- Message -----  From: Kingston Hodges MA  Sent: 2/26/2024   1:44 PM EST  To: SHIRLEY Sood

## 2024-02-29 ENCOUNTER — APPOINTMENT (OUTPATIENT)
Dept: CARDIOLOGY | Facility: HOSPITAL | Age: 69
End: 2024-02-29
Payer: MEDICARE

## 2024-02-29 ENCOUNTER — HOSPITAL ENCOUNTER (OUTPATIENT)
Facility: HOSPITAL | Age: 69
Setting detail: OBSERVATION
Discharge: HOME | End: 2024-03-01
Attending: EMERGENCY MEDICINE | Admitting: STUDENT IN AN ORGANIZED HEALTH CARE EDUCATION/TRAINING PROGRAM
Payer: MEDICARE

## 2024-02-29 DIAGNOSIS — E87.1 HYPONATREMIA: Primary | ICD-10-CM

## 2024-02-29 DIAGNOSIS — I48.11 LONGSTANDING PERSISTENT ATRIAL FIBRILLATION (MULTI): ICD-10-CM

## 2024-02-29 DIAGNOSIS — J11.1 INFLUENZA: ICD-10-CM

## 2024-02-29 DIAGNOSIS — I48.91 ATRIAL FIBRILLATION, UNSPECIFIED TYPE (MULTI): ICD-10-CM

## 2024-02-29 PROBLEM — R60.9 EDEMA: Status: RESOLVED | Noted: 2024-02-29 | Resolved: 2024-02-29

## 2024-02-29 PROBLEM — R07.9 CHEST PAIN: Status: RESOLVED | Noted: 2024-02-29 | Resolved: 2024-02-29

## 2024-02-29 PROBLEM — M79.641 PAIN OF RIGHT HAND: Status: RESOLVED | Noted: 2023-11-14 | Resolved: 2024-02-29
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
## 2024-02-29 PROBLEM — R68.81 EARLY SATIETY: Status: RESOLVED | Noted: 2024-02-29 | Resolved: 2024-02-29

## 2024-02-29 PROBLEM — S82.899A CLOSED FRACTURE OF ANKLE: Status: ACTIVE | Noted: 2024-02-29

## 2024-02-29 PROBLEM — R31.9 HEMATURIA: Status: RESOLVED | Noted: 2024-02-29 | Resolved: 2024-02-29

## 2024-02-29 PROBLEM — R82.4 KETONURIA: Status: RESOLVED | Noted: 2024-02-29 | Resolved: 2024-02-29

## 2024-02-29 PROBLEM — S43.109A DISLOCATION OF ACROMIOCLAVICULAR JOINT: Status: RESOLVED | Noted: 2022-11-04 | Resolved: 2024-02-29

## 2024-02-29 PROBLEM — I10 PRIMARY HYPERTENSION: Status: ACTIVE | Noted: 2019-04-03

## 2024-02-29 PROBLEM — R06.02 SHORTNESS OF BREATH AT REST: Status: RESOLVED | Noted: 2024-02-29 | Resolved: 2024-02-29

## 2024-02-29 PROBLEM — S09.90XA INJURY OF HEAD: Status: RESOLVED | Noted: 2024-02-29 | Resolved: 2024-02-29

## 2024-02-29 PROBLEM — R00.2 PALPITATIONS: Status: RESOLVED | Noted: 2018-12-26 | Resolved: 2024-02-29

## 2024-02-29 PROBLEM — R26.89 POOR BALANCE OF BODY WEIGHT: Status: RESOLVED | Noted: 2024-02-29 | Resolved: 2024-02-29

## 2024-02-29 PROBLEM — R10.9 ACUTE ABDOMINAL PAIN: Status: RESOLVED | Noted: 2024-02-29 | Resolved: 2024-02-29

## 2024-02-29 PROBLEM — M25.519 SHOULDER PAIN: Status: RESOLVED | Noted: 2024-02-29 | Resolved: 2024-02-29

## 2024-02-29 PROBLEM — R11.2 NAUSEA AND VOMITING: Status: RESOLVED | Noted: 2024-02-29 | Resolved: 2024-02-29

## 2024-02-29 PROBLEM — S42.013A CLOSED FRACTURE OF STERNAL END OF CLAVICLE: Status: RESOLVED | Noted: 2022-11-04 | Resolved: 2024-02-29

## 2024-02-29 PROBLEM — M19.039 ARTHRITIS OF WRIST: Status: ACTIVE | Noted: 2023-01-20

## 2024-02-29 PROBLEM — I73.00 RAYNAUD'S DISEASE: Status: ACTIVE | Noted: 2024-02-29

## 2024-02-29 LAB
ALBUMIN SERPL BCP-MCNC: 3.8 G/DL (ref 3.4–5)
ALP SERPL-CCNC: 59 U/L (ref 33–136)
ALT SERPL W P-5'-P-CCNC: 45 U/L (ref 7–45)
ANION GAP SERPL CALC-SCNC: 13 MMOL/L (ref 10–20)
AST SERPL W P-5'-P-CCNC: 52 U/L (ref 9–39)
BASOPHILS # BLD AUTO: 0.02 X10*3/UL (ref 0–0.1)
BASOPHILS NFR BLD AUTO: 0.2 %
BILIRUB SERPL-MCNC: 0.7 MG/DL (ref 0–1.2)
BUN SERPL-MCNC: 9 MG/DL (ref 6–23)
CALCIUM SERPL-MCNC: 8.7 MG/DL (ref 8.6–10.3)
CARDIAC TROPONIN I PNL SERPL HS: 11 NG/L (ref 0–13)
CARDIAC TROPONIN I PNL SERPL HS: 11 NG/L (ref 0–13)
CHLORIDE SERPL-SCNC: 89 MMOL/L (ref 98–107)
CO2 SERPL-SCNC: 25 MMOL/L (ref 21–32)
CREAT SERPL-MCNC: 0.57 MG/DL (ref 0.5–1.05)
EGFRCR SERPLBLD CKD-EPI 2021: >90 ML/MIN/1.73M*2
EOSINOPHIL # BLD AUTO: 0 X10*3/UL (ref 0–0.7)
EOSINOPHIL NFR BLD AUTO: 0 %
ERYTHROCYTE [DISTWIDTH] IN BLOOD BY AUTOMATED COUNT: 13.5 % (ref 11.5–14.5)
FLUAV RNA RESP QL NAA+PROBE: DETECTED
FLUBV RNA RESP QL NAA+PROBE: NOT DETECTED
GLUCOSE SERPL-MCNC: 89 MG/DL (ref 74–99)
HCT VFR BLD AUTO: 38.9 % (ref 36–46)
HGB BLD-MCNC: 13.4 G/DL (ref 12–16)
HOLD SPECIMEN: NORMAL
IMM GRANULOCYTES # BLD AUTO: 0.03 X10*3/UL (ref 0–0.7)
IMM GRANULOCYTES NFR BLD AUTO: 0.4 % (ref 0–0.9)
LACTATE SERPL-SCNC: 1 MMOL/L (ref 0.4–2)
LYMPHOCYTES # BLD AUTO: 0.45 X10*3/UL (ref 1.2–4.8)
LYMPHOCYTES NFR BLD AUTO: 5.3 %
MAGNESIUM SERPL-MCNC: 1.31 MG/DL (ref 1.6–2.4)
MCH RBC QN AUTO: 31.8 PG (ref 26–34)
MCHC RBC AUTO-ENTMCNC: 34.4 G/DL (ref 32–36)
MCV RBC AUTO: 92 FL (ref 80–100)
MONOCYTES # BLD AUTO: 1.06 X10*3/UL (ref 0.1–1)
MONOCYTES NFR BLD AUTO: 12.4 %
NEUTROPHILS # BLD AUTO: 7 X10*3/UL (ref 1.2–7.7)
NEUTROPHILS NFR BLD AUTO: 81.7 %
NRBC BLD-RTO: 0 /100 WBCS (ref 0–0)
PLATELET # BLD AUTO: 240 X10*3/UL (ref 150–450)
POTASSIUM SERPL-SCNC: 3.3 MMOL/L (ref 3.5–5.3)
PROT SERPL-MCNC: 6.6 G/DL (ref 6.4–8.2)
Q ONSET: 217 MS
QRS COUNT: 19 BEATS
QRS DURATION: 86 MS
QT INTERVAL: 284 MS
QTC CALCULATION(BAZETT): 398 MS
QTC FREDERICIA: 355 MS
R AXIS: 185 DEGREES
RBC # BLD AUTO: 4.22 X10*6/UL (ref 4–5.2)
SARS-COV-2 RNA RESP QL NAA+PROBE: NOT DETECTED
SODIUM SERPL-SCNC: 124 MMOL/L (ref 136–145)
T AXIS: -22 DEGREES
T OFFSET: 359 MS
VENTRICULAR RATE: 118 BPM
WBC # BLD AUTO: 8.6 X10*3/UL (ref 4.4–11.3)

## 2024-02-29 PROCEDURE — 96365 THER/PROPH/DIAG IV INF INIT: CPT

## 2024-02-29 PROCEDURE — 87636 SARSCOV2 & INF A&B AMP PRB: CPT | Performed by: EMERGENCY MEDICINE

## 2024-02-29 PROCEDURE — 36415 COLL VENOUS BLD VENIPUNCTURE: CPT | Performed by: STUDENT IN AN ORGANIZED HEALTH CARE EDUCATION/TRAINING PROGRAM

## 2024-02-29 PROCEDURE — 93010 ELECTROCARDIOGRAM REPORT: CPT | Performed by: EMERGENCY MEDICINE

## 2024-02-29 PROCEDURE — 83605 ASSAY OF LACTIC ACID: CPT | Performed by: STUDENT IN AN ORGANIZED HEALTH CARE EDUCATION/TRAINING PROGRAM

## 2024-02-29 PROCEDURE — 2500000004 HC RX 250 GENERAL PHARMACY W/ HCPCS (ALT 636 FOR OP/ED)

## 2024-02-29 PROCEDURE — 85025 COMPLETE CBC W/AUTO DIFF WBC: CPT | Performed by: STUDENT IN AN ORGANIZED HEALTH CARE EDUCATION/TRAINING PROGRAM

## 2024-02-29 PROCEDURE — 85025 COMPLETE CBC W/AUTO DIFF WBC: CPT | Performed by: EMERGENCY MEDICINE

## 2024-02-29 PROCEDURE — 87040 BLOOD CULTURE FOR BACTERIA: CPT | Mod: STJLAB | Performed by: EMERGENCY MEDICINE

## 2024-02-29 PROCEDURE — 93005 ELECTROCARDIOGRAM TRACING: CPT

## 2024-02-29 PROCEDURE — 84484 ASSAY OF TROPONIN QUANT: CPT | Performed by: EMERGENCY MEDICINE

## 2024-02-29 PROCEDURE — 83735 ASSAY OF MAGNESIUM: CPT | Performed by: EMERGENCY MEDICINE

## 2024-02-29 PROCEDURE — 2500000002 HC RX 250 W HCPCS SELF ADMINISTERED DRUGS (ALT 637 FOR MEDICARE OP, ALT 636 FOR OP/ED)

## 2024-02-29 PROCEDURE — 99285 EMERGENCY DEPT VISIT HI MDM: CPT | Performed by: EMERGENCY MEDICINE

## 2024-02-29 PROCEDURE — 99285 EMERGENCY DEPT VISIT HI MDM: CPT | Mod: 25

## 2024-02-29 PROCEDURE — 2500000001 HC RX 250 WO HCPCS SELF ADMINISTERED DRUGS (ALT 637 FOR MEDICARE OP): Performed by: STUDENT IN AN ORGANIZED HEALTH CARE EDUCATION/TRAINING PROGRAM

## 2024-02-29 PROCEDURE — 80053 COMPREHEN METABOLIC PANEL: CPT | Performed by: STUDENT IN AN ORGANIZED HEALTH CARE EDUCATION/TRAINING PROGRAM

## 2024-02-29 PROCEDURE — 83605 ASSAY OF LACTIC ACID: CPT | Performed by: EMERGENCY MEDICINE

## 2024-02-29 PROCEDURE — 87636 SARSCOV2 & INF A&B AMP PRB: CPT | Performed by: STUDENT IN AN ORGANIZED HEALTH CARE EDUCATION/TRAINING PROGRAM

## 2024-02-29 PROCEDURE — 99222 1ST HOSP IP/OBS MODERATE 55: CPT | Performed by: STUDENT IN AN ORGANIZED HEALTH CARE EDUCATION/TRAINING PROGRAM

## 2024-02-29 PROCEDURE — 96366 THER/PROPH/DIAG IV INF ADDON: CPT

## 2024-02-29 PROCEDURE — 80053 COMPREHEN METABOLIC PANEL: CPT | Performed by: EMERGENCY MEDICINE

## 2024-02-29 PROCEDURE — 84484 ASSAY OF TROPONIN QUANT: CPT | Performed by: STUDENT IN AN ORGANIZED HEALTH CARE EDUCATION/TRAINING PROGRAM

## 2024-02-29 RX ORDER — DILTIAZEM HYDROCHLORIDE 30 MG/1
30 TABLET, FILM COATED ORAL EVERY 12 HOURS
Status: DISCONTINUED | OUTPATIENT
Start: 2024-02-29 | End: 2024-02-29

## 2024-02-29 RX ORDER — POTASSIUM CHLORIDE 20 MEQ/1
40 TABLET, EXTENDED RELEASE ORAL ONCE
Status: COMPLETED | OUTPATIENT
Start: 2024-02-29 | End: 2024-02-29

## 2024-02-29 RX ORDER — FLECAINIDE ACETATE 100 MG/1
100 TABLET ORAL EVERY 12 HOURS SCHEDULED
Status: DISCONTINUED | OUTPATIENT
Start: 2024-02-29 | End: 2024-03-01 | Stop reason: HOSPADM

## 2024-02-29 RX ORDER — LEVOTHYROXINE SODIUM 75 UG/1
75 TABLET ORAL DAILY
Status: DISCONTINUED | OUTPATIENT
Start: 2024-03-01 | End: 2024-03-01

## 2024-02-29 RX ORDER — ACETAMINOPHEN 325 MG/1
650 TABLET ORAL ONCE
Status: COMPLETED | OUTPATIENT
Start: 2024-02-29 | End: 2024-02-29

## 2024-02-29 RX ORDER — MAGNESIUM SULFATE HEPTAHYDRATE 40 MG/ML
2 INJECTION, SOLUTION INTRAVENOUS ONCE
Status: COMPLETED | OUTPATIENT
Start: 2024-02-29 | End: 2024-02-29

## 2024-02-29 RX ADMIN — SODIUM CHLORIDE 1000 ML: 9 INJECTION, SOLUTION INTRAVENOUS at 18:18

## 2024-02-29 RX ADMIN — FLECAINIDE ACETATE 100 MG: 100 TABLET ORAL at 22:29

## 2024-02-29 RX ADMIN — APIXABAN 5 MG: 5 TABLET, FILM COATED ORAL at 22:28

## 2024-02-29 RX ADMIN — MAGNESIUM SULFATE HEPTAHYDRATE 2 G: 40 INJECTION, SOLUTION INTRAVENOUS at 18:16

## 2024-02-29 RX ADMIN — ACETAMINOPHEN 650 MG: 325 TABLET ORAL at 18:11

## 2024-02-29 RX ADMIN — POTASSIUM CHLORIDE 40 MEQ: 1500 TABLET, EXTENDED RELEASE ORAL at 18:11

## 2024-02-29 ASSESSMENT — PAIN - FUNCTIONAL ASSESSMENT: PAIN_FUNCTIONAL_ASSESSMENT: 0-10

## 2024-02-29 ASSESSMENT — LIFESTYLE VARIABLES
EVER FELT BAD OR GUILTY ABOUT YOUR DRINKING: NO
HAVE PEOPLE ANNOYED YOU BY CRITICIZING YOUR DRINKING: NO
HAVE YOU EVER FELT YOU SHOULD CUT DOWN ON YOUR DRINKING: NO
EVER HAD A DRINK FIRST THING IN THE MORNING TO STEADY YOUR NERVES TO GET RID OF A HANGOVER: NO

## 2024-02-29 ASSESSMENT — COLUMBIA-SUICIDE SEVERITY RATING SCALE - C-SSRS
6. HAVE YOU EVER DONE ANYTHING, STARTED TO DO ANYTHING, OR PREPARED TO DO ANYTHING TO END YOUR LIFE?: NO
2. HAVE YOU ACTUALLY HAD ANY THOUGHTS OF KILLING YOURSELF?: NO
1. IN THE PAST MONTH, HAVE YOU WISHED YOU WERE DEAD OR WISHED YOU COULD GO TO SLEEP AND NOT WAKE UP?: NO

## 2024-02-29 ASSESSMENT — PAIN SCALES - GENERAL: PAINLEVEL_OUTOF10: 0 - NO PAIN

## 2024-02-29 NOTE — ED PROVIDER NOTES
"EMERGENCY DEPARTMENT ENCOUNTER      Pt Name: Delaney Ch  MRN: [id number]  Birthdate 1955  Date of evaluation: 2/29/2024  Provider: Suhas Soto DO    CHIEF COMPLAINT       Chief Complaint   Patient presents with    Flu Symptoms     Patient also states she has pain all over like the \"flu\" and also has a-fib         HISTORY OF PRESENT ILLNESS    Delaney, who goes by \"Pegg\", is presenting for weakness,, headache, body aches, as well as being in A-fib for the past 2 weeks.  Patient states that for the past 2 weeks she has been in A-fib and progressively getting weaker with worse body aches.  She states 2 weeks ago that she was on the plane back form [place] and she sat next to a man on the plane who was coughing. Currently endorsing headache, bodyaches, and confusion/brain fog. She then developed cough, body aches, and went in to Afib. She has been getting progressively weaker since then. She states that she also has a left ankle fracture, fractures 6 months ago, that she was supposed to see orthopedics about on Tuesday, but she canceled the appointment due to feeling to weak to walk. Currently endorsing headache, dizziness, brain fog, and shortness of breath with exertion. Denies nausea, vomintg, diarrhea prior to presentation. States that her fluid and food intake has been normal the past two weeks. She had a recent hospital stay in January to be cardioverted. She is on Cardizem, flecainide, and eliquis. Cardiologists are Louie and Antoni.          Nursing Notes were reviewed.    PAST MEDICAL HISTORY     Past Medical History:   Diagnosis Date    A-fib (CMS/[facility])     Abnormal screening cardiac CT     6/20:1. Coronary artery calcium score of 137*. (LAD, LCX)     2. 77th percentile** for age, gender in asymptomatic patients.     *Coronary Artery  Agatston score    Arrhythmia     H/O bone density study     11/6/20: -2.3(radius), -1.5 (femur)     10/24/17: -1.3     3/21/13: THE LOWEST T-SCORE IS -1.8      1) " DIAGNOSIS (based on BMD alone): OSTEOPENIA    H/O bone density study 10/20/2023    Right Forearm  Bone Density: 0.643 g/cm2 . . T Score: -2.8, Femur -1.4    H/O chest x-ray     5/21: normal    H/O CT scan of abdomen     5/2021: .  Colonic diverticulosis. Findings consistent with acute diverticulitis involving the     distal descending colon. No evidence of bowel obstruction, free intraperitoneal air or     abnormal intra-abdominal fluid collection.    H/O diagnostic ultrasound     7/20: US kidney: normal    H/O echocardiogram     6/21: CONCLUSIONS:     1. The left ventricular systolic function is normal with a 60-65% estimated ejection     fraction.     2. Spectral Doppler shows an impaired relaxation pattern of left ventricular diastolic filling     Mild MR/TR    History of Holter monitoring     1/19: Underlying AFIB with RVR     5/21: NSR with occ isolated PVCs (when had sx)    History of MRI of lumbar spine     3/13: Multilevel degenerative changes with various degrees of canal and foraminal narrowing seen throughout the lumbar spine. Findings are most significant at the L3 -- L4 level where there is severe canal stenosis, right subarticular recess narrowing, and moderate to severe right foraminal narrowing.Mod-sev levoconvex scoliosis. Subtle edema involving the upper S2 vertebra, perhaps rep insuff fx.    History of nuclear stress test     9/12: normal    Hypertension     Hypothyroid     Raynaud's disease     Syncope and collapse          SURGICAL HISTORY       Past Surgical History:   Procedure Laterality Date    ANKLE SURGERY  07/14/2023    1. LEFT ANKLE ARTHROTOMY WITH SYNEVECTOMY/ LET PARTIAL TIBIA EXCISION/ LEFT SUBTALAR JOINT INJECTION WITH C-ARM    BACK SURGERY      L2-S1 discectomy and fusio    BELT ABDOMINOPLASTY      Abdominoplasty    BUNIONECTOMY      Bunionectomy    CARDIAC CATHETERIZATION  02/2022    Cardiac catheterization    CARDIOVERSION  02/2022    Cardioversion    CATARACT EXTRACTION       Cataract surgery    COLONOSCOPY  12/2019 12/19: diverticulosis, 2mm polyp (TA)     7/19/06: Impression:Hyperplastic polyp sigmoid-removed.  Normal rectum. Normal      cecum. Normal ascending colon and ileocecal valve. A single diminutive      polyp found in the sigmoid; removed by cold biopsy polypectomy.    ESOPHAGOGASTRODUODENOSCOPY  10/08/2021    10/8/21: - Normal examined duodenum. - Erythematous mucosa in the antrum. Biopsied.- 3 cm hiatal hernia.    HIP SURGERY      Right DEBORAH x2    TIBIA FRACTURE SURGERY      left tib/fib ORIF    TOTAL KNEE ARTHROPLASTY      3/21, 9/20 (bilateral)    TUBAL LIGATION      Tubal ligation    WRIST SURGERY      Wrist surgery         CURRENT MEDICATIONS       Previous Medications    ALPRAZOLAM (XANAX) 0.5 MG TABLET    Take 1 tablet (0.5 mg) by mouth 3 times a day as needed for anxiety for up to 7 days.    CALCIUM CARBONATE (TUMS) 200 MG CALCIUM CHEWABLE TABLET    Chew 1 tablet (500 mg) once daily.    CHOLECALCIFEROL (VITAMIN D-3) 5,000 UNITS TABLET    Take 1 tablet (5,000 Units) by mouth once daily. 1 tablet daily    DILTIAZEM (CARDIZEM) 30 MG IMMEDIATE RELEASE TABLET    Take 1 tablet (30 mg) by mouth 2 times a day. As needed for palpitations    ELIQUIS 5 MG TABLET    Take 1 tablet (5 mg) by mouth 2 times a day.    FERROUS SULFATE 325 (65 FE) MG TABLET    Take 1 tablet by mouth 2 times a week.    FLECAINIDE (TAMBOCOR) 100 MG TABLET    Take 1 tablet (100 mg) by mouth 2 times a day.    LEVOTHYROXINE (SYNTHROID, LEVOXYL) 75 MCG TABLET    Take 1 tablet (75 mcg) by mouth once daily in the morning. Take before meals.    MULTIVITAMIN TABLET    Take 1 tablet by mouth once daily.    OMEGA 3-DHA-EPA-FISH OIL (FISH OIL) 1,000 MG (120 MG-180 MG) CAPSULE    Take 1 capsule (1,000 mg) by mouth once daily.    PROLIA 60 MG/ML SYRINGE    Inject 1 mL (60 mg total) under the skin every 6 months.    RED YEAST RICE 600 MG CAPSULE    Take 1 capsule by mouth once daily.    VITAMINS A,C,E-ZINC-COPPER  (PRESERVISION AREDS) 2,148 MCG-113 MG-45 MG-17.4MG TABLET    Take 1 tablet by mouth once daily.       ALLERGIES     Demerol [meperidine], Opioids-meperidine and related, Alendronate, Cysteine, Lisinopril, Nifedipine, Rivaroxaban, and Rosuvastatin    FAMILY HISTORY       Family History   Problem Relation Name Age of Onset    Osteoporotic fracture Mother          see social doc    Other (carotid artery stenosis) Father      Coronary artery disease Father      Other (coronary artery bypass graft) Father      Peripheral vascular disease Father      Atrial fibrillation Other            SOCIAL HISTORY       Social History     Socioeconomic History    Marital status:      Spouse name: Not on file    Number of children: Not on file    Years of education: Not on file    Highest education level: Not on file   Occupational History    Not on file   Tobacco Use    Smoking status: Never    Smokeless tobacco: Never   Substance and Sexual Activity    Alcohol use: Yes     Comment: socially    Drug use: Never    Sexual activity: Defer   Other Topics Concern    Not on file   Social History Narrative    Family History:        F:  Hypertension, CAD, COPD, AFIB ( age 86)        M:  OP/fractured femur (Age 90 in )        B:  AFIB        S:  Syncope        Social History:     but has SO    (mom lives with her)     2 kids     Works/[employer] [address]    Nonsmoker    ETOH socially     Social Determinants of Health     Financial Resource Strain: Not on file   Food Insecurity: Not on file   Transportation Needs: Not on file   Physical Activity: Not on file   Stress: Not on file   Social Connections: Not on file   Intimate Partner Violence: Not on file   Housing Stability: Not on file       SCREENINGS                        PHYSICAL EXAM    (up to 7 for level 4, 8 or more for level 5)     ED Triage Vitals [24 1415]   Temperature Heart Rate Respirations BP   (!) 39.2 °C (102.6 °F) (!) 127 (!) 22 142/85      Pulse  Ox Temp Source Heart Rate Source Patient Position   95 % Temporal -- Sitting      BP Location FiO2 (%)     Right arm --       Physical Exam  Constitutional:       General: She is not in acute distress.     Appearance: She is not toxic-appearing or diaphoretic.   HENT:      Mouth/Throat:      Pharynx: Oropharynx is clear.      Comments: Lips dry, oral mucosa moist    Eyes:      Extraocular Movements: Extraocular movements intact.      Pupils: Pupils are equal, round, and reactive to light.   Cardiovascular:      Rate and Rhythm: Tachycardia present. Rhythm irregular.      Pulses: Normal pulses.   Pulmonary:      Effort: Pulmonary effort is normal. No respiratory distress.      Breath sounds: No wheezing, rhonchi or rales.   Abdominal:      General: Abdomen is flat. There is no distension.      Palpations: Abdomen is soft.      Tenderness: There is no abdominal tenderness. There is no guarding.   Musculoskeletal:         General: Tenderness (Left ankle medial malleolus) present.      Right lower leg: No edema.      Left lower leg: No edema.   Skin:     General: Skin is warm and dry.      Capillary Refill: Capillary refill takes less than 2 seconds.   Neurological:      General: No focal deficit present.      Mental Status: She is alert and oriented to person, place, and time.      Cranial Nerves: Cranial nerves 2-12 are intact. No cranial nerve deficit.      Sensory: No sensory deficit.          DIAGNOSTIC RESULTS     LABS:  Labs Reviewed   CBC WITH AUTO DIFFERENTIAL - Abnormal       Result Value    WBC 8.6      nRBC 0.0      RBC 4.22      Hemoglobin 13.4      Hematocrit 38.9      MCV 92      MCH 31.8      MCHC 34.4      RDW 13.5      Platelets 240      Neutrophils % 81.7      Immature Granulocytes %, Automated 0.4      Lymphocytes % 5.3      Monocytes % 12.4      Eosinophils % 0.0      Basophils % 0.2      Neutrophils Absolute 7.00      Immature Granulocytes Absolute, Automated 0.03      Lymphocytes Absolute 0.45 (*)      Monocytes Absolute 1.06 (*)     Eosinophils Absolute 0.00      Basophils Absolute 0.02     COMPREHENSIVE METABOLIC PANEL - Abnormal    Glucose 89      Sodium 124 (*)     Potassium 3.3 (*)     Chloride 89 (*)     Bicarbonate 25      Anion Gap 13      Urea Nitrogen 9      Creatinine 0.57      eGFR >90      Calcium 8.7      Albumin 3.8      Alkaline Phosphatase 59      Total Protein 6.6      AST 52 (*)     Bilirubin, Total 0.7      ALT 45     SARS-COV-2 AND INFLUENZA A/B PCR - Abnormal    Flu A Result Detected (*)     Flu B Result Not Detected      Coronavirus 2019, PCR Not Detected      Narrative:     This assay has received FDA Emergency Use Authorization (EUA) and  is only authorized for the duration of time that circumstances exist to justify the authorization of the emergency use of in vitro diagnostic tests for the detection of SARS-CoV-2 virus and/or diagnosis of COVID-19 infection under section 564(b)(1) of the Act, 21 U.S.C. 360bbb-3(b)(1). Testing for SARS-CoV-2 is only recommended for patients who meet current clinical and/or epidemiological criteria as defined by federal, state, or local public health directives. This assay is an in vitro diagnostic nucleic acid amplification test for the qualitative detection of SARS-CoV-2, Influenza A, and Influenza B from nasopharyngeal specimens and has been validated for use at [facility]. Negative results do not preclude COVID-19 infections or Influenza A/B infections, and should not be used as the sole basis for diagnosis, treatment, or other management decisions. If Influenza A/B and RSV PCR results are negative, testing for Parainfluenza virus, Adenovirus and Metapneumovirus is routinely performed for [facility] [place] in[place], and is available on other patients by placing an add-on request.    MAGNESIUM - Abnormal    Magnesium 1.31 (*)    LACTATE - Normal    Lactate 1.0      Narrative:     Venipuncture  immediately after or during the administration of Metamizole may lead to falsely low results. Testing should be performed immediately  prior to Metamizole dosing.   SERIAL TROPONIN-INITIAL - Normal    Troponin I, High Sensitivity 11      Narrative:     Less than 99th percentile of normal range cutoff-  Female and children under 18 years old <14 ng/L; Male <21 ng/L: Negative  Repeat testing should be performed if clinically indicated.     Female and children under 18 years old 14-50 ng/L; Male 21-50 ng/L:  Consistent with possible cardiac damage and possible increased clinical   risk. Serial measurements may help to assess extent of myocardial damage.     >50 ng/L: Consistent with cardiac damage, increased clinical risk and  myocardial infarction. Serial measurements may help assess extent of   myocardial damage.      NOTE: Children less than 1 year old may have higher baseline troponin   levels and results should be interpreted in conjunction with the overall   clinical context.     NOTE: Troponin I testing is performed using a different   testing methodology at [facility] than at other   [facility]. Direct result comparisons should only   be made within the same method.   SERIAL TROPONIN, 1 HOUR - Normal    Troponin I, High Sensitivity 11      Narrative:     Less than 99th percentile of normal range cutoff-  Female and children under 18 years old <14 ng/L; Male <21 ng/L: Negative  Repeat testing should be performed if clinically indicated.     Female and children under 18 years old 14-50 ng/L; Male 21-50 ng/L:  Consistent with possible cardiac damage and possible increased clinical   risk. Serial measurements may help to assess extent of myocardial damage.     >50 ng/L: Consistent with cardiac damage, increased clinical risk and  myocardial infarction. Serial measurements may help assess extent of   myocardial damage.      NOTE: Children less than 1 year old may have higher baseline troponin  "  levels and results should be interpreted in conjunction with the overall   clinical context.     NOTE: Troponin I testing is performed using a different   testing methodology at [facility] than at other   [facility] hospitals. Direct result comparisons should only   be made within the same method.   BLOOD CULTURE   BLOOD CULTURE   TROPONIN SERIES- (INITIAL, 1 HR)    Narrative:     The following orders were created for panel order Troponin Series, (0, 1 HR).  Procedure                               Abnormality         Status                     ---------                               -----------         ------                     Troponin I, High Sensiti...[[id number]]  Normal              Final result               Troponin, High Sensitivi...[[id number]]  Normal              Final result                 Please view results for these tests on the individual orders.   URINALYSIS WITH REFLEX MICROSCOPIC       All other labs were within normal range or not returned as of this dictation.    Imaging  No orders to display        Procedures  Procedures     EMERGENCY DEPARTMENT COURSE/MDM:     Diagnoses as of 02/29/24 1915   Hyponatremia   Influenza   Atrial fibrillation, unspecified type (CMS/[facility])        Medical Decision Making  \"Pegg\" is a 68 year old female presenting to the ED with Flu like symptoms and Afib for 2 weeks.     On presentation patient is tachycardic with fever of 39.3. Hemodynamically stable. On exam patient heart rate is tachycardic and irregular. Lungs clear to auscultation bilateally without rales, rhonchi, or wheezes. Patient does have no productive cough with dry.   Nasal swab was positive for influenza. Given 2 weeks of symptoms, outside of window for Tamiflu. Patient is hyponatremic to 134, Mg 1.34, K 3.1. No leukocytosis. Troponins 11 and 11.   ECG shows Afib with RVR at rate of 118. Rates changes between 70s and 110 in room with patient. No St elevations.  Tylenol was ordered for fever as " this could be contributing to heart rat. Ordered a liter of normal saline bolus and magnesium and potassium repletion. Patient was otherwise hemodynamically stable. Due to afib and hyponatremia patient will be requiring hospitalization. Admitted to [facility] under Dr. Bailey         Patient and or family in agreement and understanding of treatment plan.  All questions answered.      I reviewed the case with the attending ED physician. The attending ED physician agrees with the plan. Patient and/or patient´s representative was counseled regarding labs, imaging, likely diagnosis, and plan. All questions were answered.    ED Medications administered this visit:    Medications   magnesium sulfate IV 2 g (2 g intravenous Incomplete 2/29/24 1816)   sodium chloride 0.9 % bolus 1,000 mL (1,000 mL intravenous New Bag 2/29/24 1818)   acetaminophen (Tylenol) tablet 650 mg (650 mg oral Given 2/29/24 1811)   potassium chloride CR (Klor-Con M20) ER tablet 40 mEq (40 mEq oral Given 2/29/24 1811)       New Prescriptions from this visit:    New Prescriptions    No medications on file       Follow-up:  No follow-up provider specified.      Final Impression:   1. Hyponatremia    2. Influenza    3. Atrial fibrillation, unspecified type (CMS/HCC)          (Please note that portions of this note were completed with a voice recognition program.  Efforts were made to edit the dictations but occasionally words are mis-transcribed.)     Suhas Soto DO  Resident  02/29/24 1915

## 2024-02-29 NOTE — ED PROVIDER NOTES
"HPI   Chief Complaint   Patient presents with    Flu Symptoms     Patient also states she has pain all over like the \"flu\" and also has a-fib       HPI                    David Coma Scale Score: 15                     Patient History   Past Medical History:   Diagnosis Date    A-fib (CMS/[facility])     Abnormal screening cardiac CT     6/20:1. Coronary artery calcium score of 137*. (LAD, LCX)     2. 77th percentile** for age, gender in asymptomatic patients.     *Coronary Artery  Agatston score    Arrhythmia     H/O bone density study     11/6/20: -2.3(radius), -1.5 (femur)     10/24/17: -1.3     3/21/13: THE LOWEST T-SCORE IS -1.8      1) DIAGNOSIS (based on BMD alone): OSTEOPENIA    H/O bone density study 10/20/2023    Right Forearm  Bone Density: 0.643 g/cm2 . . T Score: -2.8, Femur -1.4    H/O chest x-ray     5/21: normal    H/O CT scan of abdomen     5/2021: .  Colonic diverticulosis. Findings consistent with acute diverticulitis involving the     distal descending colon. No evidence of bowel obstruction, free intraperitoneal air or     abnormal intra-abdominal fluid collection.    H/O diagnostic ultrasound     7/20: US kidney: normal    H/O echocardiogram     6/21: CONCLUSIONS:     1. The left ventricular systolic function is normal with a 60-65% estimated ejection     fraction.     2. Spectral Doppler shows an impaired relaxation pattern of left ventricular diastolic filling     Mild MR/TR    History of Holter monitoring     1/19: Underlying AFIB with RVR     5/21: NSR with occ isolated PVCs (when had sx)    History of MRI of lumbar spine     3/13: Multilevel degenerative changes with various degrees of canal and foraminal narrowing seen throughout the lumbar spine. Findings are most significant at the L3 -- L4 level where there is severe canal stenosis, right subarticular recess narrowing, and moderate to severe right foraminal narrowing.Mod-sev levoconvex scoliosis. Subtle edema involving the upper S2 vertebra, " perhaps rep insuff fx.    History of nuclear stress test     9/12: normal    Hypertension     Hypothyroid     Raynaud's disease     Syncope and collapse      Past Surgical History:   Procedure Laterality Date    ANKLE SURGERY  07/14/2023    1. LEFT ANKLE ARTHROTOMY WITH SYNEVECTOMY/ LET PARTIAL TIBIA EXCISION/ LEFT SUBTALAR JOINT INJECTION WITH C-ARM    BACK SURGERY      L2-S1 discectomy and fusio    BELT ABDOMINOPLASTY      Abdominoplasty    BUNIONECTOMY      Bunionectomy    CARDIAC CATHETERIZATION  02/2022    Cardiac catheterization    CARDIOVERSION  02/2022    Cardioversion    CATARACT EXTRACTION      Cataract surgery    COLONOSCOPY  12/2019 12/19: diverticulosis, 2mm polyp (TA)     7/19/06: Impression:Hyperplastic polyp sigmoid-removed.  Normal rectum. Normal      cecum. Normal ascending colon and ileocecal valve. A single diminutive      polyp found in the sigmoid; removed by cold biopsy polypectomy.    ESOPHAGOGASTRODUODENOSCOPY  10/08/2021    10/8/21: - Normal examined duodenum. - Erythematous mucosa in the antrum. Biopsied.- 3 cm hiatal hernia.    HIP SURGERY      Right DEBORAH x2    TIBIA FRACTURE SURGERY      left tib/fib ORIF    TOTAL KNEE ARTHROPLASTY      3/21, 9/20 (bilateral)    TUBAL LIGATION      Tubal ligation    WRIST SURGERY      Wrist surgery     Family History   Problem Relation Name Age of Onset    Osteoporotic fracture Mother          see social doc    Other (carotid artery stenosis) Father      Coronary artery disease Father      Other (coronary artery bypass graft) Father      Peripheral vascular disease Father      Atrial fibrillation Other       Social History     Tobacco Use    Smoking status: Never    Smokeless tobacco: Never   Substance Use Topics    Alcohol use: Yes     Comment: socially    Drug use: Never       Physical Exam   ED Triage Vitals [02/29/24 1415]   Temperature Heart Rate Respirations BP   (!) 39.2 °C (102.6 °F) (!) 127 (!) 22 142/85      Pulse Ox Temp Source Heart Rate  Source Patient Position   95 % Temporal -- Sitting      BP Location FiO2 (%)     Right arm --       Physical Exam    ED Course & MDM   Diagnoses as of 02/29/24 1803   Hyponatremia   Influenza       Medical Decision Making  =================Attending note===============    The patient was seen by the resident/fellow.  I have personally performed a substantive portion of the encounter.  I have seen and examined the patient; agree with the workup, evaluation, MDM,   management and diagnosis.  The care plan has been discussed with the resident; I have reviewed the resident's note and agree with the documented findings.      This is a 68 y.o. female who presents to ER with 2 to 3 weeks of not feeling well.  States she went in A-fib around 3 weeks ago.  For 2 weeks has been having some increasing weakness and occasional confusion.  She cannot give me any examples of the confusion.  She had some headache and lightheadedness.  She has had some shortness of breath with occasional cough.  Cough been going on for weeks also.  States that she was so tired she missed an orthopedic appointment on Tuesday.  She did have an ankle fracture around 6 months ago.  Is been no chest pain.  No leg swelling.  She does have a history of A-fib and she has been cardioverted 3 times in the past.  She is on diltiazem, flecainide, Eliquis.  Heart is regular.  Lungs are clear.  Abdomen is soft and nontender.  She is awake and alert and conversant.    Patient does have significant hyponatremia with a sodium of 124.  She has had some hyponatremia in the past, but this is worse.  Her symptoms can also be coming from his hyponatremia.  She is given saline.  She is positive for flu.  She is outside the window for Tamiflu.    Patient will need admitted for further evaluation.    EKG: Atrial fibrillation with rapid ventricular rate of 118.  QTc 398.  No ST elevation.    Patient does have a fever.  She was ordered Tylenol.  This could also be  contributing to the elevated heart rate.      On telemetry the patient's heart rates been between 90 and 110.    Patient is admitted to the hospital for further treatment and evaluation.          ==========================================          Procedure  Procedures

## 2024-03-01 VITALS
OXYGEN SATURATION: 99 % | RESPIRATION RATE: 16 BRPM | SYSTOLIC BLOOD PRESSURE: 147 MMHG | HEIGHT: 64 IN | HEART RATE: 94 BPM | WEIGHT: 148 LBS | DIASTOLIC BLOOD PRESSURE: 81 MMHG | BODY MASS INDEX: 25.27 KG/M2 | TEMPERATURE: 96.1 F

## 2024-03-01 LAB
ALBUMIN SERPL BCP-MCNC: 3.5 G/DL (ref 3.4–5)
ALP SERPL-CCNC: 52 U/L (ref 33–136)
ALT SERPL W P-5'-P-CCNC: 34 U/L (ref 7–45)
ANION GAP SERPL CALC-SCNC: 13 MMOL/L (ref 10–20)
APPEARANCE UR: ABNORMAL
AST SERPL W P-5'-P-CCNC: 38 U/L (ref 9–39)
BILIRUB SERPL-MCNC: 0.6 MG/DL (ref 0–1.2)
BILIRUB UR STRIP.AUTO-MCNC: NEGATIVE MG/DL
BUN SERPL-MCNC: 10 MG/DL (ref 6–23)
CALCIUM SERPL-MCNC: 8.2 MG/DL (ref 8.6–10.3)
CHLORIDE SERPL-SCNC: 96 MMOL/L (ref 98–107)
CHLORIDE UR-SCNC: 87 MMOL/L
CHLORIDE/CREATININE (MMOL/G) IN URINE: 163 MMOL/G CREAT (ref 38–318)
CO2 SERPL-SCNC: 22 MMOL/L (ref 21–32)
COLOR UR: YELLOW
CREAT SERPL-MCNC: 0.46 MG/DL (ref 0.5–1.05)
CREAT UR-MCNC: 53.5 MG/DL (ref 20–320)
EGFRCR SERPLBLD CKD-EPI 2021: >90 ML/MIN/1.73M*2
ERYTHROCYTE [DISTWIDTH] IN BLOOD BY AUTOMATED COUNT: 13.5 % (ref 11.5–14.5)
GLUCOSE SERPL-MCNC: 92 MG/DL (ref 74–99)
GLUCOSE UR STRIP.AUTO-MCNC: NEGATIVE MG/DL
HCT VFR BLD AUTO: 39.6 % (ref 36–46)
HGB BLD-MCNC: 13.1 G/DL (ref 12–16)
KETONES UR STRIP.AUTO-MCNC: ABNORMAL MG/DL
LEUKOCYTE ESTERASE UR QL STRIP.AUTO: NEGATIVE
MAGNESIUM SERPL-MCNC: 1.9 MG/DL (ref 1.6–2.4)
MCH RBC QN AUTO: 30.7 PG (ref 26–34)
MCHC RBC AUTO-ENTMCNC: 33.1 G/DL (ref 32–36)
MCV RBC AUTO: 93 FL (ref 80–100)
NITRITE UR QL STRIP.AUTO: NEGATIVE
NRBC BLD-RTO: 0 /100 WBCS (ref 0–0)
PH UR STRIP.AUTO: 6 [PH]
PLATELET # BLD AUTO: 219 X10*3/UL (ref 150–450)
POTASSIUM SERPL-SCNC: 3.7 MMOL/L (ref 3.5–5.3)
POTASSIUM UR-SCNC: 33 MMOL/L
POTASSIUM/CREAT UR-RTO: 62 MMOL/G CREAT
PROT SERPL-MCNC: 6 G/DL (ref 6.4–8.2)
PROT UR STRIP.AUTO-MCNC: NEGATIVE MG/DL
RBC # BLD AUTO: 4.27 X10*6/UL (ref 4–5.2)
RBC # UR STRIP.AUTO: NEGATIVE /UL
SODIUM SERPL-SCNC: 127 MMOL/L (ref 136–145)
SODIUM UR-SCNC: 73 MMOL/L
SODIUM/CREAT UR-RTO: 136 MMOL/G CREAT
SP GR UR STRIP.AUTO: 1.01
UROBILINOGEN UR STRIP.AUTO-MCNC: 4 MG/DL
WBC # BLD AUTO: 6.1 X10*3/UL (ref 4.4–11.3)

## 2024-03-01 PROCEDURE — 2500000002 HC RX 250 W HCPCS SELF ADMINISTERED DRUGS (ALT 637 FOR MEDICARE OP, ALT 636 FOR OP/ED): Mod: MUE | Performed by: STUDENT IN AN ORGANIZED HEALTH CARE EDUCATION/TRAINING PROGRAM

## 2024-03-01 PROCEDURE — 2500000004 HC RX 250 GENERAL PHARMACY W/ HCPCS (ALT 636 FOR OP/ED): Performed by: STUDENT IN AN ORGANIZED HEALTH CARE EDUCATION/TRAINING PROGRAM

## 2024-03-01 PROCEDURE — 83930 ASSAY OF BLOOD OSMOLALITY: CPT | Mod: STJLAB | Performed by: STUDENT IN AN ORGANIZED HEALTH CARE EDUCATION/TRAINING PROGRAM

## 2024-03-01 PROCEDURE — G0378 HOSPITAL OBSERVATION PER HR: HCPCS

## 2024-03-01 PROCEDURE — 81003 URINALYSIS AUTO W/O SCOPE: CPT | Performed by: EMERGENCY MEDICINE

## 2024-03-01 PROCEDURE — 85027 COMPLETE CBC AUTOMATED: CPT | Performed by: STUDENT IN AN ORGANIZED HEALTH CARE EDUCATION/TRAINING PROGRAM

## 2024-03-01 PROCEDURE — 83935 ASSAY OF URINE OSMOLALITY: CPT | Mod: STJLAB | Performed by: STUDENT IN AN ORGANIZED HEALTH CARE EDUCATION/TRAINING PROGRAM

## 2024-03-01 PROCEDURE — 99239 HOSP IP/OBS DSCHRG MGMT >30: CPT | Performed by: STUDENT IN AN ORGANIZED HEALTH CARE EDUCATION/TRAINING PROGRAM

## 2024-03-01 PROCEDURE — 82436 ASSAY OF URINE CHLORIDE: CPT | Performed by: STUDENT IN AN ORGANIZED HEALTH CARE EDUCATION/TRAINING PROGRAM

## 2024-03-01 PROCEDURE — 80053 COMPREHEN METABOLIC PANEL: CPT | Performed by: STUDENT IN AN ORGANIZED HEALTH CARE EDUCATION/TRAINING PROGRAM

## 2024-03-01 PROCEDURE — 2500000001 HC RX 250 WO HCPCS SELF ADMINISTERED DRUGS (ALT 637 FOR MEDICARE OP): Performed by: STUDENT IN AN ORGANIZED HEALTH CARE EDUCATION/TRAINING PROGRAM

## 2024-03-01 PROCEDURE — 36415 COLL VENOUS BLD VENIPUNCTURE: CPT | Performed by: STUDENT IN AN ORGANIZED HEALTH CARE EDUCATION/TRAINING PROGRAM

## 2024-03-01 PROCEDURE — 83735 ASSAY OF MAGNESIUM: CPT | Performed by: STUDENT IN AN ORGANIZED HEALTH CARE EDUCATION/TRAINING PROGRAM

## 2024-03-01 RX ORDER — LANOLIN ALCOHOL/MO/W.PET/CERES
800 CREAM (GRAM) TOPICAL ONCE
Status: COMPLETED | OUTPATIENT
Start: 2024-03-01 | End: 2024-03-01

## 2024-03-01 RX ORDER — CALCIUM CARBONATE 200(500)MG
500 TABLET,CHEWABLE ORAL DAILY PRN
Status: DISCONTINUED | OUTPATIENT
Start: 2024-03-01 | End: 2024-03-01 | Stop reason: HOSPADM

## 2024-03-01 RX ORDER — FERROUS SULFATE 325(65) MG
1 TABLET ORAL 2 TIMES WEEKLY
Status: DISCONTINUED | OUTPATIENT
Start: 2024-03-01 | End: 2024-03-01 | Stop reason: HOSPADM

## 2024-03-01 RX ORDER — OSELTAMIVIR PHOSPHATE 75 MG/1
75 CAPSULE ORAL 2 TIMES DAILY
Qty: 8 CAPSULE | Refills: 0 | Status: SHIPPED | OUTPATIENT
Start: 2024-03-01 | End: 2024-03-06 | Stop reason: ALTCHOICE

## 2024-03-01 RX ORDER — LEVOTHYROXINE SODIUM 75 UG/1
75 TABLET ORAL
Status: DISCONTINUED | OUTPATIENT
Start: 2024-03-01 | End: 2024-03-01 | Stop reason: HOSPADM

## 2024-03-01 RX ORDER — POTASSIUM CHLORIDE 20 MEQ/1
40 TABLET, EXTENDED RELEASE ORAL ONCE
Status: COMPLETED | OUTPATIENT
Start: 2024-03-01 | End: 2024-03-01

## 2024-03-01 RX ORDER — FLUTICASONE PROPIONATE 50 MCG
2 SPRAY, SUSPENSION (ML) NASAL DAILY
Status: DISCONTINUED | OUTPATIENT
Start: 2024-03-01 | End: 2024-03-01 | Stop reason: HOSPADM

## 2024-03-01 RX ORDER — ACETAMINOPHEN 500 MG
5000 TABLET ORAL DAILY
Status: DISCONTINUED | OUTPATIENT
Start: 2024-03-01 | End: 2024-03-01 | Stop reason: HOSPADM

## 2024-03-01 RX ORDER — POLYETHYLENE GLYCOL 3350 17 G/17G
17 POWDER, FOR SOLUTION ORAL DAILY
Status: DISCONTINUED | OUTPATIENT
Start: 2024-03-01 | End: 2024-03-01 | Stop reason: HOSPADM

## 2024-03-01 RX ORDER — OSELTAMIVIR PHOSPHATE 75 MG/1
75 CAPSULE ORAL 2 TIMES DAILY
Status: DISCONTINUED | OUTPATIENT
Start: 2024-03-01 | End: 2024-03-01 | Stop reason: HOSPADM

## 2024-03-01 RX ORDER — FLUTICASONE PROPIONATE 50 MCG
2 SPRAY, SUSPENSION (ML) NASAL DAILY
Qty: 16 G | Refills: 12 | Status: SHIPPED | OUTPATIENT
Start: 2024-03-02 | End: 2024-03-28 | Stop reason: WASHOUT

## 2024-03-01 RX ADMIN — Medication 800 MG: at 12:44

## 2024-03-01 RX ADMIN — APIXABAN 5 MG: 5 TABLET, FILM COATED ORAL at 09:38

## 2024-03-01 RX ADMIN — OSELTAMAVIR PHOSPHATE 75 MG: 75 CAPSULE ORAL at 05:23

## 2024-03-01 RX ADMIN — FLECAINIDE ACETATE 100 MG: 100 TABLET ORAL at 10:53

## 2024-03-01 RX ADMIN — POTASSIUM CHLORIDE 40 MEQ: 1500 TABLET, EXTENDED RELEASE ORAL at 12:44

## 2024-03-01 RX ADMIN — LEVOTHYROXINE SODIUM 75 MCG: 75 TABLET ORAL at 05:23

## 2024-03-01 RX ADMIN — FLUTICASONE PROPIONATE 2 SPRAY: 50 SPRAY, METERED NASAL at 12:44

## 2024-03-01 RX ADMIN — FERROUS SULFATE TAB 325 MG (65 MG ELEMENTAL FE) 1 TABLET: 325 (65 FE) TAB at 05:23

## 2024-03-01 RX ADMIN — CHOLECALCIFEROL TAB 125 MCG (5000 UNIT) 5000 UNITS: 125 TAB at 09:38

## 2024-03-01 RX ADMIN — OSELTAMAVIR PHOSPHATE 75 MG: 75 CAPSULE ORAL at 10:55

## 2024-03-01 SDOH — SOCIAL STABILITY: SOCIAL INSECURITY: ARE YOU OR HAVE YOU BEEN THREATENED OR ABUSED PHYSICALLY, EMOTIONALLY, OR SEXUALLY BY ANYONE?: NO

## 2024-03-01 SDOH — SOCIAL STABILITY: SOCIAL INSECURITY: DO YOU FEEL ANYONE HAS EXPLOITED OR TAKEN ADVANTAGE OF YOU FINANCIALLY OR OF YOUR PERSONAL PROPERTY?: NO

## 2024-03-01 SDOH — SOCIAL STABILITY: SOCIAL INSECURITY: ABUSE: ADULT

## 2024-03-01 SDOH — SOCIAL STABILITY: SOCIAL INSECURITY: HAS ANYONE EVER THREATENED TO HURT YOUR FAMILY OR YOUR PETS?: NO

## 2024-03-01 SDOH — SOCIAL STABILITY: SOCIAL INSECURITY: ARE THERE ANY APPARENT SIGNS OF INJURIES/BEHAVIORS THAT COULD BE RELATED TO ABUSE/NEGLECT?: NO

## 2024-03-01 SDOH — SOCIAL STABILITY: SOCIAL INSECURITY: WERE YOU ABLE TO COMPLETE ALL THE BEHAVIORAL HEALTH SCREENINGS?: YES

## 2024-03-01 SDOH — SOCIAL STABILITY: SOCIAL INSECURITY: HAVE YOU HAD THOUGHTS OF HARMING ANYONE ELSE?: NO

## 2024-03-01 SDOH — SOCIAL STABILITY: SOCIAL INSECURITY: DO YOU FEEL UNSAFE GOING BACK TO THE PLACE WHERE YOU ARE LIVING?: NO

## 2024-03-01 SDOH — SOCIAL STABILITY: SOCIAL INSECURITY: DOES ANYONE TRY TO KEEP YOU FROM HAVING/CONTACTING OTHER FRIENDS OR DOING THINGS OUTSIDE YOUR HOME?: NO

## 2024-03-01 ASSESSMENT — PAIN SCALES - GENERAL
PAINLEVEL_OUTOF10: 0 - NO PAIN

## 2024-03-01 ASSESSMENT — PAIN - FUNCTIONAL ASSESSMENT: PAIN_FUNCTIONAL_ASSESSMENT: 0-10

## 2024-03-01 ASSESSMENT — ACTIVITIES OF DAILY LIVING (ADL)
ADEQUATE_TO_COMPLETE_ADL: NO
DRESSING YOURSELF: INDEPENDENT
HEARING - RIGHT EAR: FUNCTIONAL
WALKS IN HOME: INDEPENDENT
WALKS IN HOME: INDEPENDENT
LACK_OF_TRANSPORTATION: NO
BATHING: INDEPENDENT
HEARING - LEFT EAR: FUNCTIONAL
HEARING - RIGHT EAR: FUNCTIONAL
GROOMING: INDEPENDENT
JUDGMENT_ADEQUATE_SAFELY_COMPLETE_DAILY_ACTIVITIES: NO
PATIENT'S MEMORY ADEQUATE TO SAFELY COMPLETE DAILY ACTIVITIES?: NO
BATHING: INDEPENDENT
DRESSING YOURSELF: INDEPENDENT
FEEDING YOURSELF: INDEPENDENT
HEARING - LEFT EAR: FUNCTIONAL
PATIENT'S MEMORY ADEQUATE TO SAFELY COMPLETE DAILY ACTIVITIES?: NO
GROOMING: INDEPENDENT
TOILETING: INDEPENDENT
JUDGMENT_ADEQUATE_SAFELY_COMPLETE_DAILY_ACTIVITIES: NO
TOILETING: INDEPENDENT
ADEQUATE_TO_COMPLETE_ADL: NO
FEEDING YOURSELF: INDEPENDENT

## 2024-03-01 ASSESSMENT — COGNITIVE AND FUNCTIONAL STATUS - GENERAL
DAILY ACTIVITIY SCORE: 24
PATIENT BASELINE BEDBOUND: NO
MOBILITY SCORE: 24

## 2024-03-01 ASSESSMENT — LIFESTYLE VARIABLES
HOW MANY STANDARD DRINKS CONTAINING ALCOHOL DO YOU HAVE ON A TYPICAL DAY: PATIENT DOES NOT DRINK
AUDIT-C TOTAL SCORE: 0
SKIP TO QUESTIONS 9-10: 1
HOW OFTEN DO YOU HAVE 6 OR MORE DRINKS ON ONE OCCASION: NEVER
AUDIT-C TOTAL SCORE: 0
HOW OFTEN DO YOU HAVE A DRINK CONTAINING ALCOHOL: NEVER

## 2024-03-01 ASSESSMENT — PATIENT HEALTH QUESTIONNAIRE - PHQ9
2. FEELING DOWN, DEPRESSED OR HOPELESS: NOT AT ALL
SUM OF ALL RESPONSES TO PHQ9 QUESTIONS 1 & 2: 0
1. LITTLE INTEREST OR PLEASURE IN DOING THINGS: NOT AT ALL

## 2024-03-01 NOTE — NURSING NOTE
Pt discharged to home, IV access discontinued, discharge instructions given. Family transporting Pt home.

## 2024-03-01 NOTE — DISCHARGE SUMMARY
Discharge Diagnosis  Flu    Issues Requiring Follow-Up  Follow up with pcp and nephrology     Discharge Meds     Your medication list        START taking these medications        Instructions Last Dose Given Next Dose Due   fluticasone 50 mcg/actuation nasal spray  Commonly known as: Flonase  Start taking on: March 2, 2024      Administer 2 sprays into each nostril once daily. Shake gently. Before first use, prime pump. After use, clean tip and replace cap. Do not start before March 2, 2024.       oseltamivir 75 mg capsule  Commonly known as: Tamiflu      Take 1 capsule (75 mg) by mouth 2 times a day for 8 doses.              CONTINUE taking these medications        Instructions Last Dose Given Next Dose Due   calcium carbonate 215 mg calcium (500 mg) tablet,chewable           cholecalciferol 5,000 Units tablet  Commonly known as: Vitamin D-3           dilTIAZem 30 mg immediate release tablet  Commonly known as: Cardizem      Take 1 tablet (30 mg) by mouth 2 times a day. As needed for palpitations       Eliquis 5 mg tablet  Generic drug: apixaban           ferrous sulfate (325 mg ferrous sulfate) tablet           Fish OiL 1,000 mg (120 mg-180 mg) capsule  Generic drug: omega 3-dha-epa-fish oil           flecainide 100 mg tablet  Commonly known as: Tambocor      Take 1 tablet (100 mg) by mouth 2 times a day.       levothyroxine 75 mcg tablet  Commonly known as: Synthroid, Levoxyl      Take 1 tablet (75 mcg) by mouth once daily in the morning. Take before meals.       multivitamin tablet           PreserVision AREDS 2,148 mcg-113 mg-45 mg-17.4mg tablet  Generic drug: vitamins A,C,E-zinc-copper           Prolia 60 mg/mL syringe  Generic drug: denosumab      Inject 1 mL (60 mg total) under the skin every 6 months.       red yeast rice 600 mg capsule                  STOP taking these medications      ALPRAZolam 0.5 mg tablet  Commonly known as: Xanax                  Where to Get Your Medications        These  medications were sent to [facility] - [place], [place] - [address]  [address] [place] [address]      Phone: [phone]   fluticasone 50 mcg/actuation nasal spray  oseltamivir 75 mg capsule         Test Results Pending At Discharge  Pending Labs       Order Current Status    Blood Culture Preliminary result    Blood Culture Preliminary result            Hospital Course   Ms. Ch is a 68-year-old woman with hypertension, atrial fibrillation, Raynaud's phenomenon, hypothyroidism, vasovagal syncope admitted with acute on chronic hyponatremia and electrolyte disturbances in the setting of flu.     Patient reports approximately 2 weeks of feeling overall weak and unwell.  She states she returned home from [place] and feels she sat next to somebody who was ill on the flight.  She states she has felt generally weak, particularly in her legs, and was unable to get up and move around.  She also endorses fever x 2 days, as well as dizziness x 2 days.  She denies cough, shortness of breath, nausea/vomiting, chest pain.  Patient reports she follows with her cardiologist, who prescribes flecainide, and diltiazem only as needed for palpitations.  She has not needed to take diltiazem throughout this period.  Denies current palpitations.  Patient recently sustained a left ankle fracture and is currently wearing a walking boot.  She was supposed to follow-up with her orthopedic surgeon outpatient last Friday, but missed this appointment due to feeling unwell.     On arrival to emergency department, patient febrile to 39.2, tachycardic to 127, normotensive, tachypneic 22, satting well on room air.  Labs with hyponatremia to 124, hypokalemia to 3.3, hypomagnesemia 1.31, no leukocytosis.  Flu A+.  Troponin normal x 2.  Initial EKG with atrial fibrillation with rates in the 110s.  Patient actually felt well after electrolyte repletion and IV fluids, and wanted to go home, but was advised to stay  for admission.  Patient admitted for management of flu, hyponatremia, A-fib, and electrolyte disturbances.    Hospital course  Overnight, patient's sodium level slightly improved to 127.  Discussed with the patient in detail, she has been dealing with chronic hyponatremia for years without definitive cause, baseline is between 127-130.  Patient however states that she never seen a nephrologist in the past.  Discussed regarding nephrology referral on discharge, patient agrees.  Patient does continue to have flulike symptoms, influenza a is positive.  Patient was started on Tamiflu and will continue on discharge.  Patient discharged in stable condition.  Urine electrolyte/osmolarity ordered prior to discharge, this can be followed up as outpatient.    I have spent > 30min discharging the pt, care time spent include discharge planning, medication reconciliation and discussion of discharge instruction/follow up with the patient.        Pertinent Physical Exam At Time of Discharge  Constitutional: Well developed, awake/alert/oriented x3, no distress, alert and cooperative  Eyes: PERRL, EOMI, clear sclera  ENMT: mucous membranes moist  Head/Neck: Neck supple  Respiratory/Thorax: diminished b/l  Cardiovascular: Regular, rate and rhythm, no murmurs  Gastrointestinal: Nondistended, soft, non-tender  Musculoskeletal: ROM intact  Extremities: normal extremities      Outpatient Follow-Up  Future Appointments   Date Time Provider Department Center   5/10/2024  9:00 AM Petra Sequeira MD [id number] [place]         Nick Watkins MD

## 2024-03-01 NOTE — DISCHARGE INSTRUCTIONS
#it was my pleasure taking care of you during this hospitalization    You were admitted to the hospital due to low sodium level and flu.  You were started on Tamiflu for your flu infection.  Overnight your sodium level improved close to her baseline.  As we discussed, you do have chronic low sodium level for years,  I recommend for you to follow-up with a nephrologist for further evaluation.    Please ensure adequate  hydration over the next few days, and follow up with your primary care physician / nephrology

## 2024-03-01 NOTE — PROGRESS NOTES
03/01/24 1152   Discharge Planning   Living Arrangements Parent   Support Systems Family members;Friends/neighbors   Assistance Needed none   Type of Residence Private residence   Patient expects to be discharged to: [email or web address]     Introduced myself to the patient and my role in her discharge process. She denies any needs at discharge at this time. Instructed to let her bedside nurse know if she needs anything further. Patient will discharge home.

## 2024-03-01 NOTE — CARE PLAN
Problem: Pain  Goal: My pain/discomfort is manageable  Outcome: Progressing     Problem: Safety  Goal: Patient will be injury free during hospitalization  Outcome: Progressing  Goal: I will remain free of falls  Outcome: Progressing     Problem: Daily Care  Goal: Daily care needs are met  Outcome: Progressing     Problem: Psychosocial Needs  Goal: Demonstrates ability to cope with hospitalization/illness  Outcome: Progressing  Goal: Collaborate with me, my family, and caregiver to identify my specific goals  Outcome: Progressing  Flowsheets (Taken 3/1/2024 0310)  Cultural Requests During Hospitalization: None  Spiritual Requests During Hospitalization: None

## 2024-03-01 NOTE — H&P
History Of Present Illness  Ms. Ch is a 68-year-old woman with hypertension, atrial fibrillation, Raynaud's phenomenon, hypothyroidism, vasovagal syncope admitted with acute on chronic hyponatremia and electrolyte disturbances in the setting of flu.    Patient reports approximately 2 weeks of feeling overall weak and unwell.  She states she returned home from [place] and feels she sat next to somebody who was ill on the flight.  She states she has felt generally weak, particularly in her legs, and was unable to get up and move around.  She also endorses fever x 2 days, as well as dizziness x 2 days.  She denies cough, shortness of breath, nausea/vomiting, chest pain.  Patient reports she follows with her cardiologist, who prescribes flecainide, and diltiazem only as needed for palpitations.  She has not needed to take diltiazem throughout this period.  Denies current palpitations.  Patient recently sustained a left ankle fracture and is currently wearing a walking boot.  She was supposed to follow-up with her orthopedic surgeon outpatient last Friday, but missed this appointment due to feeling unwell.    On arrival to emergency department, patient febrile to 39.2, tachycardic to 127, normotensive, tachypneic 22, satting well on room air.  Labs with hyponatremia to 124, hypokalemia to 3.3, hypomagnesemia 1.31, no leukocytosis.  Flu A+.  Troponin normal x 2.  Initial EKG with atrial fibrillation with rates in the 110s.  Patient actually felt well after electrolyte repletion and IV fluids, and wanted to go home, but was advised to stay for admission.  Patient admitted for management of flu, hyponatremia, A-fib, and electrolyte disturbances.    PMhx: Hypertension, persistent A-fib, Raynaud's phenomenon, hypothyroidism, vasovagal syncope, dyslipidemia, chronic hyponatremia, diverticulosis, anxiety, BPPV, chronic insomnia, spinal stenosis, vitamin D deficiency    Meds: Alprazolam, Tums, vitamin D, diltiazem, Eliquis,  flecainide, iron, levothyroxine, MVI, fish oil, Prolia, PreserVision    All: Demerol, opioids, alendronate, Sistine, lisinopril, nifedipine, rivaroxaban, rosuvastatin    FamHx: Reviewed and not pertinent to presenting complaint    SocHx: Denies alcohol, tobacco, recreational drug use.    ROS: 12 point review of system reviewed and is negative except as noted in HPI     Physical Exam  Gen: well appearing, no acute distress  HEENT: MMM, EOMI, no scleral icterus  CV: irregularly irregular, no murmurs appreciated  Lungs: good air entry b/l, no rhonchi/wheezes appreciated  Abd: soft, nontender, nondistended, normoactive BS  Ext: WWP, LLE with trace edema  Skin: no rashes/lesions  Neuro: Cns III-XII grossly intact, alert and oriented x3  Psych: appropriate mood and affect        Last Recorded Vitals  /78 (BP Location: Right arm, Patient Position: Sitting)   Pulse 74   Temp (!) 39.2 °C (102.6 °F) (Temporal)   Resp (!) 24   Wt 67.1 kg (148 lb)   SpO2 (!) 92%     Relevant Results  Scheduled medications  apixaban, 5 mg, oral, q12h  flecainide, 100 mg, oral, q12h [facility]  [START ON 3/1/2024] levothyroxine, 75 mcg, oral, Daily      Continuous medications     PRN medications    Results for orders placed or performed during the hospital encounter of 02/29/24 (from the past 24 hour(s))   CBC with Differential   Result Value Ref Range    WBC 8.6 4.4 - 11.3 x10*3/uL    nRBC 0.0 0.0 - 0.0 /100 WBCs    RBC 4.22 4.00 - 5.20 x10*6/uL    Hemoglobin 13.4 12.0 - 16.0 g/dL    Hematocrit 38.9 36.0 - 46.0 %    MCV 92 80 - 100 fL    MCH 31.8 26.0 - 34.0 pg    MCHC 34.4 32.0 - 36.0 g/dL    RDW 13.5 11.5 - 14.5 %    Platelets 240 150 - 450 x10*3/uL    Neutrophils % 81.7 40.0 - 80.0 %    Immature Granulocytes %, Automated 0.4 0.0 - 0.9 %    Lymphocytes % 5.3 13.0 - 44.0 %    Monocytes % 12.4 2.0 - 10.0 %    Eosinophils % 0.0 0.0 - 6.0 %    Basophils % 0.2 0.0 - 2.0 %    Neutrophils Absolute 7.00 1.20 - 7.70 x10*3/uL    Immature  Granulocytes Absolute, Automated 0.03 0.00 - 0.70 x10*3/uL    Lymphocytes Absolute 0.45 (L) 1.20 - 4.80 x10*3/uL    Monocytes Absolute 1.06 (H) 0.10 - 1.00 x10*3/uL    Eosinophils Absolute 0.00 0.00 - 0.70 x10*3/uL    Basophils Absolute 0.02 0.00 - 0.10 x10*3/uL   Comprehensive Metabolic Panel   Result Value Ref Range    Glucose 89 74 - 99 mg/dL    Sodium 124 (L) 136 - 145 mmol/L    Potassium 3.3 (L) 3.5 - 5.3 mmol/L    Chloride 89 (L) 98 - 107 mmol/L    Bicarbonate 25 21 - 32 mmol/L    Anion Gap 13 10 - 20 mmol/L    Urea Nitrogen 9 6 - 23 mg/dL    Creatinine 0.57 0.50 - 1.05 mg/dL    eGFR >90 >60 mL/min/1.73m*2    Calcium 8.7 8.6 - 10.3 mg/dL    Albumin 3.8 3.4 - 5.0 g/dL    Alkaline Phosphatase 59 33 - 136 U/L    Total Protein 6.6 6.4 - 8.2 g/dL    AST 52 (H) 9 - 39 U/L    Bilirubin, Total 0.7 0.0 - 1.2 mg/dL    ALT 45 7 - 45 U/L   Lactate   Result Value Ref Range    Lactate 1.0 0.4 - 2.0 mmol/L   Troponin I, High Sensitivity, Initial   Result Value Ref Range    Troponin I, High Sensitivity 11 0 - 13 ng/L   Light Blue Top   Result Value Ref Range    Extra Tube Hold for add-ons.    Magnesium   Result Value Ref Range    Magnesium 1.31 (L) 1.60 - 2.40 mg/dL   Sars-CoV-2 and Influenza A/B PCR   Result Value Ref Range    Flu A Result Detected (A) Not Detected    Flu B Result Not Detected Not Detected    Coronavirus 2019, PCR Not Detected Not Detected   ECG 12 lead   Result Value Ref Range    Ventricular Rate 118 BPM    QRS Duration 86 ms    QT Interval 284 ms    QTC Calculation(Bazett) 398 ms    R Axis 185 degrees    T Axis -22 degrees    QRS Count 19 beats    Q Onset 217 ms    T Offset 359 ms    QTC Fredericia 355 ms   Troponin, High Sensitivity, 1 Hour   Result Value Ref Range    Troponin I, High Sensitivity 11 0 - 13 ng/L          Assessment/Plan   Ms. Ch is a 68-year-old woman with hypertension, atrial fibrillation, Raynaud's phenomenon, hypothyroidism, vasovagal syncope admitted with acute on chronic  hyponatremia and electrolyte disturbances in the setting of flu.    # Generalized weakness  # Acute on chronic hyponatremia  # Hypokalemia  # Hypomagnesemia  # Flu A  -Suspect symptoms in the setting of electrolyte derangements as well as flu  -Suspect hypovolemic hyponatremia in setting of poor p.o. and flu  -Patient symptomatically improved with interventions in emergency department, however advised the ED physician to stay for admission  -Status post 1 L normal saline, follow-up a.m. BMP  -Status post magnesium and potassium repletion, follow-up a.m. electrolyte  -Tamiflu given onset of fever 2 days ago, patient hospitalized  -PT/OT initially ordered generalized weakness, however patient symptomatically improved, so order cancelled    #Afib  -Rate controlled now with fluids  -Continue home flecainide  -Continue home apixaban  -Patient takes diltiazem as needed for palpitations, currently states she does not need this medication    #L ankle fracture  -Continue walking boot  -Patient to follow-up with her orthopedic surgeon outpatient    FEN/GI: regular diet  Access: PIV  Ppx: AC  Code: full - confirmed on admission   Bernice Murillo MD

## 2024-03-01 NOTE — CARE PLAN
The patient's goals for the shift include      The clinical goals for the shift include Pt will remain hemodynamically stable call    Problem: Pain  Goal: My pain/discomfort is manageable  Outcome: Met     Problem: Safety  Goal: Patient will be injury free during hospitalization  Outcome: Met  Goal: I will remain free of falls  Outcome: Met     Problem: Daily Care  Goal: Daily care needs are met  Outcome: Met     Problem: Psychosocial Needs  Goal: Demonstrates ability to cope with hospitalization/illness  Outcome: Met  Goal: Collaborate with me, my family, and caregiver to identify my specific goals  Outcome: Met     Problem: Discharge Barriers  Goal: My discharge needs are met  Outcome: Met     Problem: Pain - Adult  Goal: Verbalizes/displays adequate comfort level or baseline comfort level  Outcome: Met     Problem: Safety - Adult  Goal: Free from fall injury  Outcome: Met     Problem: Chronic Conditions and Co-morbidities  Goal: Patient's chronic conditions and co-morbidity symptoms are monitored and maintained or improved  Outcome: Met     Problem: Infection related to problem list condition  Goal: Infection will resolve through treatment  Outcome: Met     Problem: Fall/Injury  Goal: Not fall by end of shift  Outcome: Met  Goal: Be free from injury by end of the shift  Outcome: Met  Goal: Verbalize understanding of personal risk factors for fall in the hospital  Outcome: Met  Goal: Verbalize understanding of risk factor reduction measures to prevent injury from fall in the home  Outcome: Met  Goal: Use assistive devices by end of the shift  Outcome: Met  Goal: Pace activities to prevent fatigue by end of the shift  Outcome: Met     Problem: Skin  Goal: Decreased wound size/increased tissue granulation at next dressing change  Outcome: Met  Goal: Participates in plan/prevention/treatment measures  3/1/2024 1545 by Bell Orellana RN  Outcome: Met  3/1/2024 0805 by Bell Orellana RN  Note: Pt to wash and  lotion skin   Goal: Prevent/manage excess moisture  Outcome: Met  Goal: Prevent/minimize sheer/friction injuries  Outcome: Met  Goal: Promote/optimize nutrition  Outcome: Met  Goal: Promote skin healing  Outcome: Met

## 2024-03-02 LAB
OSMOLALITY SERPL: 268 MOSM/KG (ref 280–300)
OSMOLALITY UR: 404 MOSM/KG (ref 200–1200)

## 2024-03-04 ENCOUNTER — DOCUMENTATION (OUTPATIENT)
Dept: PRIMARY CARE | Facility: CLINIC | Age: 69
End: 2024-03-04
Payer: MEDICARE

## 2024-03-04 LAB — BACTERIA BLD CULT: NORMAL

## 2024-03-05 ENCOUNTER — PATIENT OUTREACH (OUTPATIENT)
Dept: PRIMARY CARE | Facility: CLINIC | Age: 69
End: 2024-03-05
Payer: MEDICARE

## 2024-03-05 LAB — BACTERIA BLD CULT: NORMAL

## 2024-03-05 NOTE — PROGRESS NOTES
Discharge Facility: [facility]  Discharge Diagnosis: FLU  Admission Date: 3/1/24  Discharge Date: 3/1/24    PCP Appointment Date: none avail. Task to office  Specialist Appointment Date: n/a  Hospital Encounter and Summary: Linked     Hao Jordan LPN

## 2024-03-06 ENCOUNTER — TELEMEDICINE (OUTPATIENT)
Dept: PRIMARY CARE | Facility: CLINIC | Age: 69
End: 2024-03-06
Payer: MEDICARE

## 2024-03-06 DIAGNOSIS — J01.10 ACUTE NON-RECURRENT FRONTAL SINUSITIS: ICD-10-CM

## 2024-03-06 DIAGNOSIS — R11.2 NAUSEA AND VOMITING, UNSPECIFIED VOMITING TYPE: ICD-10-CM

## 2024-03-06 DIAGNOSIS — J10.1 INFLUENZA A: Primary | ICD-10-CM

## 2024-03-06 PROCEDURE — 1126F AMNT PAIN NOTED NONE PRSNT: CPT | Performed by: NURSE PRACTITIONER

## 2024-03-06 PROCEDURE — 1159F MED LIST DOCD IN RCRD: CPT | Performed by: NURSE PRACTITIONER

## 2024-03-06 PROCEDURE — 1123F ACP DISCUSS/DSCN MKR DOCD: CPT | Performed by: NURSE PRACTITIONER

## 2024-03-06 PROCEDURE — 99496 TRANSJ CARE MGMT HIGH F2F 7D: CPT | Performed by: NURSE PRACTITIONER

## 2024-03-06 PROCEDURE — 1160F RVW MEDS BY RX/DR IN RCRD: CPT | Performed by: NURSE PRACTITIONER

## 2024-03-06 PROCEDURE — 1158F ADVNC CARE PLAN TLK DOCD: CPT | Performed by: NURSE PRACTITIONER

## 2024-03-06 PROCEDURE — 3008F BODY MASS INDEX DOCD: CPT | Performed by: NURSE PRACTITIONER

## 2024-03-06 PROCEDURE — 1157F ADVNC CARE PLAN IN RCRD: CPT | Performed by: NURSE PRACTITIONER

## 2024-03-06 PROCEDURE — 1036F TOBACCO NON-USER: CPT | Performed by: NURSE PRACTITIONER

## 2024-03-06 RX ORDER — AMOXICILLIN AND CLAVULANATE POTASSIUM 875; 125 MG/1; MG/1
875 TABLET, FILM COATED ORAL 2 TIMES DAILY
Qty: 14 TABLET | Refills: 0 | Status: SHIPPED | OUTPATIENT
Start: 2024-03-06 | End: 2024-03-13

## 2024-03-06 RX ORDER — ONDANSETRON 4 MG/1
4 TABLET, ORALLY DISINTEGRATING ORAL EVERY 8 HOURS PRN
Qty: 20 TABLET | Refills: 0 | Status: SHIPPED | OUTPATIENT
Start: 2024-03-06 | End: 2024-03-13

## 2024-03-06 RX ORDER — LEVALBUTEROL TARTRATE 45 UG/1
1-2 AEROSOL, METERED ORAL EVERY 4 HOURS PRN
Qty: 15 G | Refills: 11 | Status: SHIPPED | OUTPATIENT
Start: 2024-03-06 | End: 2024-03-28 | Stop reason: WASHOUT

## 2024-03-06 RX ORDER — ONDANSETRON 4 MG/1
4 TABLET, FILM COATED ORAL EVERY 8 HOURS PRN
COMMUNITY
Start: 2024-02-13 | End: 2024-03-06 | Stop reason: WASHOUT

## 2024-03-06 RX ORDER — METHYLPREDNISOLONE 4 MG/1
TABLET ORAL
Qty: 21 TABLET | Refills: 0 | Status: SHIPPED | OUTPATIENT
Start: 2024-03-06 | End: 2024-03-13

## 2024-03-06 ASSESSMENT — PATIENT HEALTH QUESTIONNAIRE - PHQ9
1. LITTLE INTEREST OR PLEASURE IN DOING THINGS: NOT AT ALL
SUM OF ALL RESPONSES TO PHQ9 QUESTIONS 1 AND 2: 0
2. FEELING DOWN, DEPRESSED OR HOPELESS: NOT AT ALL

## 2024-03-06 NOTE — PROGRESS NOTES
"An interactive audio and video telecommunication system which permits real time communications between the patient (at the originating site) and provider (at the distant site) was utilized to provide this telehealth service.  Verbal consent was requested and obtained from the patient for this telehealth visit.       Patient: Delaney Ch \"Lee\"  : 1955  PCP: Kathleen Rice MD  MRN: [id number]  Program: No linked episodes         Delaney Ch \"Lee\" is a 68 y.o. female presenting today for follow-up after being discharged from the hospital 5 days ago. The main problem requiring admission was influenza. The discharge summary and/or Transitional Care Management documentation was reviewed. Medication reconciliation was performed as indicated via the \"Dante as Reviewed\" timestamp.     Delaney Ch \"Lee\" was contacted by Transitional Care Management services two days after her discharge. This encounter and supporting documentation was reviewed.    The complexity of medical decision making for this patient's transitional care is high.    Started Tuesday of last week  Called squad a week ago couldn't get out of bed  Flu A, given tamiflu  Given magnesium and sodium drip  Took tamiflu finished last night   Mom has covid but she has tested negative twice for it.   Still sick  Terrible cough, weakness   Green and red mucus   Getting lighter in color   Asking for abx - zofran   Loss of appetite lost 8 lbs   Day 9  Feeling a little better  Can get to bathroom and down steps  Feels like labored breathing and wheeze  SOB always  No albuterol at home  No recent fevers         Hospital course copied:    Ms. Ch is a 68-year-old woman with hypertension, atrial fibrillation, Raynaud's phenomenon, hypothyroidism, vasovagal syncope admitted with acute on chronic hyponatremia and electrolyte disturbances in the setting of flu.     Patient reports approximately 2 weeks of feeling overall weak and unwell.  She states she returned " home from [place] and feels she sat next to somebody who was ill on the flight.  She states she has felt generally weak, particularly in her legs, and was unable to get up and move around.  She also endorses fever x 2 days, as well as dizziness x 2 days.  She denies cough, shortness of breath, nausea/vomiting, chest pain.  Patient reports she follows with her cardiologist, who prescribes flecainide, and diltiazem only as needed for palpitations.  She has not needed to take diltiazem throughout this period.  Denies current palpitations.  Patient recently sustained a left ankle fracture and is currently wearing a walking boot.  She was supposed to follow-up with her orthopedic surgeon outpatient last Friday, but missed this appointment due to feeling unwell.     On arrival to emergency department, patient febrile to 39.2, tachycardic to 127, normotensive, tachypneic 22, satting well on room air.  Labs with hyponatremia to 124, hypokalemia to 3.3, hypomagnesemia 1.31, no leukocytosis.  Flu A+.  Troponin normal x 2.  Initial EKG with atrial fibrillation with rates in the 110s.  Patient actually felt well after electrolyte repletion and IV fluids, and wanted to go home, but was advised to stay for admission.  Patient admitted for management of flu, hyponatremia, A-fib, and electrolyte disturbances.     Hospital course  Overnight, patient's sodium level slightly improved to 127.  Discussed with the patient in detail, she has been dealing with chronic hyponatremia for years without definitive cause, baseline is between 127-130.  Patient however states that she never seen a nephrologist in the past.  Discussed regarding nephrology referral on discharge, patient agrees.  Patient does continue to have flulike symptoms, influenza a is positive.  Patient was started on Tamiflu and will continue on discharge.  Patient discharged in stable condition.  Urine electrolyte/osmolarity ordered prior to discharge, this can be followed up  as outpatient.    Labs copied:    Component      Latest Ref Rng 2/29/2024 3/1/2024   WBC      4.4 - 11.3 x10*3/uL 8.6  6.1    nRBC      0.0 - 0.0 /100 WBCs 0.0  0.0    RBC      4.00 - 5.20 x10*6/uL 4.22  4.27    HEMOGLOBIN      12.0 - 16.0 g/dL 13.4  13.1    HEMATOCRIT      36.0 - 46.0 % 38.9  39.6    MCV      80 - 100 fL 92  93    MCH      26.0 - 34.0 pg 31.8  30.7    MCHC      32.0 - 36.0 g/dL 34.4  33.1    RED CELL DISTRIBUTION WIDTH      11.5 - 14.5 % 13.5  13.5    Platelets      150 - 450 x10*3/uL 240  219    Neutrophils %      40.0 - 80.0 % 81.7     Immature Granulocytes %, Automated      0.0 - 0.9 % 0.4     Lymphocytes %      13.0 - 44.0 % 5.3     Monocytes %      2.0 - 10.0 % 12.4     Eosinophils %      0.0 - 6.0 % 0.0     Basophils %      0.0 - 2.0 % 0.2     Neutrophils Absolute      1.20 - 7.70 x10*3/uL 7.00     Immature Granulocytes Absolute, Automated      0.00 - 0.70 x10*3/uL 0.03     Lymphocytes Absolute      1.20 - 4.80 x10*3/uL 0.45 (L)     Monocytes Absolute      0.10 - 1.00 x10*3/uL 1.06 (H)     Eosinophils Absolute      0.00 - 0.70 x10*3/uL 0.00     Basophils Absolute      0.00 - 0.10 x10*3/uL 0.02     GLUCOSE      74 - 99 mg/dL 89  92    SODIUM      136 - 145 mmol/L 124 (L)  127 (L)    POTASSIUM      3.5 - 5.3 mmol/L 3.3 (L)  3.7    CHLORIDE      98 - 107 mmol/L 89 (L)  96 (L)    Bicarbonate      21 - 32 mmol/L 25  22    Anion Gap      10 - 20 mmol/L 13  13    Blood Urea Nitrogen      6 - 23 mg/dL 9  10    Creatinine      0.50 - 1.05 mg/dL 0.57  0.46 (L)    EGFR      >60 mL/min/1.73m*2 >90  >90    Calcium      8.6 - 10.3 mg/dL 8.7  8.2 (L)    Albumin      3.4 - 5.0 g/dL 3.8  3.5    Alkaline Phosphatase      33 - 136 U/L 59  52    Total Protein      6.4 - 8.2 g/dL 6.6  6.0 (L)    AST      9 - 39 U/L 52 (H)  38    Bilirubin Total      0.0 - 1.2 mg/dL 0.7  0.6    ALT      7 - 45 U/L 45  34    Lactate      0.4 - 2.0 mmol/L 1.0     Troponin I, High Sensitivity      0 - 13 ng/L 11     Troponin I, High  Sensitivity       11     MAGNESIUM      1.60 - 2.40 mg/dL 1.31 (L)  1.90    Osmolality, Serum      280 - 300 mOsm/kg  268 (L)       Legend:  (L) Low  (H) High    Component      Latest Ref Rng 3/1/2024   Color, Urine      Straw, Yellow  Yellow    Appearance, Urine      Clear  Hazy ! (N)    Specific Gravity, Urine      1.005 - 1.035  1.012    pH, Urine      5.0, 5.5, 6.0, 6.5, 7.0, 7.5, 8.0  6.0    Protein, Urine      NEGATIVE mg/dL NEGATIVE    Glucose, Urine      NEGATIVE mg/dL NEGATIVE    Blood, Urine      NEGATIVE  NEGATIVE    Ketones, Urine      NEGATIVE mg/dL 5 (TRACE) !    Bilirubin, Urine      NEGATIVE  NEGATIVE    Urobilinogen, Urine      <2.0 mg/dL 4.0 ! (N)    Nitrite, Urine      NEGATIVE  NEGATIVE    Leukocyte Esterase, Urine      NEGATIVE  NEGATIVE    Osmolality, Urine Random      200 - 1,200 mOsm/kg 404       Legend:  ! (N) Normal  ! Abnormal    Imaging copied: N/A    ROS completely negative except what was mentioned in the HPI.  Problem List, surgical, social, and family histories which were reviewed and updated as necessary within the EMR. I also personally reviewed the notes, labs, and imaging that pertained to what was documented or discussed in the HPI.    Physical Exam  Vitals and nursing note reviewed.   Constitutional:       Appearance: Normal appearance.   HENT:      Head: Normocephalic and atraumatic.      Right Ear: External ear normal.      Left Ear: External ear normal.      Nose: Nose normal.      Mouth/Throat:      Mouth: Mucous membranes are moist.   Eyes:      Extraocular Movements: Extraocular movements intact.      Conjunctiva/sclera: Conjunctivae normal.   Pulmonary:      Effort: Pulmonary effort is normal.   Musculoskeletal:      Cervical back: Normal range of motion and neck supple.   Neurological:      General: No focal deficit present.      Mental Status: She is alert and oriented to person, place, and time. Mental status is at baseline.   Psychiatric:         Mood and Affect: Mood  normal.         Behavior: Behavior normal.         Thought Content: Thought content normal.         Judgment: Judgment normal.         Problem List Items Addressed This Visit       Influenza A - Primary    Current Assessment & Plan     on day 9, minimal improvement. Discussed being evaluated in ED due to pt reported labored breathing, wheezing, and weakness. She is unable to check pulse oxygen. Pt refused  Will treat with steroid pack, albuterol, and antibiotic. CXR if she is able to get to radiology. Pt will call tomorrow with update. Advised if symptoms do not improve, call ambulance for hospital transfer         Relevant Medications    ondansetron ODT (Zofran-ODT) 4 mg disintegrating tablet    methylPREDNISolone (Medrol Dospak) 4 mg tablets    levalbuterol (Xopenex) 45 mcg/actuation inhaler    Other Relevant Orders    XR chest 2 views     Other Visit Diagnoses       Acute non-recurrent frontal sinusitis        Relevant Medications    amoxicillin-pot clavulanate (Augmentin) 875-125 mg tablet    Nausea and vomiting, unspecified vomiting type        Relevant Medications    ondansetron ODT (Zofran-ODT) 4 mg disintegrating tablet             Family History   Problem Relation Name Age of Onset    Osteoporotic fracture Mother          see social doc    Other (carotid artery stenosis) Father      Coronary artery disease Father      Other (coronary artery bypass graft) Father      Peripheral vascular disease Father      Atrial fibrillation Other         No data recorded    No follow-ups on file.

## 2024-03-06 NOTE — ASSESSMENT & PLAN NOTE
on day 9, minimal improvement. Discussed being evaluated in ED due to pt reported labored breathing, wheezing, and weakness. She is unable to check pulse oxygen. Pt refused  Will treat with steroid pack, albuterol, and antibiotic. CXR if she is able to get to radiology. Pt will call tomorrow with update. Advised if symptoms do not improve, call ambulance for hospital transfer

## 2024-03-14 ENCOUNTER — HOSPITAL ENCOUNTER (OUTPATIENT)
Dept: RADIOLOGY | Facility: HOSPITAL | Age: 69
Discharge: HOME | End: 2024-03-14
Payer: MEDICARE

## 2024-03-14 ENCOUNTER — PATIENT OUTREACH (OUTPATIENT)
Dept: PRIMARY CARE | Facility: CLINIC | Age: 69
End: 2024-03-14
Payer: MEDICARE

## 2024-03-14 DIAGNOSIS — J10.1 INFLUENZA A: ICD-10-CM

## 2024-03-14 PROCEDURE — 71046 X-RAY EXAM CHEST 2 VIEWS: CPT | Performed by: RADIOLOGY

## 2024-03-14 PROCEDURE — 71046 X-RAY EXAM CHEST 2 VIEWS: CPT

## 2024-03-14 NOTE — PROGRESS NOTES
Call regarding appt. with PCP on (3/6/24) after hospitalization.  At time of outreach call the patient feels as if their condition has (returned to baseline) since last visit.  Reviewed the PCP appointment with the pt and addressed any questions or concerns.     Hao Jordan LPN

## 2024-03-26 ASSESSMENT — ENCOUNTER SYMPTOMS
CHILLS: 0
FLANK PAIN: 0
ABDOMINAL PAIN: 0
DIARRHEA: 0
NAUSEA: 0
BACK PAIN: 0
HEMATURIA: 0
HEADACHES: 0
ANOREXIA: 1
SORE THROAT: 0
VOMITING: 0
CONSTIPATION: 0
DYSURIA: 0
FEVER: 0
FREQUENCY: 0

## 2024-03-28 ENCOUNTER — OFFICE VISIT (OUTPATIENT)
Dept: OBSTETRICS AND GYNECOLOGY | Facility: CLINIC | Age: 69
End: 2024-03-28
Payer: MEDICARE

## 2024-03-28 VITALS
SYSTOLIC BLOOD PRESSURE: 114 MMHG | WEIGHT: 138.6 LBS | HEIGHT: 64 IN | DIASTOLIC BLOOD PRESSURE: 70 MMHG | BODY MASS INDEX: 23.66 KG/M2

## 2024-03-28 DIAGNOSIS — N95.0 PMB (POSTMENOPAUSAL BLEEDING): ICD-10-CM

## 2024-03-28 DIAGNOSIS — R10.2 PELVIC PAIN: ICD-10-CM

## 2024-03-28 PROCEDURE — 99204 OFFICE O/P NEW MOD 45 MIN: CPT | Performed by: OBSTETRICS & GYNECOLOGY

## 2024-03-28 PROCEDURE — 1036F TOBACCO NON-USER: CPT | Performed by: OBSTETRICS & GYNECOLOGY

## 2024-03-28 PROCEDURE — 1159F MED LIST DOCD IN RCRD: CPT | Performed by: OBSTETRICS & GYNECOLOGY

## 2024-03-28 PROCEDURE — 3078F DIAST BP <80 MM HG: CPT | Performed by: OBSTETRICS & GYNECOLOGY

## 2024-03-28 PROCEDURE — 1123F ACP DISCUSS/DSCN MKR DOCD: CPT | Performed by: OBSTETRICS & GYNECOLOGY

## 2024-03-28 PROCEDURE — 1160F RVW MEDS BY RX/DR IN RCRD: CPT | Performed by: OBSTETRICS & GYNECOLOGY

## 2024-03-28 PROCEDURE — 1157F ADVNC CARE PLAN IN RCRD: CPT | Performed by: OBSTETRICS & GYNECOLOGY

## 2024-03-28 PROCEDURE — 3008F BODY MASS INDEX DOCD: CPT | Performed by: OBSTETRICS & GYNECOLOGY

## 2024-03-28 PROCEDURE — 3074F SYST BP LT 130 MM HG: CPT | Performed by: OBSTETRICS & GYNECOLOGY
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
## 2024-03-29 ENCOUNTER — HOSPITAL ENCOUNTER (OUTPATIENT)
Dept: RADIOLOGY | Facility: HOSPITAL | Age: 69
Discharge: HOME | End: 2024-03-29
Payer: MEDICARE

## 2024-03-29 ENCOUNTER — OFFICE VISIT (OUTPATIENT)
Dept: ORTHOPEDIC SURGERY | Facility: CLINIC | Age: 69
End: 2024-03-29
Payer: MEDICARE

## 2024-03-29 ENCOUNTER — HOSPITAL ENCOUNTER (OUTPATIENT)
Dept: RADIOLOGY | Facility: CLINIC | Age: 69
Discharge: HOME | End: 2024-03-29
Payer: MEDICARE

## 2024-03-29 ENCOUNTER — APPOINTMENT (OUTPATIENT)
Dept: RADIOLOGY | Facility: HOSPITAL | Age: 69
End: 2024-03-29
Payer: MEDICARE

## 2024-03-29 ENCOUNTER — APPOINTMENT (OUTPATIENT)
Dept: ORTHOPEDIC SURGERY | Facility: CLINIC | Age: 69
End: 2024-03-29
Payer: MEDICARE

## 2024-03-29 DIAGNOSIS — M25.572 ACUTE LEFT ANKLE PAIN: ICD-10-CM

## 2024-03-29 DIAGNOSIS — M19.079 ARTHRITIS OF ANKLE: Primary | ICD-10-CM

## 2024-03-29 DIAGNOSIS — N95.0 PMB (POSTMENOPAUSAL BLEEDING): ICD-10-CM

## 2024-03-29 PROCEDURE — 1123F ACP DISCUSS/DSCN MKR DOCD: CPT | Performed by: ORTHOPAEDIC SURGERY

## 2024-03-29 PROCEDURE — 99214 OFFICE O/P EST MOD 30 MIN: CPT | Performed by: ORTHOPAEDIC SURGERY

## 2024-03-29 PROCEDURE — 3008F BODY MASS INDEX DOCD: CPT | Performed by: ORTHOPAEDIC SURGERY

## 2024-03-29 PROCEDURE — 76830 TRANSVAGINAL US NON-OB: CPT | Performed by: RADIOLOGY

## 2024-03-29 PROCEDURE — 73610 X-RAY EXAM OF ANKLE: CPT | Mod: LEFT SIDE | Performed by: RADIOLOGY

## 2024-03-29 PROCEDURE — 1036F TOBACCO NON-USER: CPT | Performed by: ORTHOPAEDIC SURGERY

## 2024-03-29 PROCEDURE — 1159F MED LIST DOCD IN RCRD: CPT | Performed by: ORTHOPAEDIC SURGERY

## 2024-03-29 PROCEDURE — 1157F ADVNC CARE PLAN IN RCRD: CPT | Performed by: ORTHOPAEDIC SURGERY

## 2024-03-29 PROCEDURE — 1160F RVW MEDS BY RX/DR IN RCRD: CPT | Performed by: ORTHOPAEDIC SURGERY

## 2024-03-29 PROCEDURE — 76856 US EXAM PELVIC COMPLETE: CPT | Performed by: RADIOLOGY

## 2024-03-29 PROCEDURE — 76856 US EXAM PELVIC COMPLETE: CPT

## 2024-03-29 PROCEDURE — 73610 X-RAY EXAM OF ANKLE: CPT | Mod: LT

## 2024-03-29 NOTE — PROGRESS NOTES
"    History: Delaney Mccann" is here for her left ankle.  She is a long history of left ankle issues including a prior tibia fracture many years ago.  She had a debridement by podiatry last year which helped minimally.  She having persistent pain and has had injections in the past as well.    Past medical history: Multiple  Medications: Multiple  Allergies: No known drug allergies    Please refer to the intake H&P regarding the patient's review of systems, family history and social history as was done today    HEENT: Normal  Lungs: Clear to auscultation  Heart: RRR  Abdomen: Soft, nontender  Skin: clear  Extremity: She has limited dorsiflexion to 4 degrees and plantarflexion to 6 degrees.  Mild pain with palpation around ankle itself.  Mild swelling with no sign of redness or warmth.  She has a bit of a flatfoot as well.  No numbness or tingling today.  Contralateral exam is normal for strength, motion, stability and neurovascular assessment.    Radiographs: AP lateral oblique views of the left ankle show severe degenerative change with bone-on-bone articulation throughout.  Flatfoot deformity also noted on lateral view.    Assessment: End-stage left ankle DJD    Plan: We discussed that cleanouts are not going to help her ankle as she is bone-on-bone throughout the joint.  At this point the only options would include fusion versus arthroplasty.  We are going to refer to the foot and ankle team for further consideration of these options.  She does feels about now she would like to entertain surgical fixation.  She has a boot and a brace to use for support and will ice daily.  All questions were answered today with the patient.    This note was generated with voice recognition software and may contain grammatical errors.  "

## 2024-04-02 ENCOUNTER — TELEPHONE (OUTPATIENT)
Dept: OBSTETRICS AND GYNECOLOGY | Facility: CLINIC | Age: 69
End: 2024-04-02
Payer: MEDICARE

## 2024-04-02 NOTE — TELEPHONE ENCOUNTER
Spoke with patient and made her aware of the results and scheduled for procedure at requested.  Reviewed and approved by GRACIA LUGO on 4/2/24 at 11:22 AM.

## 2024-04-02 NOTE — TELEPHONE ENCOUNTER
----- Message from Ivan Moore DO sent at 4/2/2024 10:00 AM EDT -----  Put her in there for now but the first cancellation you have she needs first priority  ----- Message -----  From: Maru Solis MA  Sent: 4/2/2024   9:46 AM EDT  To: Ivan Moore DO    The soonest you have an apt available is 4/23 at 1 pm - is that okay???  ----- Message -----  From: Ivan Moore DO  Sent: 4/1/2024   6:30 PM EDT  To: Maru Solis MA    Call patient: Endometrium 1.8 cm thick.  Needs office hysteroscopy ASAP

## 2024-04-03 ENCOUNTER — PATIENT OUTREACH (OUTPATIENT)
Dept: PRIMARY CARE | Facility: CLINIC | Age: 69
End: 2024-04-03
Payer: MEDICARE

## 2024-04-03 NOTE — PROGRESS NOTES
Unable to reach patient for one month post discharge follow up call.   LVM with call back number for patient to call if needed   If no voicemail available call attempts x 2 were made to contact the patient to assist with any questions or concerns patient may have.     Hao Jordan LPN

## 2024-04-10 ENCOUNTER — PROCEDURE VISIT (OUTPATIENT)
Dept: OBSTETRICS AND GYNECOLOGY | Facility: CLINIC | Age: 69
End: 2024-04-10
Payer: MEDICARE

## 2024-04-10 VITALS
DIASTOLIC BLOOD PRESSURE: 68 MMHG | BODY MASS INDEX: 23.56 KG/M2 | SYSTOLIC BLOOD PRESSURE: 110 MMHG | HEIGHT: 64 IN | WEIGHT: 138 LBS

## 2024-04-10 DIAGNOSIS — N93.9 ABNORMAL UTERINE BLEEDING (AUB): Primary | ICD-10-CM

## 2024-04-10 PROCEDURE — 88305 TISSUE EXAM BY PATHOLOGIST: CPT | Performed by: PATHOLOGY

## 2024-04-10 PROCEDURE — 58558 HYSTEROSCOPY BIOPSY: CPT | Performed by: OBSTETRICS & GYNECOLOGY

## 2024-04-10 PROCEDURE — 88305 TISSUE EXAM BY PATHOLOGIST: CPT

## 2024-04-10 NOTE — PROGRESS NOTES
"Patient ID: Delaney Ch \"Lee\" is a 68 y.o. female here for office hysteroscopy with endometrial sampling for postmenopausal bleeding and thickened endometrium on ultrasound.    IMAGING RESULTS:    03/29/24 - US PELVIS TRANSABDOMINAL WITH TRANSVAGINAL    - Impression -  1. Abnormal thickened endometrium. Carcinoma can have this  appearance. Follow-up is advised  2. Neither ovary is seen    I personally reviewed the pelvic ultrasound images and my impressions are endometrium measuring 1.8 cm, heterogenous with 2 rounded densities consistent with polyps, ovaries not visualized but no adnexal abnormalities noted, no free fluid.         Procedures    HYSTEROSCOPY WITH POLYPECTOMY OFFICE PROCEDURE NOTE    Patient's pre-procedure questions were answered and a written informed consent form was signed.   Patient was placed in lithotomy position on the procedure room table.  A speculum was placed in the vagina. The cervix was cleansed × 3 with Betadine.  A single-tooth tenaculum was placed on the anterior lip of the cervix.  A paracervical block of 10 mL of 0.25% marcaine was placed.   Dilatation of the cervix was needed to be able to pass the hysteroscope.  The [facility] hysteroscope was then inserted into the endometrial cavity, normal saline was used for the hysteroscopic fluid medium.  The cavity distended well with evidence of endometrial polyps.  The background endometrium appeared normal/fairly atrophic.  The resectoscope was placed through the hysteroscope and the polyps were then removed, as was background endometrial tissue. Procedure was then ended, the instruments removed from the uterus and vagina. The tenaculum site was hemostatic. The patient tolerated the procedure well.  Blood loss was minimal.      Problem List Items Addressed This Visit    None  Visit Diagnoses       Abnormal uterine bleeding (AUB)    -  Primary          Thickened endometrium:  Noted on workup for 1 episode of postmenopausal spotting, " now resolved.  Differential diagnosis discussed with patient--endometrial polyps, endometrial hyperplasia, or endometrial cancer.  Patient without any risk factors for endometrial hyperplasia or cancer.  US with thickened endometrium measuring 1.8 cm with questionable polyps.  Hysteroscopic polypectomy done today without complication, postprocedure instructions reviewed.        Will call with PATH results.      Ivan Moore DO

## 2024-04-17 LAB
LABORATORY COMMENT REPORT: NORMAL
PATH REPORT.FINAL DX SPEC: NORMAL
PATH REPORT.GROSS SPEC: NORMAL
PATH REPORT.RELEVANT HX SPEC: NORMAL
PATH REPORT.TOTAL CANCER: NORMAL
RESIDENT REVIEW: NORMAL

## 2024-04-23 ENCOUNTER — APPOINTMENT (OUTPATIENT)
Dept: OBSTETRICS AND GYNECOLOGY | Facility: CLINIC | Age: 69
End: 2024-04-23
Payer: MEDICARE

## 2024-05-02 ENCOUNTER — OFFICE VISIT (OUTPATIENT)
Dept: ORTHOPEDIC SURGERY | Facility: CLINIC | Age: 69
End: 2024-05-02
Payer: MEDICARE

## 2024-05-02 VITALS — BODY MASS INDEX: 22.81 KG/M2 | WEIGHT: 133.6 LBS | HEIGHT: 64 IN

## 2024-05-02 DIAGNOSIS — M12.579 TRAUMATIC ARTHRITIS OF FOOT, UNSPECIFIED LATERALITY: Primary | ICD-10-CM

## 2024-05-02 DIAGNOSIS — M12.579 TRAUMATIC ARTHRITIS OF ANKLE, UNSPECIFIED LATERALITY: ICD-10-CM

## 2024-05-02 PROCEDURE — 99214 OFFICE O/P EST MOD 30 MIN: CPT | Performed by: ORTHOPAEDIC SURGERY

## 2024-05-02 PROCEDURE — 1159F MED LIST DOCD IN RCRD: CPT | Performed by: ORTHOPAEDIC SURGERY

## 2024-05-02 PROCEDURE — 1160F RVW MEDS BY RX/DR IN RCRD: CPT | Performed by: ORTHOPAEDIC SURGERY

## 2024-05-02 PROCEDURE — 1157F ADVNC CARE PLAN IN RCRD: CPT | Performed by: ORTHOPAEDIC SURGERY

## 2024-05-02 PROCEDURE — 1125F AMNT PAIN NOTED PAIN PRSNT: CPT | Performed by: ORTHOPAEDIC SURGERY

## 2024-05-02 PROCEDURE — 3008F BODY MASS INDEX DOCD: CPT | Performed by: ORTHOPAEDIC SURGERY

## 2024-05-02 PROCEDURE — 1123F ACP DISCUSS/DSCN MKR DOCD: CPT | Performed by: ORTHOPAEDIC SURGERY

## 2024-05-02 PROCEDURE — 1036F TOBACCO NON-USER: CPT | Performed by: ORTHOPAEDIC SURGERY

## 2024-05-02 RX ORDER — MELOXICAM 7.5 MG/1
7.5 TABLET ORAL ONCE
Status: CANCELLED | OUTPATIENT
Start: 2024-05-02 | End: 2024-05-02

## 2024-05-02 RX ORDER — ACETAMINOPHEN 325 MG/1
975 TABLET ORAL ONCE
Status: CANCELLED | OUTPATIENT
Start: 2024-05-02 | End: 2024-05-02

## 2024-05-02 RX ORDER — PREGABALIN 75 MG/1
75 CAPSULE ORAL ONCE
Status: CANCELLED | OUTPATIENT
Start: 2024-05-02 | End: 2024-05-02

## 2024-05-02 RX ORDER — SODIUM CHLORIDE, SODIUM LACTATE, POTASSIUM CHLORIDE, CALCIUM CHLORIDE 600; 310; 30; 20 MG/100ML; MG/100ML; MG/100ML; MG/100ML
100 INJECTION, SOLUTION INTRAVENOUS CONTINUOUS
Status: CANCELLED | OUTPATIENT
Start: 2024-05-02

## 2024-05-02 RX ORDER — CEFAZOLIN SODIUM 2 G/100ML
2 INJECTION, SOLUTION INTRAVENOUS ONCE
Status: CANCELLED | OUTPATIENT
Start: 2024-05-02 | End: 2024-05-02

## 2024-05-02 ASSESSMENT — PAIN DESCRIPTION - DESCRIPTORS: DESCRIPTORS: SHARP

## 2024-05-02 ASSESSMENT — PAIN - FUNCTIONAL ASSESSMENT: PAIN_FUNCTIONAL_ASSESSMENT: 0-10

## 2024-05-02 ASSESSMENT — PAIN SCALES - GENERAL: PAINLEVEL_OUTOF10: 8

## 2024-05-02 NOTE — PROGRESS NOTES
68-year-old female here for left ankle pain.  A long history of issues with the left leg.  Had remote tib-fib fracture fixed with surgery at age 15.  Ultimately had hardware removal.  Pain increased over the last few years resulting in podiatric surgery with spur removal with no significant relief of pain.  Now has pain with daily activity.  Works on her feet as a dentist.  Would like to consider surgical options.  No advanced imaging.    On exam:  WD/WN thin female  A+O X3  NAD  No lymphedema  Inspection of both feet and ankles show symmetric arches.   Tender across left anterior ankle capsule.  Longitudinal scar over medial gutter and anterior medial tibia.  5/5 strength in all 4 planes.   Sensation intact to LT.   Good pulses.   Stable anterior drawer.  No peroneal subluxation.    (-) Susanne.     I personally reviewed the following radiographic exams: X-ray of left ankle shows end-stage tibiotalar arthritis.  Mild narrowing of the subtalar joint without significant change from previous films.    Assessment: End-stage posttraumatic arthritis left ankle.   ?Subtalar arthritis.    Plan: Discussed nonoperative and operative options in detail.   Risk and benefits discussed in detail. All questions answered today.  Recovery timeline and expectations discussed in detail.  Discussed ankle replacement.  Discussed postop recovery.  Would recommend advanced imaging with CT scan for evaluation of subtalar joint.  Can go ahead and schedule ankle replacement as long as it is after the CT scan review.  Left total ANkle Replacement [id number] . Anesthesia: Regional and consult. DJO STAR with C-arm. Kefzol 2 gm. TXA 1 gm.  75 minutes  Crutches or Walker/ Consider knee scooter

## 2024-05-03 ENCOUNTER — HOSPITAL ENCOUNTER (OUTPATIENT)
Dept: RADIOLOGY | Facility: CLINIC | Age: 69
Discharge: HOME | End: 2024-05-03
Payer: MEDICARE

## 2024-05-03 ENCOUNTER — TELEPHONE (OUTPATIENT)
Dept: ORTHOPEDIC SURGERY | Facility: CLINIC | Age: 69
End: 2024-05-03
Payer: MEDICARE

## 2024-05-03 DIAGNOSIS — M12.579 TRAUMATIC ARTHRITIS OF FOOT, UNSPECIFIED LATERALITY: ICD-10-CM

## 2024-05-03 PROCEDURE — 73700 CT LOWER EXTREMITY W/O DYE: CPT | Mod: LT

## 2024-05-03 PROCEDURE — 73700 CT LOWER EXTREMITY W/O DYE: CPT | Mod: LEFT SIDE | Performed by: RADIOLOGY

## 2024-05-03 NOTE — TELEPHONE ENCOUNTER
Pt. Called to request getting her PAT completed at another location. I explained to her that she would need to get Medical Clearance from her PCP, and her Cardiologist before proceeding with Surgery, and she Understood.

## 2024-05-07 ENCOUNTER — TELEMEDICINE CLINICAL SUPPORT (OUTPATIENT)
Dept: PREADMISSION TESTING | Facility: HOSPITAL | Age: 69
End: 2024-05-07
Payer: MEDICARE

## 2024-05-07 DIAGNOSIS — M12.579 TRAUMATIC ARTHRITIS OF FOOT, UNSPECIFIED LATERALITY: ICD-10-CM

## 2024-05-08 ENCOUNTER — OFFICE VISIT (OUTPATIENT)
Dept: CARDIOLOGY | Facility: CLINIC | Age: 69
End: 2024-05-08
Payer: MEDICARE

## 2024-05-08 VITALS
WEIGHT: 133.9 LBS | HEART RATE: 91 BPM | DIASTOLIC BLOOD PRESSURE: 60 MMHG | BODY MASS INDEX: 22.98 KG/M2 | SYSTOLIC BLOOD PRESSURE: 114 MMHG

## 2024-05-08 DIAGNOSIS — Z78.9 NEVER SMOKED CIGARETTES: ICD-10-CM

## 2024-05-08 DIAGNOSIS — E03.9 HYPOTHYROIDISM, ADULT: ICD-10-CM

## 2024-05-08 DIAGNOSIS — Z01.818 PRE-OPERATIVE CLEARANCE: ICD-10-CM

## 2024-05-08 DIAGNOSIS — I10 ESSENTIAL HYPERTENSION: ICD-10-CM

## 2024-05-08 DIAGNOSIS — Z01.810 PREOP CARDIOVASCULAR EXAM: ICD-10-CM

## 2024-05-08 DIAGNOSIS — R55 SYNCOPE AND COLLAPSE: ICD-10-CM

## 2024-05-08 DIAGNOSIS — I48.11 LONGSTANDING PERSISTENT ATRIAL FIBRILLATION (MULTI): ICD-10-CM

## 2024-05-08 PROCEDURE — 1157F ADVNC CARE PLAN IN RCRD: CPT | Performed by: INTERNAL MEDICINE

## 2024-05-08 PROCEDURE — 3074F SYST BP LT 130 MM HG: CPT | Performed by: INTERNAL MEDICINE

## 2024-05-08 PROCEDURE — 3008F BODY MASS INDEX DOCD: CPT | Performed by: INTERNAL MEDICINE

## 2024-05-08 PROCEDURE — 1159F MED LIST DOCD IN RCRD: CPT | Performed by: INTERNAL MEDICINE

## 2024-05-08 PROCEDURE — 99214 OFFICE O/P EST MOD 30 MIN: CPT | Performed by: INTERNAL MEDICINE

## 2024-05-08 PROCEDURE — 1036F TOBACCO NON-USER: CPT | Performed by: INTERNAL MEDICINE

## 2024-05-08 PROCEDURE — 3078F DIAST BP <80 MM HG: CPT | Performed by: INTERNAL MEDICINE

## 2024-05-08 PROCEDURE — 93000 ELECTROCARDIOGRAM COMPLETE: CPT | Performed by: INTERNAL MEDICINE

## 2024-05-08 PROCEDURE — 1123F ACP DISCUSS/DSCN MKR DOCD: CPT | Performed by: INTERNAL MEDICINE

## 2024-05-08 PROCEDURE — 1160F RVW MEDS BY RX/DR IN RCRD: CPT | Performed by: INTERNAL MEDICINE

## 2024-05-08 RX ORDER — ENOXAPARIN SODIUM 100 MG/ML
INJECTION SUBCUTANEOUS
Qty: 1 EACH | Refills: 0 | Status: SHIPPED | OUTPATIENT
Start: 2024-05-08

## 2024-05-08 NOTE — PROGRESS NOTES
CARDIOLOGY OFFICE VISIT      CHIEF COMPLAINT      HISTORY OF PRESENT ILLNESS  The patient states she feels well.  She states she is feeling to have surgery on her left ankle later this month at [facility].  She denies chest discomfort or symptoms of myocardial ischemia.  She denies dyspnea on exertion.  She denies presyncope and syncope.  She denies any problem with bleeding.  She does have an EKG from February 29 of this year which demonstrates atrial fibrillation.  Her EKG today demonstrates atrial fibrillation, ventricular sponsor 91 bpm, left axis deviation, low voltage QRS, poor R wave progression, no acute changes.  I did discuss this with her.  She has had surgeries in the past without any problems from a cardiac or anesthesia standpoint.  I did recommend that she hold her Eliquis for 3 days prior to her upcoming surgery.  Her GFR is 90.  I did recommend 1 dose of Lovenox in the morning prior to surgery.    IMPRESSION:   1. History of syncopal episodes, most likely vasovagal.  2. Essential hypertension.  3. Longstanding persistent atrial fibrillation, recurrent atrial fibrillation since 1/1/2024  4. Raynaud's  5. Hypothyroidism on replacement therapy    Cardiac status is stable at this time.  The patient is a satisfactory risk from a cardiac standpoint for her upcoming orthopedic procedure, assuming her routine preop lab work is satisfactory.  I did discuss with her about stopping fish oil and how to handle her Eliquis and Lovenox as described above.     Please excuse any errors in grammar or translation related to this dictation. Voice recognition software was utilized to prepare this document.            Past Medical History  Past Medical History:   Diagnosis Date    A-fib (Multi)     s/p 1/29/24 Cardioversion    Abnormal screening cardiac CT     6/20:1. Coronary artery calcium score of 137*. (LAD, LCX)     2. 77th percentile** for age, gender in asymptomatic patients.     *Coronary Artery  Agatston score     Anticoagulated     Eliquis    Anxiety     Arrhythmia     Cardiology follow-up encounter     Russell Palma MD- has APT 5/8/2024    Closed fracture of sternal end of clavicle 11/04/2022    Dislocation of acromioclavicular joint 11/04/2022    H/O echocardiogram     6/21: CONCLUSIONS:     1. The left ventricular systolic function is normal with a 60-65% estimated ejection     fraction.     2. Spectral Doppler shows an impaired relaxation pattern of left ventricular diastolic filling     Mild MR/TR    History of Holter monitoring     1/19: Underlying AFIB with RVR     5/21: NSR with occ isolated PVCs (when had sx)    History of nuclear stress test     9/12: normal    Hypertension     Hypothyroid     Palpitations 12/26/2018    Poor balance of body weight 02/29/2024    Raynaud's disease     Shortness of breath at rest 02/29/2024    Shoulder pain 02/29/2024    Syncope and collapse     Traumatic arthritis of foot, unspecified laterality        Social History  Social History     Tobacco Use    Smoking status: Never    Smokeless tobacco: Never   Vaping Use    Vaping status: Never Used   Substance Use Topics    Alcohol use: Yes     Alcohol/week: 3.0 standard drinks of alcohol     Types: 3 Glasses of wine per week     Comment: 3 glasses of wine/ wk    Drug use: Never       Family History     Family History   Problem Relation Name Age of Onset    Osteoporotic fracture Mother      Other (carotid artery stenosis) Father Ray     Coronary artery disease Father Ray     Other (coronary artery bypass graft) Father Ray     Peripheral vascular disease Father Ray     Abnormal EKG Father Ray     Angina Father Ray     Atrial fibrillation Father Ray     Atrial fibrillation Other          Allergies:  Allergies   Allergen Reactions    Demerol [Meperidine] Anaphylaxis    Alendronate Myalgia    Cysteine Other     sores on legs    Lisinopril Dizziness    Nifedipine Other     cause AE (cold)    Rivaroxaban Other     cause AE (cold)     Rosuvastatin Myalgia        Outpatient Medications:  Current Outpatient Medications   Medication Instructions    calcium carbonate 215 mg calcium (500 mg) tablet,chewable 1 tablet, oral, Daily PRN    cholecalciferol (Vitamin D-3) 5,000 Units tablet 1 tablet, oral, Daily, 1 tablet daily    dietary supplement capsule 1 Dose, oral, Daily, Beet supplement     Eliquis 5 mg, oral, 2 times daily    ferrous sulfate (325 mg ferrous sulfate) 325 mg, oral, 2 times weekly    flecainide (TAMBOCOR) 100 mg, oral, 2 times daily    levothyroxine (SYNTHROID, LEVOXYL) 75 mcg, oral, Daily before breakfast    multivitamin tablet 1 tablet, oral, Daily    omega 3-dha-epa-fish oil (Fish OiL) 1,000 mg (120 mg-180 mg) capsule 1 capsule, oral, Daily    Prolia 60 mg, subcutaneous, Every 6 months    RED BEET ROOT-SOUR CHERRY EXT ORAL 1 tablet, oral, Daily    red yeast rice 600 mg capsule 1 capsule, oral, Daily    vitamins A,C,E-zinc-copper (PreserVision AREDS) 2,148 mcg-113 mg-45 mg-17.4mg tablet 1 tablet, oral, Daily          REVIEW OF SYSTEMS  Review of Systems   All other systems reviewed and are negative.        VITALS  Vitals:    05/08/24 0751   BP: 114/60   Pulse: 91       PHYSICAL EXAM  Vitals and nursing note reviewed.   Constitutional:       Appearance: Healthy appearance.   Eyes:      Conjunctiva/sclera: Conjunctivae normal.      Pupils: Pupils are equal, round, and reactive to light.   Pulmonary:      Effort: Pulmonary effort is normal.      Breath sounds: Normal breath sounds.   Cardiovascular:      PMI at left midclavicular line. Normal rate. Regular rhythm.      Murmurs: There is no murmur.      No gallop.  No click. No rub.   Pulses:     Intact distal pulses.   Edema:     Peripheral edema absent.   Musculoskeletal: Normal range of motion. Skin:     General: Skin is warm and dry.   Neurological:      Mental Status: Alert and oriented to person, place and time.           ASSESSMENT AND PLAN  Diagnoses and all orders for this  visit:  Preop cardiovascular exam  Syncope and collapse  Essential hypertension  Longstanding persistent atrial fibrillation (Multi)  Hypothyroidism, adult  Pre-operative clearance  BMI 22.0-22.9, adult  Never smoked cigarettes      [unfilled]

## 2024-05-08 NOTE — PATIENT INSTRUCTIONS
Patient is an acceptable cardiac risk for upcoming surgery.  Hold fish oil for 1 week prior to surgery.  Hold Eliquis 3 days prior to surgery and resume following.  Lovenox 60 mg sub-q in the morning the day prior to surgery    Follow up office visit in 1 year.    Continue same medications/treatment.  Patient educated on proper medication use.  Patient educated on risk factor modification.  Please bring any lab results from other providers / physicians to your next appointment.    Please bring all medicines, vitamins and herbal supplements with you when you come to the office.    Prescriptions will not be filled unless you are compliant with your follow up appointments or have a follow up  appointment scheduled as per instruction of your physician.  Refills should be requested at the time of  Your visit.    Clare AMAYA LPN, am scribing for and in the presence of Dr. Russell Palma MD, FACC

## 2024-05-09 ENCOUNTER — PRE-ADMISSION TESTING (OUTPATIENT)
Dept: PREADMISSION TESTING | Facility: HOSPITAL | Age: 69
End: 2024-05-09
Payer: MEDICARE

## 2024-05-09 ENCOUNTER — LAB (OUTPATIENT)
Dept: LAB | Facility: LAB | Age: 69
End: 2024-05-09
Payer: MEDICARE

## 2024-05-09 VITALS
HEART RATE: 83 BPM | HEIGHT: 64 IN | SYSTOLIC BLOOD PRESSURE: 117 MMHG | DIASTOLIC BLOOD PRESSURE: 87 MMHG | RESPIRATION RATE: 16 BRPM | BODY MASS INDEX: 21.83 KG/M2 | OXYGEN SATURATION: 99 % | TEMPERATURE: 97.9 F | WEIGHT: 127.87 LBS

## 2024-05-09 DIAGNOSIS — R73.9 ELEVATED BLOOD SUGAR: ICD-10-CM

## 2024-05-09 DIAGNOSIS — Z01.818 PREOP TESTING: ICD-10-CM

## 2024-05-09 DIAGNOSIS — Z01.818 PREOP TESTING: Primary | ICD-10-CM

## 2024-05-09 LAB
ANION GAP SERPL CALC-SCNC: 17 MMOL/L (ref 10–20)
BASOPHILS # BLD AUTO: 0.04 X10*3/UL (ref 0–0.1)
BASOPHILS NFR BLD AUTO: 0.5 %
BUN SERPL-MCNC: 11 MG/DL (ref 6–23)
CALCIUM SERPL-MCNC: 9.1 MG/DL (ref 8.6–10.3)
CHLORIDE SERPL-SCNC: 93 MMOL/L (ref 98–107)
CO2 SERPL-SCNC: 24 MMOL/L (ref 21–32)
CREAT SERPL-MCNC: 0.6 MG/DL (ref 0.5–1.05)
EGFRCR SERPLBLD CKD-EPI 2021: >90 ML/MIN/1.73M*2
EOSINOPHIL # BLD AUTO: 0.05 X10*3/UL (ref 0–0.7)
EOSINOPHIL NFR BLD AUTO: 0.6 %
ERYTHROCYTE [DISTWIDTH] IN BLOOD BY AUTOMATED COUNT: 13.5 % (ref 11.5–14.5)
EST. AVERAGE GLUCOSE BLD GHB EST-MCNC: 65 MG/DL
GLUCOSE SERPL-MCNC: 66 MG/DL (ref 74–99)
HBA1C MFR BLD: 3.9 %
HCT VFR BLD AUTO: 41.9 % (ref 36–46)
HGB BLD-MCNC: 14.6 G/DL (ref 12–16)
IMM GRANULOCYTES # BLD AUTO: 0.02 X10*3/UL (ref 0–0.7)
IMM GRANULOCYTES NFR BLD AUTO: 0.2 % (ref 0–0.9)
LYMPHOCYTES # BLD AUTO: 1.71 X10*3/UL (ref 1.2–4.8)
LYMPHOCYTES NFR BLD AUTO: 19.8 %
MCH RBC QN AUTO: 33.5 PG (ref 26–34)
MCHC RBC AUTO-ENTMCNC: 34.8 G/DL (ref 32–36)
MCV RBC AUTO: 96 FL (ref 80–100)
MONOCYTES # BLD AUTO: 0.79 X10*3/UL (ref 0.1–1)
MONOCYTES NFR BLD AUTO: 9.2 %
NEUTROPHILS # BLD AUTO: 6.02 X10*3/UL (ref 1.2–7.7)
NEUTROPHILS NFR BLD AUTO: 69.7 %
NRBC BLD-RTO: 0 /100 WBCS (ref 0–0)
PLATELET # BLD AUTO: 315 X10*3/UL (ref 150–450)
POTASSIUM SERPL-SCNC: 4.7 MMOL/L (ref 3.5–5.3)
RBC # BLD AUTO: 4.36 X10*6/UL (ref 4–5.2)
SODIUM SERPL-SCNC: 129 MMOL/L (ref 136–145)
WBC # BLD AUTO: 8.6 X10*3/UL (ref 4.4–11.3)

## 2024-05-09 PROCEDURE — 85025 COMPLETE CBC W/AUTO DIFF WBC: CPT

## 2024-05-09 PROCEDURE — 99203 OFFICE O/P NEW LOW 30 MIN: CPT | Performed by: PHYSICIAN ASSISTANT

## 2024-05-09 PROCEDURE — 83036 HEMOGLOBIN GLYCOSYLATED A1C: CPT

## 2024-05-09 PROCEDURE — 87081 CULTURE SCREEN ONLY: CPT | Mod: AHULAB | Performed by: ORTHOPAEDIC SURGERY

## 2024-05-09 PROCEDURE — 80048 BASIC METABOLIC PNL TOTAL CA: CPT

## 2024-05-09 PROCEDURE — 36415 COLL VENOUS BLD VENIPUNCTURE: CPT

## 2024-05-09 RX ORDER — SODIUM CHLORIDE 1000 MG
1 TABLET, SOLUBLE MISCELLANEOUS DAILY
COMMUNITY

## 2024-05-09 RX ORDER — ALPRAZOLAM 0.5 MG/1
0.5 TABLET, EXTENDED RELEASE ORAL AS NEEDED
COMMUNITY

## 2024-05-09 RX ORDER — CHLORHEXIDINE GLUCONATE ORAL RINSE 1.2 MG/ML
SOLUTION DENTAL
Qty: 475 ML | Refills: 0 | Status: SHIPPED | OUTPATIENT
Start: 2024-05-09

## 2024-05-09 ASSESSMENT — ENCOUNTER SYMPTOMS
WHEEZING: 0
TROUBLE SWALLOWING: 0
ABDOMINAL PAIN: 0
EYE DISCHARGE: 0
DOUBLE VISION: 0
EYE PAIN: 0
SINUS CONGESTION: 0
PALPITATIONS: 0
ABDOMINAL DISTENTION: 0
MYALGIAS: 0
BRUISES/BLEEDS EASILY: 1
SKIN CHANGES: 0
HEMOPTYSIS: 0
VOMITING: 0
ARTHRALGIAS: 1
DYSPNEA WITH EXERTION: 0
FEVER: 0
NAUSEA: 0
NECK PAIN: 0
NECK STIFFNESS: 0
BLOOD IN STOOL: 0
CHILLS: 0
EXCESSIVE BLEEDING: 0
WEAKNESS: 0
VISUAL CHANGE: 0
RHINORRHEA: 0
DIARRHEA: 0
COUGH: 0
DYSPNEA AT REST: 0
NUMBNESS: 0
LIGHT-HEADEDNESS: 0
CONFUSION: 0
WOUND: 0
UNEXPECTED WEIGHT CHANGE: 0
LIMITED RANGE OF MOTION: 0
DYSURIA: 0
DIFFICULTY URINATING: 0
SHORTNESS OF BREATH: 0
CONSTIPATION: 0

## 2024-05-09 NOTE — PREPROCEDURE INSTRUCTIONS
Medication List            Accurate as of May 9, 2024  7:52 AM. Always use your most recent med list.                calcium carbonate 215 mg calcium (500 mg) tablet,chewable  Medication Adjustments for Surgery: Continue until night before surgery     chlorhexidine 0.12 % solution  Commonly known as: Peridex  Swish for 30 seconds and spit 15mL of solution the night before and morning of surgery     cholecalciferol 5,000 Units tablet  Commonly known as: Vitamin D-3  Medication Adjustments for Surgery: Continue until night before surgery     dietary supplement capsule  Medication Adjustments for Surgery: Stop 7 days before surgery     Eliquis 5 mg tablet  Generic drug: apixaban  Medication Adjustments for Surgery: Stop 3 days before surgery     enoxaparin 60 mg/0.6 mL syringe  Commonly known as: Lovenox  Inject 1 dose sub-q in the morning only the day prior to surgery  Notes to patient: Take before 7am on 5/20/24     ferrous sulfate (325 mg ferrous sulfate) tablet  Medication Adjustments for Surgery: Continue until night before surgery     Fish OiL 1,000 mg (120 mg-180 mg) capsule  Generic drug: omega 3-dha-epa-fish oil  Medication Adjustments for Surgery: Stop 7 days before surgery     flecainide 100 mg tablet  Commonly known as: Tambocor  Take 1 tablet (100 mg) by mouth 2 times a day.  Medication Adjustments for Surgery: Take morning of surgery with sip of water, no other fluids     levothyroxine 75 mcg tablet  Commonly known as: Synthroid, Levoxyl  Take 1 tablet (75 mcg) by mouth once daily in the morning. Take before meals.  Medication Adjustments for Surgery: Take morning of surgery with sip of water, no other fluids     multivitamin tablet  Medication Adjustments for Surgery: Stop 7 days before surgery     PreserVision AREDS 2,148 mcg-113 mg-45 mg-17.4mg tablet  Generic drug: vitamins A,C,E-zinc-copper  Medication Adjustments for Surgery: Stop 7 days before surgery     Prolia 60 mg/mL syringe  Generic drug:  denosumab  Inject 1 mL (60 mg total) under the skin every 6 months.  Medication Adjustments for Surgery: Continue until night before surgery     RED BEET ROOT-SOUR CHERRY EXT ORAL  Medication Adjustments for Surgery: Stop 7 days before surgery     red yeast rice 600 mg capsule  Medication Adjustments for Surgery: Stop 7 days before surgery                          **Concerning above medication instructions, if medication is normally taken at night, continue normal schedule.**  **DO NOT TAKE NIGHT PRIOR AND MORNING OF SURGERY**    CONTACT SURGEON'S OFFICE IF YOU DEVELOP:  * Fever = 100.4 F   * New respiratory symptoms (e.g. cough, shortness of breath, respiratory distress, sore throat)  * Recent loss of taste or smell  *Flu like symptoms such as headache, fatigue or gastrointestinal symptoms  * You develop any open sores, shingles, burning or painful urination   AND/OR:  * You no longer wish to have the surgery.  * Any other personal circumstances change that may lead to the need to cancel or defer this surgery.  *You were admitted to any hospital within one week of your planned procedure.    SMOKING:  *Quitting smoking can make a huge difference to your health and recovery from surgery.    *If you need help with quitting, call 4-560-QUIT-NOW.    THE DAY BEFORE SURGERY:  *Do not eat any food after midnight the night before surgery.   *You are permitted to drink clear liquids (i.e. water, black coffee (no milk or cream), tea, apple juice and electrolyte drinks (gatorade)) up to 13.5 ounces, up to 2 hours before your arrival time.  *You may chew gum until 2 hours before your surgery    SURGICAL TIME  *You will be contacted between 2 p.m. and 6 p.m. the business day before your surgery with your arrival time.  *If you haven't received a call by 6pm, call [phone].  *Scheduled surgery times may change and you will be notified if this occurs-check your personal voicemail for any updates.    ON THE MORNING OF  SURGERY:  *Wear comfortable, loose fitting clothing.   *Do not use moisturizers, creams, lotions or perfume.  *All jewelry and valuables should be left at home.  *Prosthetic devices such as contact lenses, hearing aids, dentures, eyelash extensions, hairpins and body piercing must be removed before surgery.    BRING WITH YOU:  *Photo ID and insurance card  *Current list of medicines and allergies  *Pacemaker/Defibrillator/Heart stent cards  *CPAP machine and mask  *Slings/splints/crutches  *Copy of your complete Advanced Directive/DHPOA-if applicable  *Neurostimulator implant remote    PARKING AND ARRIVAL:  *Check in at the Main Entrance desk and let them know you are here for surgery.  *You will be directed to the 2nd floor surgical waiting area.    AFTER OUTPATIENT SURGERY:  *A responsible adult MUST accompany you at the time of discharge and stay with you for 24 hours after your surgery.  *You may NOT drive yourself home after surgery.  *You may use a taxi or ride sharing service ([employer], [employer]) to return home ONLY if you are accompanied by a friend or family member.  *Instructions for resuming your medications will be provided by your surgeon.

## 2024-05-09 NOTE — H&P (VIEW-ONLY)
"[facility]/PAT Evaluation       Name: Delaney Ch (Delaney Ch \"Pegg\")  /Age: 1955/68 y.o.       Date of Consult: 24     Referring Provider: Dr. Ferguson    Surgery, Date, and Length: Left Ankle Arthroplasty - Left , 24, 120MIN    Delaney Ch is a 68 year-old female who presents to the [facility] for perioperative risk assessment prior to surgery.    Patient presents with a primary diagnosis of left ankle pain. Pt notes injury over 12 months ago she was caring for her mother's dog when the leash became wrapped around her ankle. This caused a fall.  She fractured her clavicle and left ankle.  She has been to see several ortho docs since the injury.  Of note, she also suffered crush injury at age 15 which required hardware for repair.  Pt refers pain is severe with weight bearing.  She uses ice which helps.  She is unable to take NSAIDs due to current anticoag therapy.  Tylenol is not helpful.  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 She wishes to proceed with surgery as planned.      This note was created in part upon personal review of patient's medical records.      Patient is scheduled to have Left Ankle Arthroplasty - Left       Pt denies any past history of anesthetic complications such as PONV, awareness, prolonged sedation, dental damage, aspiration, cardiac arrest, difficult intubation, difficult I.V. access or unexpected hospital admissions.  NO malignant hyperthermia and or pseudocholinesterase deficiency.  No history of blood transfusions     The patient is not a Buddhist and will accept blood and blood products if medically indicated.   Type and screen NOT sent.     Past Medical History:   Diagnosis Date    A-fib (Multi)     s/p 24 Cardioversion    Abnormal screening cardiac CT     :1. Coronary artery calcium score of 137*. (LAD, LCX)     2. 77th percentile** for age, gender in asymptomatic patients.     *Coronary Artery  Agatston score    Anticoagulated     " Eliquis    Anxiety     Arrhythmia     Cardiology follow-up encounter     Russell Palma MD- has APT 5/8/2024    Closed fracture of sternal end of clavicle 11/04/2022    Dislocation of acromioclavicular joint 11/04/2022    H/O echocardiogram     6/21: CONCLUSIONS:     1. The left ventricular systolic function is normal with a 60-65% estimated ejection     fraction.     2. Spectral Doppler shows an impaired relaxation pattern of left ventricular diastolic filling     Mild MR/TR    History of Holter monitoring     1/19: Underlying AFIB with RVR     5/21: NSR with occ isolated PVCs (when had sx)    History of nuclear stress test     9/12: normal    Hypertension     Hypothyroid     Palpitations 12/26/2018    Poor balance of body weight 02/29/2024    Raynaud's disease     Shortness of breath at rest 02/29/2024    Shoulder pain 02/29/2024    Syncope and collapse     Traumatic arthritis of foot, unspecified laterality        Past Surgical History:   Procedure Laterality Date    ANKLE SURGERY  07/14/2023    1. LEFT ANKLE ARTHROTOMY WITH SYNEVECTOMY/ LET PARTIAL TIBIA EXCISION/ LEFT SUBTALAR JOINT INJECTION WITH C-ARM    BACK SURGERY      L2-S1 discectomy and fusio    BELT ABDOMINOPLASTY      Abdominoplasty    BUNIONECTOMY      Bunionectomy    CARDIAC CATHETERIZATION  02/2022    Cardiac catheterization    CARDIOVERSION  02/2022    Cardioversion    CATARACT EXTRACTION      Cataract surgery    COLONOSCOPY  12/2019 12/19: diverticulosis, 2mm polyp (TA)     7/19/06: Impression:Hyperplastic polyp sigmoid-removed.  Normal rectum. Normal      cecum. Normal ascending colon and ileocecal valve. A single diminutive      polyp found in the sigmoid; removed by cold biopsy polypectomy.    ESOPHAGOGASTRODUODENOSCOPY  10/08/2021    10/8/21: - Normal examined duodenum. - Erythematous mucosa in the antrum. Biopsied.- 3 cm hiatal hernia.    HIP SURGERY      Right DEBORAH x2    TIBIA FRACTURE SURGERY      left tib/fib ORIF    TOTAL  KNEE ARTHROPLASTY      3/21,  (bilateral)    TUBAL LIGATION      Tubal ligation    WRIST SURGERY      Wrist surgery       Patient  reports being sexually active and has had partner(s) who are male. She reports using the following method of birth control/protection: Post-menopausal.    Family History   Problem Relation Name Age of Onset    Osteoporotic fracture Mother      Other (carotid artery stenosis) Father Ray     Coronary artery disease Father Ray     Other (coronary artery bypass graft) Father Ray     Peripheral vascular disease Father Ray     Abnormal EKG Father Ray     Angina Father Ray     Atrial fibrillation Father Ray     Atrial fibrillation Other       Social History     Socioeconomic History    Marital status:      Spouse name: Not on file    Number of children: Not on file    Years of education: Not on file    Highest education level: Not on file   Occupational History    Occupation: dentist     Comment: office in [place]   Tobacco Use    Smoking status: Never    Smokeless tobacco: Never   Vaping Use    Vaping status: Never Used   Substance and Sexual Activity    Alcohol use: Yes     Alcohol/week: 3.0 standard drinks of alcohol     Types: 3 Glasses of wine per week     Comment: 3 glasses of wine/ wk    Drug use: Never    Sexual activity: Yes     Partners: Male     Birth control/protection: Post-menopausal   Other Topics Concern    Not on file   Social History Narrative    Family History:        F:  Hypertension, CAD, COPD, AFIB ( age 86)        M:  OP/fractured femur ( in 3/24 at age 91)        B:  AFIB        S:  Syncope        Social History:     but has SO    (mom lives with her)     2 kids     Works/Dentist [address]    Nonsmoker    ETOH socially     Social Determinants of Health     Financial Resource Strain: Low Risk  (3/1/2024)    Overall Financial Resource Strain (CARDIA)     Difficulty of Paying Living Expenses: Not hard at all   Food Insecurity: Not on file    Transportation Needs: No Transportation Needs (3/1/2024)    PRAPARE - Transportation     Lack of Transportation (Medical): No     Lack of Transportation (Non-Medical): No   Physical Activity: Not on file   Stress: Not on file   Social Connections: Not on file   Intimate Partner Violence: Not on file   Housing Stability: Low Risk  (3/1/2024)    Housing Stability Vital Sign     Unable to Pay for Housing in the Last Year: No     Number of Places Lived in the Last Year: 1     Unstable Housing in the Last Year: No        Allergies   Allergen Reactions    Demerol [Meperidine] Anaphylaxis    Alendronate Myalgia    Cysteine Other     sores on legs    Lisinopril Dizziness    Nifedipine Other     cause AE (cold)    Rivaroxaban Other     cause AE (cold)    Rosuvastatin Myalgia       Current Outpatient Medications:     calcium carbonate 215 mg calcium (500 mg) tablet,chewable, Chew 1 tablet once daily as needed for indigestion or heartburn., Disp: , Rfl:     cholecalciferol (Vitamin D-3) 5,000 Units tablet, Take 1 tablet (5,000 Units) by mouth once daily. 1 tablet daily, Disp: , Rfl:     dietary supplement capsule, Take 1 Dose by mouth once daily. Beet supplement, Disp: , Rfl:     Eliquis 5 mg tablet, Take 1 tablet (5 mg) by mouth 2 times a day., Disp: , Rfl:     ferrous sulfate 325 (65 Fe) MG tablet, Take 1 tablet by mouth 2 times a week., Disp: , Rfl:     flecainide (Tambocor) 100 mg tablet, Take 1 tablet (100 mg) by mouth 2 times a day., Disp: 60 tablet, Rfl: 11    levothyroxine (Synthroid, Levoxyl) 75 mcg tablet, Take 1 tablet (75 mcg) by mouth once daily in the morning. Take before meals., Disp: 90 tablet, Rfl: 3    multivitamin tablet, Take 1 tablet by mouth once daily., Disp: , Rfl:     omega 3-dha-epa-fish oil (Fish OiL) 1,000 mg (120 mg-180 mg) capsule, Take 1 capsule (1,000 mg) by mouth once daily., Disp: , Rfl:     RED BEET ROOT-SOUR CHERRY EXT ORAL, Take 1 tablet by mouth early in the morning.., Disp: , Rfl:      red yeast rice 600 mg capsule, Take 1 capsule by mouth once daily., Disp: , Rfl:     vitamins A,C,E-zinc-copper (PreserVision AREDS) 2,148 mcg-113 mg-45 mg-17.4mg tablet, Take 1 tablet by mouth once daily., Disp: , Rfl:     chlorhexidine (Peridex) 0.12 % solution, Swish for 30 seconds and spit 15mL of solution the night before and morning of surgery, Disp: 475 mL, Rfl: 0    enoxaparin (Lovenox) 60 mg/0.6 mL syringe, Inject 1 dose sub-q in the morning only the day prior to surgery (Patient not taking: Reported on 5/9/2024), Disp: 1 each, Rfl: 0    Prolia 60 mg/mL syringe, Inject 1 mL (60 mg total) under the skin every 6 months. (Patient not taking: Reported on 5/9/2024), Disp: 1 mL, Rfl: 1         PAT ROS:   Constitutional:    no fever   no chills   no unexpected weight change  Neuro/Psych:    no numbness   no weakness   no light-headedness   no confusion  Eyes:    no discharge   no pain   no vision loss   no diplopia   no visual disturbance  Ears:    no ear pain   no hearing loss   no tinnitus  Nose:    no nasal discharge   no sinus congestion   no epistaxis  Mouth:    no dental issues   no mouth pain   no oral bleeding   no mouth lesions  Throat:    no throat pain   no dysphagia  Neck:    no neck pain   no neck stiffness  Cardio:    Functional 4 Mets. Patient denies SOB walking up 2 flights of stairs   Row machine and bike; works as dentist on feet all day   no chest pain   no palpitations   no peripheral edema   no dyspnea   no FONSECA  Respiratory:    no cough   no wheezing   no hemoptysis   no shortness of breath  Endocrine:    no cold intolerance   no heat intolerance  GI:    no abdominal distention   no abdominal pain   no constipation   no diarrhea   no nausea   no vomiting   no blood in stool  :    no difficulty urinating   no dysuria   no oliguria  Musculoskeletal:    arthralgias (left foot in boot; hands)   no myalgias   no decreased ROM  Hematologic:    bruises/bleeds easily (eliquis)   no excessive  bleeding   no history of blood transfusion   no blood clots  Skin:   no skin changes   no sores/wound   no rash      Physical Exam  Constitutional:       General: She is not in acute distress.     Appearance: Normal appearance. She is not ill-appearing, toxic-appearing or diaphoretic.   HENT:      Head: Normocephalic and atraumatic.      Nose: Nose normal. No rhinorrhea.      Mouth/Throat:      Pharynx: No oropharyngeal exudate.   Eyes:      Extraocular Movements: Extraocular movements intact.      Conjunctiva/sclera: Conjunctivae normal.   Cardiovascular:      Rate and Rhythm: Normal rate. Rhythm irregular.      Heart sounds: No murmur heard.     No friction rub. No gallop.      Comments: Functional 4 Mets. Patient denies SOB walking up 2 flights of stairs  A fib - irregularly irregular HR     Pulmonary:      Effort: Pulmonary effort is normal. No respiratory distress.      Breath sounds: Normal breath sounds. No stridor. No wheezing or rhonchi.   Abdominal:      General: Bowel sounds are normal. There is no distension.      Palpations: Abdomen is soft. There is no mass.      Tenderness: There is no abdominal tenderness. There is no guarding or rebound.      Hernia: No hernia is present.   Musculoskeletal:         General: Tenderness (left ankle in boot; decreased ROM with lateral and medial movement of left ankle) present. No swelling, deformity or signs of injury. Normal range of motion.      Cervical back: Normal range of motion and neck supple. No rigidity or tenderness.   Skin:     General: Skin is warm and dry.      Coloration: Skin is not jaundiced or pale.      Findings: No bruising, erythema, lesion or rash.   Neurological:      General: No focal deficit present.      Mental Status: She is alert and oriented to person, place, and time.      Cranial Nerves: No cranial nerve deficit.      Sensory: No sensory deficit.      Motor: No weakness.      Coordination: Coordination normal.   Psychiatric:         Mood  "and Affect: Mood normal.         Behavior: Behavior normal.          PAT AIRWAY:   Airway:     Mallampati::  I    Neck ROM::  Full   No broken teeth, no dentures and no missing teeth      Visit Vitals  /87   Pulse 83   Temp 36.6 °C (97.9 °F)   Resp 16   Ht 1.626 m (5' 4\")   Wt 58 kg (127 lb 13.9 oz)   SpO2 99%   BMI 21.95 kg/m²   OB Status Postmenopausal   Smoking Status Never   BSA 1.62 m²       LABS:  Lab Results   Component Value Date    WBC 8.6 05/09/2024    HGB 14.6 05/09/2024    HCT 41.9 05/09/2024    MCV 96 05/09/2024     05/09/2024      Lab Results   Component Value Date    GLUCOSE 66 (L) 05/09/2024    CALCIUM 9.1 05/09/2024     (L) 05/09/2024    K 4.7 05/09/2024    CO2 24 05/09/2024    CL 93 (L) 05/09/2024    BUN 11 05/09/2024    CREATININE 0.60 05/09/2024      Lab Results   Component Value Date    HGBA1C 3.9 05/09/2024       Contains abnormal data Sodium  Order: [id number]   Status: Final result       Visible to patient: Yes (seen)       Dx: Preop testing    0 Result Notes            Component  Ref Range & Units 1 d ago  (5/15/24) 7 d ago  (5/9/24) 2 mo ago  (3/1/24) 2 mo ago  (2/29/24) 3 mo ago  (1/29/24) 6 mo ago  (10/20/23) 7 mo ago  (10/17/23)   Sodium  136 - 145 mmol/L 133 Low  129 Low  127 Low  124 Low  127 Low  132 Low  130 Low    Resulting Agency [facility] EM              Specimen Collected: 05/15/24 08:00           EKG 5/9/24   A fib  Left axis deviation  Low voltage QRS  Abnormal EKG  Vent rate 91 bpm    Assessment and Plan:   Patient is a 68-year-old female scheduled for a Left Ankle Arthroplasty - Left  with Dr. Ferguson on 5/21/24.    Patient has no active cardiac symptoms.   Patient denies any chest pain, tightness, heaviness, pressure, radiating pain, palpitations, irregular heartbeats, lightheadedness, cough, congestion, shortness of breath, FONSECA, PND, near syncope, weight loss or gain.     RCRI  0  , 3.9 % Risk of MACE    Cardiac:  A fib - cont flecainide " "on dos    HTN - Pt not currently prescribed any meds for HTN  Encouraged lifestyle modifications, low-sodium diet, and increase activity as tolerated.  Monitor BP and follow up with managing physician for readings sustaining >140/90.    CAD - CT cardiac score 1/29/24 was 137    Pt saw Dr. Palma 5/8/24 and note states:    \"Her EKG today demonstrates atrial fibrillation, ventricular sponsor 91 bpm, left axis deviation, low voltage QRS, poor R wave progression, no acute changes. I did discuss this with her. She has had surgeries in the past without any problems from a cardiac or anesthesia standpoint. I did recommend that she hold her Eliquis for 3 days prior to her upcoming surgery. Her GFR is 90. I did recommend 1 dose of Lovenox in the morning prior to surgery. \"    Endocrine:  Hypothyroidism - cont levothyroxine on dos    Hematology:  Patient instructed to ambulate as soon as possible postoperatively to decrease thromboembolic risk.   Initiate mechanical DVT prophylaxis as soon as possible and initiate chemical prophylaxis when deemed safe from a bleeding standpoint post surgery.     LABS: CBC, BMP, MRSA, A1c, EKG . Lab results reviewed and show hyponatremia of 129.    Followup: MRSA and A1c pending; repeat Na level 5/14/24    Addendum 5/10/24:  A1c results reviewed and unremarkable    Addendum 5/16/24:  Repeat Na levels are within acceptable limits to proceed as planned    STOP BANG: >50, HTN = 2    Caprini: 4    Risk assessment complete.  Patient is scheduled for a intermediate surgical risk procedure.        Preoperative medication instructions were provided and reviewed with the patient.  Any additional testing or evaluation was explained to the patient.  Nothing by mouth instructions were discussed and patient's questions were answered prior to conclusion to this encounter.  Patient verbalized understanding of preoperative instructions given in preadmission testing; discharge instructions available in " EMR.    This note was dictated by a speech recognition.  Minor errors may have been detected in a speech recognition.

## 2024-05-09 NOTE — CPM/PAT H&P
"[facility]/PAT Evaluation       Name: Delaney Ch (Delaney Ch \"Pegg\")  /Age: 1955/68 y.o.       Date of Consult: 24     Referring Provider: Dr. Ferguson    Surgery, Date, and Length: Left Ankle Arthroplasty - Left , 24, 120MIN    Delaney Ch is a 68 year-old female who [facility] for perioperative risk assessment prior to surgery.    Patient presents with a primary diagnosis of left ankle pain. Pt notes injury over 12 months ago she was caring for her mother's dog when the leash became wrapped around her ankle. This caused a fall.  She fractured her clavicle and left ankle.  She has been to see several ortho docs since the injury.  Of note, she also suffered crush injury at age 15 which required hardware for repair.  Pt refers pain is severe with weight bearing.  She uses ice which helps.  She is unable to take NSAIDs due to current anticoag therapy.  Tylenol is not helpful.  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 She wishes to proceed with surgery as planned.      This note was created in part upon personal review of patient's medical records.      Patient is scheduled to have Left Ankle Arthroplasty - Left       Pt denies any past history of anesthetic complications such as PONV, awareness, prolonged sedation, dental damage, aspiration, cardiac arrest, difficult intubation, difficult I.V. access or unexpected hospital admissions.  NO malignant hyperthermia and or pseudocholinesterase deficiency.  No history of blood transfusions     The patient is not a Spiritism and will accept blood and blood products if medically indicated.   Type and screen NOT sent.     Past Medical History:   Diagnosis Date    A-fib (Multi)     s/p 24 Cardioversion    Abnormal screening cardiac CT     :1. Coronary artery calcium score of 137*. (LAD, LCX)     2. 77th percentile** for age, gender in asymptomatic patients.     *Coronary Artery  Agatston score    Anticoagulated     " Eliquis    Anxiety     Arrhythmia     Cardiology follow-up encounter     Russell Palma MD- has APT 5/8/2024    Closed fracture of sternal end of clavicle 11/04/2022    Dislocation of acromioclavicular joint 11/04/2022    H/O echocardiogram     6/21: CONCLUSIONS:     1. The left ventricular systolic function is normal with a 60-65% estimated ejection     fraction.     2. Spectral Doppler shows an impaired relaxation pattern of left ventricular diastolic filling     Mild MR/TR    History of Holter monitoring     1/19: Underlying AFIB with RVR     5/21: NSR with occ isolated PVCs (when had sx)    History of nuclear stress test     9/12: normal    Hypertension     Hypothyroid     Palpitations 12/26/2018    Poor balance of body weight 02/29/2024    Raynaud's disease     Shortness of breath at rest 02/29/2024    Shoulder pain 02/29/2024    Syncope and collapse     Traumatic arthritis of foot, unspecified laterality        Past Surgical History:   Procedure Laterality Date    ANKLE SURGERY  07/14/2023    1. LEFT ANKLE ARTHROTOMY WITH SYNEVECTOMY/ LET PARTIAL TIBIA EXCISION/ LEFT SUBTALAR JOINT INJECTION WITH C-ARM    BACK SURGERY      L2-S1 discectomy and fusio    BELT ABDOMINOPLASTY      Abdominoplasty    BUNIONECTOMY      Bunionectomy    CARDIAC CATHETERIZATION  02/2022    Cardiac catheterization    CARDIOVERSION  02/2022    Cardioversion    CATARACT EXTRACTION      Cataract surgery    COLONOSCOPY  12/2019 12/19: diverticulosis, 2mm polyp (TA)     7/19/06: Impression:Hyperplastic polyp sigmoid-removed.  Normal rectum. Normal      cecum. Normal ascending colon and ileocecal valve. A single diminutive      polyp found in the sigmoid; removed by cold biopsy polypectomy.    ESOPHAGOGASTRODUODENOSCOPY  10/08/2021    10/8/21: - Normal examined duodenum. - Erythematous mucosa in the antrum. Biopsied.- 3 cm hiatal hernia.    HIP SURGERY      Right DEBORAH x2    TIBIA FRACTURE SURGERY      left tib/fib ORIF    TOTAL  KNEE ARTHROPLASTY      3/21,  (bilateral)    TUBAL LIGATION      Tubal ligation    WRIST SURGERY      Wrist surgery       Patient  reports being sexually active and has had partner(s) who are male. She reports using the following method of birth control/protection: Post-menopausal.    Family History   Problem Relation Name Age of Onset    Osteoporotic fracture Mother      Other (carotid artery stenosis) Father Ray     Coronary artery disease Father Ray     Other (coronary artery bypass graft) Father Ray     Peripheral vascular disease Father Ray     Abnormal EKG Father Ray     Angina Father Ray     Atrial fibrillation Father Ray     Atrial fibrillation Other       Social History     Socioeconomic History    Marital status:      Spouse name: Not on file    Number of children: Not on file    Years of education: Not on file    Highest education level: Not on file   Occupational History    Occupation: dentist     Comment: office in [place]   Tobacco Use    Smoking status: Never    Smokeless tobacco: Never   Vaping Use    Vaping status: Never Used   Substance and Sexual Activity    Alcohol use: Yes     Alcohol/week: 3.0 standard drinks of alcohol     Types: 3 Glasses of wine per week     Comment: 3 glasses of wine/ wk    Drug use: Never    Sexual activity: Yes     Partners: Male     Birth control/protection: Post-menopausal   Other Topics Concern    Not on file   Social History Narrative    Family History:        F:  Hypertension, CAD, COPD, AFIB ( age 86)        M:  OP/fractured femur ( in 3/24 at age 91)        B:  AFIB        S:  Syncope        Social History:     but has SO    (mom lives with her)     2 kids     Works/Dentist [address]    Nonsmoker    ETOH socially     Social Determinants of Health     Financial Resource Strain: Low Risk  (3/1/2024)    Overall Financial Resource Strain (CARDIA)     Difficulty of Paying Living Expenses: Not hard at all   Food Insecurity: Not on file    Transportation Needs: No Transportation Needs (3/1/2024)    PRAPARE - Transportation     Lack of Transportation (Medical): No     Lack of Transportation (Non-Medical): No   Physical Activity: Not on file   Stress: Not on file   Social Connections: Not on file   Intimate Partner Violence: Not on file   Housing Stability: Low Risk  (3/1/2024)    Housing Stability Vital Sign     Unable to Pay for Housing in the Last Year: No     Number of Places Lived in the Last Year: 1     Unstable Housing in the Last Year: No        Allergies   Allergen Reactions    Demerol [Meperidine] Anaphylaxis    Alendronate Myalgia    Cysteine Other     sores on legs    Lisinopril Dizziness    Nifedipine Other     cause AE (cold)    Rivaroxaban Other     cause AE (cold)    Rosuvastatin Myalgia       Current Outpatient Medications:     calcium carbonate 215 mg calcium (500 mg) tablet,chewable, Chew 1 tablet once daily as needed for indigestion or heartburn., Disp: , Rfl:     cholecalciferol (Vitamin D-3) 5,000 Units tablet, Take 1 tablet (5,000 Units) by mouth once daily. 1 tablet daily, Disp: , Rfl:     dietary supplement capsule, Take 1 Dose by mouth once daily. Beet supplement, Disp: , Rfl:     Eliquis 5 mg tablet, Take 1 tablet (5 mg) by mouth 2 times a day., Disp: , Rfl:     ferrous sulfate 325 (65 Fe) MG tablet, Take 1 tablet by mouth 2 times a week., Disp: , Rfl:     flecainide (Tambocor) 100 mg tablet, Take 1 tablet (100 mg) by mouth 2 times a day., Disp: 60 tablet, Rfl: 11    levothyroxine (Synthroid, Levoxyl) 75 mcg tablet, Take 1 tablet (75 mcg) by mouth once daily in the morning. Take before meals., Disp: 90 tablet, Rfl: 3    multivitamin tablet, Take 1 tablet by mouth once daily., Disp: , Rfl:     omega 3-dha-epa-fish oil (Fish OiL) 1,000 mg (120 mg-180 mg) capsule, Take 1 capsule (1,000 mg) by mouth once daily., Disp: , Rfl:     RED BEET ROOT-SOUR CHERRY EXT ORAL, Take 1 tablet by mouth early in the morning.., Disp: , Rfl:      red yeast rice 600 mg capsule, Take 1 capsule by mouth once daily., Disp: , Rfl:     vitamins A,C,E-zinc-copper (PreserVision AREDS) 2,148 mcg-113 mg-45 mg-17.4mg tablet, Take 1 tablet by mouth once daily., Disp: , Rfl:     chlorhexidine (Peridex) 0.12 % solution, Swish for 30 seconds and spit 15mL of solution the night before and morning of surgery, Disp: 475 mL, Rfl: 0    enoxaparin (Lovenox) 60 mg/0.6 mL syringe, Inject 1 dose sub-q in the morning only the day prior to surgery (Patient not taking: Reported on 5/9/2024), Disp: 1 each, Rfl: 0    Prolia 60 mg/mL syringe, Inject 1 mL (60 mg total) under the skin every 6 months. (Patient not taking: Reported on 5/9/2024), Disp: 1 mL, Rfl: 1         PAT ROS:   Constitutional:    no fever   no chills   no unexpected weight change  Neuro/Psych:    no numbness   no weakness   no light-headedness   no confusion  Eyes:    no discharge   no pain   no vision loss   no diplopia   no visual disturbance  Ears:    no ear pain   no hearing loss   no tinnitus  Nose:    no nasal discharge   no sinus congestion   no epistaxis  Mouth:    no dental issues   no mouth pain   no oral bleeding   no mouth lesions  Throat:    no throat pain   no dysphagia  Neck:    no neck pain   no neck stiffness  Cardio:    Functional 4 Mets. Patient denies SOB walking up 2 flights of stairs   Row machine and bike; works as dentist on feet all day   no chest pain   no palpitations   no peripheral edema   no dyspnea   no FONSECA  Respiratory:    no cough   no wheezing   no hemoptysis   no shortness of breath  Endocrine:    no cold intolerance   no heat intolerance  GI:    no abdominal distention   no abdominal pain   no constipation   no diarrhea   no nausea   no vomiting   no blood in stool  :    no difficulty urinating   no dysuria   no oliguria  Musculoskeletal:    arthralgias (left foot in boot; hands)   no myalgias   no decreased ROM  Hematologic:    bruises/bleeds easily (eliquis)   no excessive  bleeding   no history of blood transfusion   no blood clots  Skin:   no skin changes   no sores/wound   no rash      Physical Exam  Constitutional:       General: She is not in acute distress.     Appearance: Normal appearance. She is not ill-appearing, toxic-appearing or diaphoretic.   HENT:      Head: Normocephalic and atraumatic.      Nose: Nose normal. No rhinorrhea.      Mouth/Throat:      Pharynx: No oropharyngeal exudate.   Eyes:      Extraocular Movements: Extraocular movements intact.      Conjunctiva/sclera: Conjunctivae normal.   Cardiovascular:      Rate and Rhythm: Normal rate. Rhythm irregular.      Heart sounds: No murmur heard.     No friction rub. No gallop.      Comments: Functional 4 Mets. Patient denies SOB walking up 2 flights of stairs  A fib - irregularly irregular HR     Pulmonary:      Effort: Pulmonary effort is normal. No respiratory distress.      Breath sounds: Normal breath sounds. No stridor. No wheezing or rhonchi.   Abdominal:      General: Bowel sounds are normal. There is no distension.      Palpations: Abdomen is soft. There is no mass.      Tenderness: There is no abdominal tenderness. There is no guarding or rebound.      Hernia: No hernia is present.   Musculoskeletal:         General: Tenderness (left ankle in boot; decreased ROM with lateral and medial movement of left ankle) present. No swelling, deformity or signs of injury. Normal range of motion.      Cervical back: Normal range of motion and neck supple. No rigidity or tenderness.   Skin:     General: Skin is warm and dry.      Coloration: Skin is not jaundiced or pale.      Findings: No bruising, erythema, lesion or rash.   Neurological:      General: No focal deficit present.      Mental Status: She is alert and oriented to person, place, and time.      Cranial Nerves: No cranial nerve deficit.      Sensory: No sensory deficit.      Motor: No weakness.      Coordination: Coordination normal.   Psychiatric:         Mood  "and Affect: Mood normal.         Behavior: Behavior normal.          PAT AIRWAY:   Airway:     Mallampati::  I    Neck ROM::  Full   No broken teeth, no dentures and no missing teeth      Visit Vitals  /87   Pulse 83   Temp 36.6 °C (97.9 °F)   Resp 16   Ht 1.626 m (5' 4\")   Wt 58 kg (127 lb 13.9 oz)   SpO2 99%   BMI 21.95 kg/m²   OB Status Postmenopausal   Smoking Status Never   BSA 1.62 m²       LABS:  Lab Results   Component Value Date    WBC 8.6 05/09/2024    HGB 14.6 05/09/2024    HCT 41.9 05/09/2024    MCV 96 05/09/2024     05/09/2024      Lab Results   Component Value Date    GLUCOSE 66 (L) 05/09/2024    CALCIUM 9.1 05/09/2024     (L) 05/09/2024    K 4.7 05/09/2024    CO2 24 05/09/2024    CL 93 (L) 05/09/2024    BUN 11 05/09/2024    CREATININE 0.60 05/09/2024      Lab Results   Component Value Date    HGBA1C 3.9 05/09/2024       Contains abnormal data Sodium  Order: [id number]   Status: Final result       Visible to patient: Yes (seen)       Dx: Preop testing    0 Result Notes            Component  Ref Range & Units 1 d ago  (5/15/24) 7 d ago  (5/9/24) 2 mo ago  (3/1/24) 2 mo ago  (2/29/24) 3 mo ago  (1/29/24) 6 mo ago  (10/20/23) 7 mo ago  (10/17/23)   Sodium  136 - 145 mmol/L 133 Low  129 Low  127 Low  124 Low  127 Low  132 Low  130 Low    Resulting Agency [facility]              Specimen Collected: 05/15/24 08:00           EKG 5/9/24   A fib  Left axis deviation  Low voltage QRS  Abnormal EKG  Vent rate 91 bpm    Assessment and Plan:   Patient is a 68-year-old female scheduled for a Left Ankle Arthroplasty - Left  with Dr. Ferguson on 5/21/24.    Patient has no active cardiac symptoms.   Patient denies any chest pain, tightness, heaviness, pressure, radiating pain, palpitations, irregular heartbeats, lightheadedness, cough, congestion, shortness of breath, FONSECA, PND, near syncope, weight loss or gain.     RCRI  0  , 3.9 % Risk of MACE    Cardiac:  A fib - cont flecainide " "on dos    HTN - Pt not currently prescribed any meds for HTN  Encouraged lifestyle modifications, low-sodium diet, and increase activity as tolerated.  Monitor BP and follow up with managing physician for readings sustaining >140/90.    CAD - CT cardiac score 1/29/24 was 137    Pt saw Dr. Palma 5/8/24 and note states:    \"Her EKG today demonstrates atrial fibrillation, ventricular sponsor 91 bpm, left axis deviation, low voltage QRS, poor R wave progression, no acute changes. I did discuss this with her. She has had surgeries in the past without any problems from a cardiac or anesthesia standpoint. I did recommend that she hold her Eliquis for 3 days prior to her upcoming surgery. Her GFR is 90. I did recommend 1 dose of Lovenox in the morning prior to surgery. \"    Endocrine:  Hypothyroidism - cont levothyroxine on dos    Hematology:  Patient instructed to ambulate as soon as possible postoperatively to decrease thromboembolic risk.   Initiate mechanical DVT prophylaxis as soon as possible and initiate chemical prophylaxis when deemed safe from a bleeding standpoint post surgery.     LABS: CBC, BMP, MRSA, A1c, EKG . Lab results reviewed and show hyponatremia of 129.    Followup: MRSA and A1c pending; repeat Na level 5/14/24    Addendum 5/10/24:  A1c results reviewed and unremarkable    Addendum 5/16/24:  Repeat Na levels are within acceptable limits to proceed as planned    STOP BANG: >50, HTN = 2    Caprini: 4    Risk assessment complete.  Patient is scheduled for a intermediate surgical risk procedure.        Preoperative medication instructions were provided and reviewed with the patient.  Any additional testing or evaluation was explained to the patient.  Nothing by mouth instructions were discussed and patient's questions were answered prior to conclusion to this encounter.  Patient verbalized understanding of preoperative instructions given in preadmission testing; discharge instructions available in " EMR.    This note was dictated by a speech recognition.  Minor errors may have been detected in a speech recognition.

## 2024-05-10 ENCOUNTER — OFFICE VISIT (OUTPATIENT)
Dept: CARDIOLOGY | Facility: CLINIC | Age: 69
End: 2024-05-10
Payer: MEDICARE

## 2024-05-10 VITALS
SYSTOLIC BLOOD PRESSURE: 110 MMHG | DIASTOLIC BLOOD PRESSURE: 70 MMHG | BODY MASS INDEX: 21.68 KG/M2 | HEIGHT: 64 IN | WEIGHT: 127 LBS | HEART RATE: 100 BPM

## 2024-05-10 DIAGNOSIS — I48.11 LONGSTANDING PERSISTENT ATRIAL FIBRILLATION (MULTI): ICD-10-CM

## 2024-05-10 DIAGNOSIS — Z71.89 ENCOUNTER FOR MEDICATION REVIEW AND COUNSELING: ICD-10-CM

## 2024-05-10 DIAGNOSIS — Z71.89 ENCOUNTER TO DISCUSS TREATMENT OPTIONS: ICD-10-CM

## 2024-05-10 DIAGNOSIS — E78.2 HYPERLIPIDEMIA, MIXED: ICD-10-CM

## 2024-05-10 DIAGNOSIS — I49.1 PAC (PREMATURE ATRIAL CONTRACTION): ICD-10-CM

## 2024-05-10 DIAGNOSIS — I48.91 ATRIAL FIBRILLATION, CONTROLLED (MULTI): Primary | ICD-10-CM

## 2024-05-10 DIAGNOSIS — I10 ESSENTIAL HYPERTENSION: ICD-10-CM

## 2024-05-10 DIAGNOSIS — Z78.9 NEVER SMOKED CIGARETTES: ICD-10-CM

## 2024-05-10 PROCEDURE — 3074F SYST BP LT 130 MM HG: CPT | Performed by: INTERNAL MEDICINE

## 2024-05-10 PROCEDURE — 1123F ACP DISCUSS/DSCN MKR DOCD: CPT | Performed by: INTERNAL MEDICINE

## 2024-05-10 PROCEDURE — 1160F RVW MEDS BY RX/DR IN RCRD: CPT | Performed by: INTERNAL MEDICINE

## 2024-05-10 PROCEDURE — 3008F BODY MASS INDEX DOCD: CPT | Performed by: INTERNAL MEDICINE

## 2024-05-10 PROCEDURE — 99215 OFFICE O/P EST HI 40 MIN: CPT | Performed by: INTERNAL MEDICINE

## 2024-05-10 PROCEDURE — 1036F TOBACCO NON-USER: CPT | Performed by: INTERNAL MEDICINE

## 2024-05-10 PROCEDURE — 3078F DIAST BP <80 MM HG: CPT | Performed by: INTERNAL MEDICINE

## 2024-05-10 PROCEDURE — 1157F ADVNC CARE PLAN IN RCRD: CPT | Performed by: INTERNAL MEDICINE

## 2024-05-10 PROCEDURE — 1159F MED LIST DOCD IN RCRD: CPT | Performed by: INTERNAL MEDICINE

## 2024-05-10 ASSESSMENT — ENCOUNTER SYMPTOMS
PALPITATIONS: 0
DYSPNEA ON EXERTION: 0

## 2024-05-10 NOTE — PROGRESS NOTES
"Chief Complaint:   Follow-up (Pt is following up after 6 months)     History Of Present Illness:    Lee Ch is a 68 y.o. female presenting with follow-up.     She is anticipating having left ankle replacement later this month.  She plans to be off work for 2 weeks.  We discussed anticoagulation.  Procedure then restarting anticoagulation.  After 4 weeks anticoagulation restarted after ankle surgery, then plan for cardioversion.  We did review that if she has cardioversion now that she would need to wait 4 weeks before anticoagulation could be interrupted.  She opts to restart anticoagulation post orthopedic surgery and then schedule cardioversion as noted above.    Lastly she inquires about cryoablation PVI for more definitive treatment of atrial fibrillation.  Brochures and posters reviewed with her.  All questions answered.    Last Recorded Vitals:  Vitals:    05/10/24 1024   BP: 110/70   BP Location: Right arm   Patient Position: Sitting   Pulse: 100   Weight: 57.6 kg (127 lb)   Height: 1.626 m (5' 4\")       Past Medical History:  See List     Past Surgical History:  See List      Social History:  She reports that she has never smoked. She has never used smokeless tobacco. She reports current alcohol use of about 3.0 standard drinks of alcohol per week. She reports that she does not use drugs.    Family History:  Family History   Problem Relation Name Age of Onset    Osteoporotic fracture Mother      Other (carotid artery stenosis) Father Ray     Coronary artery disease Father Ray     Other (coronary artery bypass graft) Father Ray     Peripheral vascular disease Father Ray     Abnormal EKG Father Ray     Angina Father Ray     Atrial fibrillation Father Ray     Atrial fibrillation Other          Allergies:  Demerol [meperidine], Alendronate, Cysteine, Lisinopril, Nifedipine, Rivaroxaban, and Rosuvastatin    Outpatient Medications:  Current Outpatient Medications   Medication Instructions    ALPRAZolam XR " (XANAX XR) 0.5 mg, oral, As needed, Do not crush, chew, or split.    calcium carbonate 215 mg calcium (500 mg) tablet,chewable 1 tablet, oral, Daily PRN    chlorhexidine (Peridex) 0.12 % solution Swish for 30 seconds and spit 15mL of solution the night before and morning of surgery    cholecalciferol (Vitamin D-3) 5,000 Units tablet 1 tablet, oral, Daily, 1 tablet daily    dietary supplement capsule 1 Dose, oral, Daily, Beet supplement     Eliquis 5 mg, oral, 2 times daily    enoxaparin (Lovenox) 60 mg/0.6 mL syringe Inject 1 dose sub-q in the morning only the day prior to surgery    ferrous sulfate (325 mg ferrous sulfate) 325 mg, oral, 2 times weekly    flecainide (TAMBOCOR) 100 mg, oral, 2 times daily    levothyroxine (SYNTHROID, LEVOXYL) 75 mcg, oral, Daily before breakfast    multivitamin tablet 1 tablet, oral, Daily    omega 3-dha-epa-fish oil (Fish OiL) 1,000 mg (120 mg-180 mg) capsule 1 capsule, oral, Daily    Prolia 60 mg, subcutaneous, Every 6 months    RED BEET ROOT-SOUR CHERRY EXT ORAL 1 tablet, oral, Daily    red yeast rice 600 mg capsule 1 capsule, oral, Daily    sodium chloride 1 g, oral, Daily    vitamins A,C,E-zinc-copper (PreserVision AREDS) 2,148 mcg-113 mg-45 mg-17.4mg tablet 1 tablet, oral, Daily   Review of Systems   Cardiovascular:  Negative for chest pain, dyspnea on exertion and palpitations.   All other systems reviewed and are negative.        Physical Exam:  Constitutional:       General: Awake.      Appearance: Normal and healthy appearance. Well-developed and not in distress.   Neck:      Vascular: No JVR. JVD normal.   Pulmonary:      Effort: Pulmonary effort is normal.      Breath sounds: Normal breath sounds. No wheezing. No rhonchi. No rales.   Chest:      Chest wall: Not tender to palpatation.   Cardiovascular:      PMI at left midclavicular line. Normal rate. Irregularly irregular rhythm. Normal S1. Normal S2.       Murmurs: There is no murmur.      No gallop.  No click. No rub.       Comments: Atrial fibrillation  Pulses:     Intact distal pulses.   Edema:     Peripheral edema absent.   Abdominal:      Tenderness: There is no abdominal tenderness.   Musculoskeletal: Normal range of motion.         General: No tenderness. Skin:     General: Skin is warm and dry.   Neurological:      General: No focal deficit present.      Mental Status: Alert and oriented to person, place and time.            Last Labs:  CBC -  Lab Results   Component Value Date    WBC 8.6 05/09/2024    HGB 14.6 05/09/2024    HCT 41.9 05/09/2024    MCV 96 05/09/2024     05/09/2024       CMP -  Lab Results   Component Value Date    CALCIUM 9.1 05/09/2024    PROT 6.0 (L) 03/01/2024    ALBUMIN 3.5 03/01/2024    AST 38 03/01/2024    ALT 34 03/01/2024    ALKPHOS 52 03/01/2024    BILITOT 0.6 03/01/2024       LIPID PANEL -   Lab Results   Component Value Date    CHOL 256 (H) 09/01/2023    TRIG 50 09/01/2023    .2 09/01/2023    CHHDL 2.1 09/01/2023    LDLF 126 (H) 09/01/2023    VLDL 10 09/01/2023       RENAL FUNCTION PANEL -   Lab Results   Component Value Date    GLUCOSE 66 (L) 05/09/2024     (L) 05/09/2024    K 4.7 05/09/2024    CL 93 (L) 05/09/2024    CO2 24 05/09/2024    ANIONGAP 17 05/09/2024    BUN 11 05/09/2024    CREATININE 0.60 05/09/2024    CALCIUM 9.1 05/09/2024    ALBUMIN 3.5 03/01/2024        Lab Results   Component Value Date    HGBA1C 3.9 05/09/2024       Last Cardiology Tests:  ECG:  ECG 12 lead (Clinic Performed) 05/08/2024  Atrial fibrillation.  Left axis deviation.  Corrected QT interval 420 ms.  Poor R wave progression.      Lab review: I have personally reviewed the laboratory result(s) see above    Assessment/Plan   Problem List Items Addressed This Visit             ICD-10-CM    Atrial fibrillation (Multi) I48.91    Hyperlipidemia, mixed E78.2    PAC (premature atrial contraction) I49.1    Never smoked cigarettes Z78.9    Essential hypertension I10    BMI 21.0-21.9, adult Z68.21     "Encounter for medication review and counseling Z71.89    Encounter to discuss treatment options Z71.89     Other Visit Diagnoses         Codes    Atrial fibrillation, controlled (Multi)    -  Primary I48.91    Relevant Orders    Cardioversion External    Basic Metabolic Panel    CBC    Protime-INR              Angela Epps RN    Longstanding persistent atrial fibrillation, s/p [place] chemical cardioversion and plan.  Current atrial fibrillation.  Intolerance to propafenone with dizziness, rash, and malaise.  Tolerating increased dose of flecainide and Eliquis. Continue medications. Discussed refills.  Once anticoagulation is restarted and maintained for 4 weeks after ankle replacement, plan for cardioversion.  Patient prefers no FORTINO prior to cardioversion, therefore, will opt to wait for 4 weeks before cardioversion.  E consent obtained.  Shared decision making also performed.  Discussed cryoablation brochures and posters.  All questions answered.  She opts for cryoablation later in the year, plan for autumn 2024.  E consent obtained.  High risk.med Eliquis - continue longterm. Refill discussed. Also discussed referral for watchman in the past and she declined in the past and again today.  Vasodepressor syncope. Chronic. Stable. Reenforce behavioral modification including increase po fluid intake and use of compression stocking.   Hypertension, controlled benign chronic. Stable. Patient requires only low-dose lisinopril to maintain blood pressure control.  Impaired diastolic filling. Stable. Reviewed with patient. Continue blood pressure control.  Hyper lipidemia. Reviewed last testing. Discussed association with thyroid disease. Discussed risk factor modification for coronary disease  Raynaud's phenomena with exacerbation by beta blocker and possibly with Xarelto with \"cold \"feeling. Tolerates Eliquis.  Hypothyroidism. On replacement therapy. Follows with PCP for thyroid profile. As noted above, discussed " association of hyperlipidemia and thyroid disease. Also discussed association of atrial fibrillation and thyroid disease.  Bilateral knee arthritis  History of orthopedic surgeries including lower spinal rods and right hip replacement  History of ankle fracture. No syncope.  Overweight     AHA recommendations for exercise, diet, and behavioral modification reviewed with pt.     The patient and I discussed the mechanism of arrhythmia, cath results, chemical cardioversion, flecainide, previously declined watchman [place] trial, ECG, cardioversion after reinitiation of anticoagulation post orthopedic surgery and after waiting 4 weeks of therapeutic anticoagulation, eventual cryoablation, shared decision making, informed consent, indications for and types of medications, potential for ablation which she declined, discussion if and what medication refills needed, treatment options, risks, benefits, and imponderables. American Heart Association lifestyle changes and behavioral modification discussed. All questions answered in detail.

## 2024-05-10 NOTE — PATIENT INSTRUCTIONS
Continue same medications/treatment.  Patient educated on proper medication use.  Patient educated on risk factor modification.  Please bring any lab results from other providers/physicians to your next appointment.    Please bring all medicines, vitamins, and herbal supplements with you when you come to the office.    Prescriptions will not be filled unless you are compliant with your follow up appointments or have a follow up appointment scheduled as per instruction of your physician. Refills should be requested at the time of your visit.    SCHEDULE cardioversion for the beginning of July. Obtain labs 1 week prior to procedure. Orders are in the system.   SCHEDULE cryoablation, CT, FORTINO in the fall. You will have to get the labs again prior to this procedure.     NATA AMAYA RN, AM SCRIBING FOR AND IN THE PRESENCE OF DR. ALBERTO MCNEILL MD, FACC, FACP, RS

## 2024-05-11 LAB — STAPHYLOCOCCUS SPEC CULT: NORMAL

## 2024-05-13 ENCOUNTER — PREP FOR PROCEDURE (OUTPATIENT)
Dept: ORTHOPEDIC SURGERY | Facility: CLINIC | Age: 69
End: 2024-05-13
Payer: MEDICARE

## 2024-05-14 ENCOUNTER — TELEPHONE (OUTPATIENT)
Dept: PREADMISSION TESTING | Facility: HOSPITAL | Age: 69
End: 2024-05-14
Payer: MEDICARE

## 2024-05-15 ENCOUNTER — LAB (OUTPATIENT)
Dept: LAB | Facility: LAB | Age: 69
End: 2024-05-15
Payer: MEDICARE

## 2024-05-15 DIAGNOSIS — Z01.818 PREOP TESTING: ICD-10-CM

## 2024-05-15 LAB — SODIUM SERPL-SCNC: 133 MMOL/L (ref 136–145)

## 2024-05-15 PROCEDURE — 36415 COLL VENOUS BLD VENIPUNCTURE: CPT

## 2024-05-15 PROCEDURE — 84295 ASSAY OF SERUM SODIUM: CPT

## 2024-05-20 NOTE — DISCHARGE INSTRUCTIONS
Additional instructions:  Ice/elevate operative instructions.  Keep dressing clean and dry.  Weightbearing: NWB until block wears off then 50% on operative extremity with crutches/walker.   Start Tylenol 650 mg every 6 hours while block is working.  Start Oxycodone 1 by mouth every 6 hours as needed for pain as block wears off.  Alternate Tramadol 1 tablet every 6 hours for pain.  Meloxicam 15 mg daily for 2 weeks.  Aspirin 81 mg by mouth twice a day for 2 weeks.  Colace 100 mg twice a day as needed for constipation.  F/U with Dr. Ferguson in 2 weeks.  Call [phone] for appointment.

## 2024-05-21 ENCOUNTER — HOSPITAL ENCOUNTER (OUTPATIENT)
Facility: HOSPITAL | Age: 69
Setting detail: OUTPATIENT SURGERY
Discharge: HOME | End: 2024-05-21
Attending: ORTHOPAEDIC SURGERY | Admitting: ORTHOPAEDIC SURGERY
Payer: MEDICARE

## 2024-05-21 ENCOUNTER — PHARMACY VISIT (OUTPATIENT)
Dept: PHARMACY | Facility: CLINIC | Age: 69
End: 2024-05-21
Payer: COMMERCIAL

## 2024-05-21 ENCOUNTER — ANESTHESIA EVENT (OUTPATIENT)
Dept: OPERATING ROOM | Facility: HOSPITAL | Age: 69
End: 2024-05-21
Payer: MEDICARE

## 2024-05-21 ENCOUNTER — APPOINTMENT (OUTPATIENT)
Dept: RADIOLOGY | Facility: HOSPITAL | Age: 69
End: 2024-05-21
Payer: MEDICARE

## 2024-05-21 ENCOUNTER — ANESTHESIA (OUTPATIENT)
Dept: OPERATING ROOM | Facility: HOSPITAL | Age: 69
End: 2024-05-21
Payer: MEDICARE

## 2024-05-21 VITALS
WEIGHT: 127.87 LBS | BODY MASS INDEX: 21.83 KG/M2 | DIASTOLIC BLOOD PRESSURE: 75 MMHG | TEMPERATURE: 97.5 F | HEIGHT: 64 IN | RESPIRATION RATE: 15 BRPM | OXYGEN SATURATION: 100 % | SYSTOLIC BLOOD PRESSURE: 120 MMHG | HEART RATE: 71 BPM

## 2024-05-21 DIAGNOSIS — M12.579 TRAUMATIC ARTHRITIS OF FOOT, UNSPECIFIED LATERALITY: Primary | ICD-10-CM

## 2024-05-21 DIAGNOSIS — M12.572 TRAUMATIC ARTHRITIS OF LEFT ANKLE: ICD-10-CM

## 2024-05-21 DIAGNOSIS — M25.572 PAIN IN LEFT ANKLE: ICD-10-CM

## 2024-05-21 PROCEDURE — 76000 FLUOROSCOPY <1 HR PHYS/QHP: CPT | Mod: LT

## 2024-05-21 PROCEDURE — 2500000001 HC RX 250 WO HCPCS SELF ADMINISTERED DRUGS (ALT 637 FOR MEDICARE OP): Performed by: ORTHOPAEDIC SURGERY

## 2024-05-21 PROCEDURE — A27702 PR TOTAL ANKLE REPLACEMENT: Performed by: ANESTHESIOLOGY

## 2024-05-21 PROCEDURE — 2500000005 HC RX 250 GENERAL PHARMACY W/O HCPCS: Performed by: ANESTHESIOLOGY

## 2024-05-21 PROCEDURE — 2500000004 HC RX 250 GENERAL PHARMACY W/ HCPCS (ALT 636 FOR OP/ED): Performed by: INTERNAL MEDICINE

## 2024-05-21 PROCEDURE — 2500000004 HC RX 250 GENERAL PHARMACY W/ HCPCS (ALT 636 FOR OP/ED): Performed by: ORTHOPAEDIC SURGERY

## 2024-05-21 PROCEDURE — 27702 RECONSTRUCT ANKLE JOINT: CPT | Performed by: ORTHOPAEDIC SURGERY

## 2024-05-21 PROCEDURE — 7100000002 HC RECOVERY ROOM TIME - EACH INCREMENTAL 1 MINUTE: Performed by: ORTHOPAEDIC SURGERY

## 2024-05-21 PROCEDURE — 2500000004 HC RX 250 GENERAL PHARMACY W/ HCPCS (ALT 636 FOR OP/ED)

## 2024-05-21 PROCEDURE — A4217 STERILE WATER/SALINE, 500 ML: HCPCS | Performed by: ORTHOPAEDIC SURGERY

## 2024-05-21 PROCEDURE — 7100000009 HC PHASE TWO TIME - INITIAL BASE CHARGE: Performed by: ORTHOPAEDIC SURGERY

## 2024-05-21 PROCEDURE — 3600000004 HC OR TIME - INITIAL BASE CHARGE - PROCEDURE LEVEL FOUR: Performed by: ORTHOPAEDIC SURGERY

## 2024-05-21 PROCEDURE — 2720000007 HC OR 272 NO HCPCS: Performed by: ORTHOPAEDIC SURGERY

## 2024-05-21 PROCEDURE — 7100000010 HC PHASE TWO TIME - EACH INCREMENTAL 1 MINUTE: Performed by: ORTHOPAEDIC SURGERY

## 2024-05-21 PROCEDURE — 3700000002 HC GENERAL ANESTHESIA TIME - EACH INCREMENTAL 1 MINUTE: Performed by: ORTHOPAEDIC SURGERY

## 2024-05-21 PROCEDURE — A27702 PR TOTAL ANKLE REPLACEMENT: Performed by: INTERNAL MEDICINE

## 2024-05-21 PROCEDURE — RXMED WILLOW AMBULATORY MEDICATION CHARGE

## 2024-05-21 PROCEDURE — 7100000001 HC RECOVERY ROOM TIME - INITIAL BASE CHARGE: Performed by: ORTHOPAEDIC SURGERY

## 2024-05-21 PROCEDURE — 3700000001 HC GENERAL ANESTHESIA TIME - INITIAL BASE CHARGE: Performed by: ORTHOPAEDIC SURGERY

## 2024-05-21 PROCEDURE — 2780000003 HC OR 278 NO HCPCS: Performed by: ORTHOPAEDIC SURGERY

## 2024-05-21 PROCEDURE — 3600000009 HC OR TIME - EACH INCREMENTAL 1 MINUTE - PROCEDURE LEVEL FOUR: Performed by: ORTHOPAEDIC SURGERY

## 2024-05-21 PROCEDURE — 2500000004 HC RX 250 GENERAL PHARMACY W/ HCPCS (ALT 636 FOR OP/ED): Performed by: ANESTHESIOLOGY

## 2024-05-21 PROCEDURE — C1776 JOINT DEVICE (IMPLANTABLE): HCPCS | Performed by: ORTHOPAEDIC SURGERY

## 2024-05-21 PROCEDURE — 2500000005 HC RX 250 GENERAL PHARMACY W/O HCPCS

## 2024-05-21 DEVICE — IMPLANTABLE DEVICE: Type: IMPLANTABLE DEVICE | Site: ANKLE | Status: FUNCTIONAL

## 2024-05-21 RX ORDER — PROPOFOL 10 MG/ML
INJECTION, EMULSION INTRAVENOUS AS NEEDED
Status: DISCONTINUED | OUTPATIENT
Start: 2024-05-21 | End: 2024-05-21

## 2024-05-21 RX ORDER — HYDRALAZINE HYDROCHLORIDE 20 MG/ML
5 INJECTION INTRAMUSCULAR; INTRAVENOUS EVERY 30 MIN PRN
Status: DISCONTINUED | OUTPATIENT
Start: 2024-05-21 | End: 2024-05-21 | Stop reason: HOSPADM

## 2024-05-21 RX ORDER — OXYCODONE HYDROCHLORIDE 5 MG/1
5 TABLET ORAL EVERY 4 HOURS PRN
Status: DISCONTINUED | OUTPATIENT
Start: 2024-05-21 | End: 2024-05-21 | Stop reason: HOSPADM

## 2024-05-21 RX ORDER — CEFAZOLIN 1 G/1
INJECTION, POWDER, FOR SOLUTION INTRAVENOUS AS NEEDED
Status: DISCONTINUED | OUTPATIENT
Start: 2024-05-21 | End: 2024-05-21

## 2024-05-21 RX ORDER — NEOSTIGMINE METHYLSULFATE 0.5 MG/ML
INJECTION, SOLUTION INTRAVENOUS AS NEEDED
Status: DISCONTINUED | OUTPATIENT
Start: 2024-05-21 | End: 2024-05-21

## 2024-05-21 RX ORDER — BUPIVACAINE HCL/EPINEPHRINE 0.5-1:200K
VIAL (ML) INJECTION AS NEEDED
Status: DISCONTINUED | OUTPATIENT
Start: 2024-05-21 | End: 2024-05-21

## 2024-05-21 RX ORDER — SODIUM CHLORIDE, SODIUM LACTATE, POTASSIUM CHLORIDE, CALCIUM CHLORIDE 600; 310; 30; 20 MG/100ML; MG/100ML; MG/100ML; MG/100ML
100 INJECTION, SOLUTION INTRAVENOUS CONTINUOUS
Status: DISCONTINUED | OUTPATIENT
Start: 2024-05-21 | End: 2024-05-21 | Stop reason: HOSPADM

## 2024-05-21 RX ORDER — BUPIVACAINE HYDROCHLORIDE 2.5 MG/ML
INJECTION, SOLUTION EPIDURAL; INFILTRATION; INTRACAUDAL AS NEEDED
Status: DISCONTINUED | OUTPATIENT
Start: 2024-05-21 | End: 2024-05-21

## 2024-05-21 RX ORDER — ONDANSETRON HYDROCHLORIDE 2 MG/ML
4 INJECTION, SOLUTION INTRAVENOUS ONCE AS NEEDED
Status: DISCONTINUED | OUTPATIENT
Start: 2024-05-21 | End: 2024-05-21 | Stop reason: HOSPADM

## 2024-05-21 RX ORDER — LIDOCAINE HYDROCHLORIDE 20 MG/ML
INJECTION, SOLUTION EPIDURAL; INFILTRATION; INTRACAUDAL; PERINEURAL AS NEEDED
Status: DISCONTINUED | OUTPATIENT
Start: 2024-05-21 | End: 2024-05-21

## 2024-05-21 RX ORDER — ROCURONIUM BROMIDE 10 MG/ML
INJECTION, SOLUTION INTRAVENOUS AS NEEDED
Status: DISCONTINUED | OUTPATIENT
Start: 2024-05-21 | End: 2024-05-21

## 2024-05-21 RX ORDER — TRANEXAMIC ACID 100 MG/ML
INJECTION, SOLUTION INTRAVENOUS AS NEEDED
Status: DISCONTINUED | OUTPATIENT
Start: 2024-05-21 | End: 2024-05-21

## 2024-05-21 RX ORDER — LIDOCAINE HYDROCHLORIDE 10 MG/ML
0.1 INJECTION, SOLUTION EPIDURAL; INFILTRATION; INTRACAUDAL; PERINEURAL ONCE
Status: DISCONTINUED | OUTPATIENT
Start: 2024-05-21 | End: 2024-05-21 | Stop reason: HOSPADM

## 2024-05-21 RX ORDER — MELOXICAM 7.5 MG/1
7.5 TABLET ORAL DAILY
Qty: 14 TABLET | Refills: 0 | Status: SHIPPED | OUTPATIENT
Start: 2024-05-21 | End: 2024-06-04

## 2024-05-21 RX ORDER — TRAMADOL HYDROCHLORIDE 50 MG/1
100 TABLET ORAL EVERY 6 HOURS PRN
Qty: 28 TABLET | Refills: 0 | Status: SHIPPED | OUTPATIENT
Start: 2024-05-21 | End: 2024-06-04

## 2024-05-21 RX ORDER — NAPROXEN SODIUM 220 MG/1
81 TABLET, FILM COATED ORAL 2 TIMES DAILY
Qty: 28 TABLET | Refills: 0 | Status: SHIPPED | OUTPATIENT
Start: 2024-05-21 | End: 2024-06-04

## 2024-05-21 RX ORDER — ALBUTEROL SULFATE 0.83 MG/ML
2.5 SOLUTION RESPIRATORY (INHALATION) ONCE AS NEEDED
Status: DISCONTINUED | OUTPATIENT
Start: 2024-05-21 | End: 2024-05-21 | Stop reason: HOSPADM

## 2024-05-21 RX ORDER — SODIUM CHLORIDE 0.9 G/100ML
IRRIGANT IRRIGATION AS NEEDED
Status: DISCONTINUED | OUTPATIENT
Start: 2024-05-21 | End: 2024-05-21 | Stop reason: HOSPADM

## 2024-05-21 RX ORDER — CEFAZOLIN SODIUM 2 G/100ML
2 INJECTION, SOLUTION INTRAVENOUS ONCE
Status: DISCONTINUED | OUTPATIENT
Start: 2024-05-21 | End: 2024-05-21 | Stop reason: HOSPADM

## 2024-05-21 RX ORDER — ACETAMINOPHEN 325 MG/1
975 TABLET ORAL ONCE
Status: COMPLETED | OUTPATIENT
Start: 2024-05-21 | End: 2024-05-21

## 2024-05-21 RX ORDER — OXYCODONE HYDROCHLORIDE 5 MG/1
5 TABLET ORAL EVERY 6 HOURS PRN
Qty: 28 TABLET | Refills: 0 | Status: SHIPPED | OUTPATIENT
Start: 2024-05-21 | End: 2024-05-28

## 2024-05-21 RX ORDER — LABETALOL HYDROCHLORIDE 5 MG/ML
5 INJECTION, SOLUTION INTRAVENOUS ONCE AS NEEDED
Status: DISCONTINUED | OUTPATIENT
Start: 2024-05-21 | End: 2024-05-21 | Stop reason: HOSPADM

## 2024-05-21 RX ORDER — ONDANSETRON HYDROCHLORIDE 2 MG/ML
INJECTION, SOLUTION INTRAVENOUS AS NEEDED
Status: DISCONTINUED | OUTPATIENT
Start: 2024-05-21 | End: 2024-05-21

## 2024-05-21 RX ORDER — FENTANYL CITRATE 50 UG/ML
INJECTION, SOLUTION INTRAMUSCULAR; INTRAVENOUS AS NEEDED
Status: DISCONTINUED | OUTPATIENT
Start: 2024-05-21 | End: 2024-05-21

## 2024-05-21 RX ORDER — DOCUSATE SODIUM 100 MG/1
100 CAPSULE, LIQUID FILLED ORAL 2 TIMES DAILY
Qty: 28 CAPSULE | Refills: 0 | Status: SHIPPED | OUTPATIENT
Start: 2024-05-21 | End: 2024-06-04

## 2024-05-21 RX ORDER — PREGABALIN 75 MG/1
75 CAPSULE ORAL ONCE
Status: COMPLETED | OUTPATIENT
Start: 2024-05-21 | End: 2024-05-21

## 2024-05-21 RX ORDER — MIDAZOLAM HYDROCHLORIDE 1 MG/ML
INJECTION, SOLUTION INTRAMUSCULAR; INTRAVENOUS AS NEEDED
Status: DISCONTINUED | OUTPATIENT
Start: 2024-05-21 | End: 2024-05-21

## 2024-05-21 RX ORDER — MELOXICAM 7.5 MG/1
7.5 TABLET ORAL ONCE
Status: COMPLETED | OUTPATIENT
Start: 2024-05-21 | End: 2024-05-21

## 2024-05-21 RX ADMIN — NEOSTIGMINE METHYLSULFATE 3 MG: 0.5 INJECTION, SOLUTION INTRAVENOUS at 15:40

## 2024-05-21 RX ADMIN — PROPOFOL 60 MG: 10 INJECTION, EMULSION INTRAVENOUS at 15:32

## 2024-05-21 RX ADMIN — SODIUM CHLORIDE, POTASSIUM CHLORIDE, SODIUM LACTATE AND CALCIUM CHLORIDE 100 ML/HR: 600; 310; 30; 20 INJECTION, SOLUTION INTRAVENOUS at 12:15

## 2024-05-21 RX ADMIN — TRANEXAMIC ACID 1000 MG: 1 INJECTION, SOLUTION INTRAVENOUS at 13:51

## 2024-05-21 RX ADMIN — Medication 6 L/MIN: at 15:52

## 2024-05-21 RX ADMIN — ROCURONIUM 60 MG: 100 INJECTION, SOLUTION INTRAVENOUS at 13:45

## 2024-05-21 RX ADMIN — Medication 20 ML: at 12:40

## 2024-05-21 RX ADMIN — LIDOCAINE HYDROCHLORIDE 80 MG: 20 INJECTION, SOLUTION EPIDURAL; INFILTRATION; INTRACAUDAL; PERINEURAL at 13:45

## 2024-05-21 RX ADMIN — GLYCOPYRROLATE 0.6 MG: 0.2 INJECTION, SOLUTION INTRAMUSCULAR; INTRAVITREAL at 15:40

## 2024-05-21 RX ADMIN — CARBOXYMETHYLCELLULOSE SODIUM 1 DROP: 0.5 SOLUTION/ DROPS OPHTHALMIC at 13:49

## 2024-05-21 RX ADMIN — ACETAMINOPHEN 975 MG: 325 TABLET ORAL at 12:30

## 2024-05-21 RX ADMIN — FENTANYL CITRATE 50 MCG: 50 INJECTION, SOLUTION INTRAMUSCULAR; INTRAVENOUS at 13:45

## 2024-05-21 RX ADMIN — BUPIVACAINE HYDROCHLORIDE 10 ML: 2.5 INJECTION, SOLUTION EPIDURAL; INFILTRATION; INTRACAUDAL; PERINEURAL at 12:40

## 2024-05-21 RX ADMIN — CEFAZOLIN 2 G: 1 INJECTION, POWDER, FOR SOLUTION INTRAMUSCULAR; INTRAVENOUS at 13:51

## 2024-05-21 RX ADMIN — DEXAMETHASONE SODIUM PHOSPHATE 4 MG: 4 INJECTION, SOLUTION INTRAMUSCULAR; INTRAVENOUS at 13:49

## 2024-05-21 RX ADMIN — PREGABALIN 75 MG: 75 CAPSULE ORAL at 12:30

## 2024-05-21 RX ADMIN — FENTANYL CITRATE 50 MCG: 50 INJECTION, SOLUTION INTRAMUSCULAR; INTRAVENOUS at 12:31

## 2024-05-21 RX ADMIN — FENTANYL CITRATE 50 MCG: 50 INJECTION, SOLUTION INTRAMUSCULAR; INTRAVENOUS at 12:30

## 2024-05-21 RX ADMIN — FENTANYL CITRATE 50 MCG: 50 INJECTION, SOLUTION INTRAMUSCULAR; INTRAVENOUS at 13:56

## 2024-05-21 RX ADMIN — MIDAZOLAM HYDROCHLORIDE 2 MG: 1 INJECTION, SOLUTION INTRAMUSCULAR; INTRAVENOUS at 12:30

## 2024-05-21 RX ADMIN — MELOXICAM 7.5 MG: 7.5 TABLET ORAL at 12:15

## 2024-05-21 RX ADMIN — ONDANSETRON 4 MG: 2 INJECTION INTRAMUSCULAR; INTRAVENOUS at 15:40

## 2024-05-21 RX ADMIN — PROPOFOL 140 MG: 10 INJECTION, EMULSION INTRAVENOUS at 13:45

## 2024-05-21 ASSESSMENT — COLUMBIA-SUICIDE SEVERITY RATING SCALE - C-SSRS
1. IN THE PAST MONTH, HAVE YOU WISHED YOU WERE DEAD OR WISHED YOU COULD GO TO SLEEP AND NOT WAKE UP?: NO
6. HAVE YOU EVER DONE ANYTHING, STARTED TO DO ANYTHING, OR PREPARED TO DO ANYTHING TO END YOUR LIFE?: NO
2. HAVE YOU ACTUALLY HAD ANY THOUGHTS OF KILLING YOURSELF?: NO

## 2024-05-21 ASSESSMENT — PAIN - FUNCTIONAL ASSESSMENT
PAIN_FUNCTIONAL_ASSESSMENT: 0-10
PAIN_FUNCTIONAL_ASSESSMENT: VAS (VISUAL ANALOG SCALE)
PAIN_FUNCTIONAL_ASSESSMENT: VAS (VISUAL ANALOG SCALE)

## 2024-05-21 ASSESSMENT — PAIN SCALES - GENERAL
PAINLEVEL_OUTOF10: 0 - NO PAIN
PAINLEVEL_OUTOF10: 8
PAINLEVEL_OUTOF10: 0 - NO PAIN

## 2024-05-21 NOTE — OP NOTE
"Left Ankle Total Arthroplasty (L) Operative Note     Date: 2024  OR Location: [facility] A OR    Name: Delaney Ch \"Lee\", : 1955, Age: 68 y.o., MRN: [id number], Sex: female    Diagnosis  Pre-op Diagnosis     * Traumatic arthritis of foot, unspecified laterality [M12.579] Post-op Diagnosis     * Traumatic arthritis of foot, unspecified laterality [M12.579]     Procedures  Left Ankle Total Arthroplasty  01940 - UT ARTHROPLASTY ANKLE W/IMPLANT      Surgeons      * Jet Ferguson - Primary    Resident/Fellow/Other Assistant:  Surgeons and Role:  * No surgeons found with a matching role *    Procedure Summary  Anesthesia: Regional  ASA: II  Anesthesia Staff: Anesthesiologist: Jesus Stephenson MD  CRNA: Norberto Steven, APRN-CNP, APRN-CRNA  C-AA: DEB Padilla; DEB Pineda  Estimated Blood Loss: 0 mL  Intra-op Medications:   Administrations occurring from 1330 to 1530 on 24:   Medication Name Total Dose   sodium chloride 0.9 % irrigation solution 1,000 mL   lactated Ringer's infusion Cannot be calculated              Anesthesia Record               Intraprocedure I/O Totals          Intake    Tranexamic Acid 0.00 mL    The total shown is the total volume documented since Anesthesia Start was filed.    Total Intake 0 mL          Specimen: No specimens collected     Staff:   Circulator: Kadie Thomas RN  Relief Circulator: Mehnaz Cruz RN  Relief Scrub: Omar Gusman  Scrub Person: Selena Post         Drains and/or Catheters: * None in log *    Tourniquet Times:   * Missing tourniquet times found for documented tourniquets in lo *     Implants:  Implants       Type Name Action Serial No.      Joint TIBIAL COMPONENTS, CEMENTLESS, 32.5MM X 35MM, MEDIUM - [id number] Implanted      Implant TALAR COMPONET, SINGLE COATED, US, X-SMALL, LEFT - [id number] Implanted      Joint SLIDING CORE, UHMPWE, 7MM, SS - [id number] Implanted               Findings: Posttraumatic arthritis left " ankle    Indications: Lee Ch is an 68 y.o. female who is having surgery for Traumatic arthritis of foot, unspecified laterality [M12.579].  Posttraumatic arthritis left ankle status post remote tibia fracture.  Here for total ankle replacement.    The patient was seen in the preoperative area. The risks, benefits, complications, treatment options, non-operative alternatives, expected recovery and outcomes were discussed with the patient. The possibilities of reaction to medication, pulmonary aspiration, injury to surrounding structures, bleeding, recurrent infection, the need for additional procedures, failure to diagnose a condition, and creating a complication requiring transfusion or operation were discussed with the patient. The patient concurred with the proposed plan, giving informed consent.  The site of surgery was properly noted/marked if necessary per policy. The patient has been actively warmed in preoperative area. Preoperative antibiotics have been ordered and given within 1 hours of incision. Venous thrombosis prophylaxis are not indicated.    Procedure Details:   Preop diagnosis: End-stage posttraumatic arthritis left ankle  Postop diagnosis: Same.  Procedure: Left total ankle replacement  Surgeon: Jet Ferguson MD  Assistant: Randy Santillan DO  Anesthesia: General and regional.  Clinical note: 68 year-old female end-stage posttraumatic arthritis left ankle.  Failed conservative treatment.  Daily functional pain.  Offered total ankle replacement.  Understood risk of surgery.  Bleeding, infection, loosening, fracture, DVT, possible need for revision.  Understood these risks and wished to proceed.  Operative note: Patient was brought to the operating room.  Regional anesthesia and general per anesthesia staff.   Was given IV antibiotics and 1 g of TXA.  Left leg was prepped and draped local sterile fashion with the tourniquet on the thigh.  Leg was exsanguinated and tourniquet inflated to 325 mmg Hg.  Anterior approach to the ankle was performed.  Incision through skin down to the extensor retinaculum.  Superficial veins were cauterized.  Retinaculum was incised lateral to the anterior tib tendon.  Neurovascular bundles protected.Anterior capsule was excised.  Grade 4 changes of the ankle joint was noted.  C-arm images was used throughout the case. Star Ankle system was used.  Guidepin was placed in the proximal tibia.  Distal tibia cuts were made.  Sized for a medium tibia.  Proximal talar cut was made.  Sized for a extra small  talus.  Talar cuts were made.  Trial fit nicely.  Central keel cut was performed.  A talar component was impacted in place with good fixation and good alignment.  Distal tibial barrel cuts were performed. Tibial implant was impacted in place good fixation.  Reduced with a 7 mm trial poly spacer.  Good flexibility and stability.  Trial poly-was removed and 7 mm spacer was placed with good flexibility and stability.  Ankle was irrigated.  Bone graft was placed on the distal tibial barrel holes.  The retinaculum was repaired with interrupted suture.  Wounds closed in layers.  Posterior splint sugar tong was applied.  Patient procedure well and was taken to the recovery room stable condition.  Complications:  None; patient tolerated the procedure well.    Disposition: PACU - hemodynamically stable.  Condition: stable         Additional Details: None    Attending Attestation:     Jet Ferguson  Phone Number: [phone]

## 2024-05-21 NOTE — ANESTHESIA POSTPROCEDURE EVALUATION
"Patient: Delaney Ch \"Pegg\"    Procedure Summary       Date: 05/21/24 Room / Location: [facility] OR 09 / [facility] OR    Anesthesia Start: 1340 Anesthesia Stop: 1555    Procedure: Left Ankle Total Arthroplasty (Left: Ankle) Diagnosis:       Traumatic arthritis of foot, unspecified laterality      (Traumatic arthritis of foot, unspecified laterality [M12.579])    Surgeons: Jet Ferguson MD Responsible Provider: Jesus Stephenson MD    Anesthesia Type: general ASA Status: 2            Anesthesia Type: general    Vitals Value Taken Time   /82 05/21/24 1601   Temp 36.4 °C (97.5 °F) 05/21/24 1552   Pulse 84 05/21/24 1609   Resp 13 05/21/24 1600   SpO2 92 % 05/21/24 1609   Vitals shown include unfiled device data.    Anesthesia Post Evaluation    Patient participation: complete - patient participated  Level of consciousness: awake  Pain management: adequate  Airway patency: patent  Cardiovascular status: acceptable and hemodynamically stable  Respiratory status: acceptable  Hydration status: acceptable  Postoperative Nausea and Vomiting: none        No notable events documented.    "

## 2024-05-21 NOTE — POST-PROCEDURE NOTE
1645 Family at bedside/ get dressed with family    1650 Discharge instruction reviewed with pt and family    1700 IV removed    1702 Pt on wheelchair with nurse to toilet and void without issues    1713 Med to bed pharm at bedside

## 2024-05-21 NOTE — ANESTHESIA PROCEDURE NOTES
Peripheral Block    Patient location during procedure: pre-op  Start time: 5/21/2024 12:30 PM  End time: 5/21/2024 12:40 PM  Reason for block: at surgeon's request and post-op pain management  Staffing  Performed: attending   Authorized by: Jesus Stephenson MD    Performed by: Jesus Stephenson MD  Preanesthetic Checklist  Completed: patient identified, IV checked, site marked, risks and benefits discussed, surgical consent, monitors and equipment checked, pre-op evaluation and timeout performed   Timeout performed at: 5/21/2024 12:30 PM  Peripheral Block  Patient position: laying flat  Prep: ChloraPrep  Patient monitoring: heart rate and continuous pulse ox  Block type: popliteal and adductor canal  Laterality: left  Injection technique: single-shot  Guidance: ultrasound guided  Local infiltration: lidocaine  Infiltration strength: 1 %  Dose: 2 mL  Needle  Needle gauge: 21 G  Needle length: 8 cm  Needle localization: ultrasound guidance     image stored in chart  Assessment  Injection assessment: negative aspiration for heme, no paresthesia on injection, incremental injection and local visualized surrounding nerve on ultrasound

## 2024-05-21 NOTE — ANESTHESIA PREPROCEDURE EVALUATION
"Patient: Delaney Ch \"Pegg\"    Procedure Information       Date/Time: 05/21/24 1330    Procedure: Left Ankle Total Arthroplasty (Left: Ankle)    Location: [facility] A OR 09 / [facility] A OR    Surgeons: Jet Ferguson MD          69 yo hx Afib s/p cardioversion (stopped eliquis; had Lovenox per cardiology yesterday morning), HTN, Raynauds, hypothyroid, prior back surgery.    Relevant Problems   Cardiac   (+) Atrial fibrillation (Multi)   (+) Essential hypertension   (+) Hyperlipidemia, mixed   (+) Longstanding persistent atrial fibrillation (Multi)   (+) PAC (premature atrial contraction)      Neuro   (+) Anxiety      /Renal   (+) Hyponatremia      Endocrine   (+) Hypothyroidism, adult      Musculoskeletal   (+) Lumbar spondylosis   (+) Scoliosis   (+) Spinal stenosis of lumbar region      ID   (+) Influenza A       Clinical information reviewed:   Tobacco  Allergies  Meds   Med Hx  Surg Hx   Fam Hx  Soc Hx        NPO Detail:  NPO/Void Status  Date of Last Liquid: 05/21/24  Time of Last Liquid: 0700  Date of Last Solid: 05/20/24  Time of Last Solid: 1930         Physical Exam    Airway  Mallampati: II  TM distance: >3 FB  Neck ROM: full     Cardiovascular   Rate: normal     Dental    Pulmonary - normal exam     Abdominal            Anesthesia Plan    History of general anesthesia?: yes  History of complications of general anesthesia?: no    ASA 2     general     intravenous induction   Postoperative administration of opioids is intended.  Anesthetic plan and risks discussed with patient.      "

## 2024-05-21 NOTE — ANESTHESIA PROCEDURE NOTES
Airway  Date/Time: 5/21/2024 1:48 PM  Urgency: elective    Airway not difficult    Staffing  Performed: DEB   Authorized by: Jesus Stephenson MD    Performed by: DEB Pineda  Patient location during procedure: OR    Indications and Patient Condition  Indications for airway management: anesthesia  Spontaneous Ventilation: absent  Sedation level: deep  Preoxygenated: yes  Patient position: sniffing  Mask difficulty assessment: 1 - vent by mask  No planned trial extubation    Final Airway Details  Final airway type: endotracheal airway      Successful airway: ETT  Cuffed: yes   Successful intubation technique: direct laryngoscopy  Endotracheal tube insertion site: oral  Blade: Amelia  Blade size: #3  ETT size (mm): 7.0  Cormack-Lehane Classification: grade I - full view of glottis  Placement verified by: chest auscultation and capnometry   Cuff volume (mL): 7  Measured from: lips  ETT to lips (cm): 22  Number of attempts at approach: 1

## 2024-05-22 ASSESSMENT — PAIN SCALES - GENERAL: PAINLEVEL_OUTOF10: 0 - NO PAIN

## 2024-05-28 DIAGNOSIS — I49.1 PAC (PREMATURE ATRIAL CONTRACTION): ICD-10-CM

## 2024-05-28 DIAGNOSIS — I48.91 ATRIAL FIBRILLATION, UNSPECIFIED TYPE (MULTI): Primary | ICD-10-CM

## 2024-05-28 RX ORDER — APIXABAN 5 MG/1
5 TABLET, FILM COATED ORAL 2 TIMES DAILY
Qty: 180 TABLET | Refills: 3 | Status: SHIPPED | OUTPATIENT
Start: 2024-05-28 | End: 2025-05-28

## 2024-05-28 NOTE — TELEPHONE ENCOUNTER
Received request for prescription refill for patient.  Patient follows with Dr. Petra Sequeira MD, FACC, FACP, RS     Request is for eliquis  Is patient currently on medication- yes    Last OV- 5/10/24  Next OV- 7/11/24    Pended for signing and sent to provider.

## 2024-05-29 ENCOUNTER — PATIENT OUTREACH (OUTPATIENT)
Dept: PRIMARY CARE | Facility: CLINIC | Age: 69
End: 2024-05-29
Payer: MEDICARE

## 2024-05-29 NOTE — PROGRESS NOTES
Patient has met target of no readmission for (90) days post (hospital, SNF, rehab) discharge and is graduated from Transitional Care Management program at this time.    Hao Jordan LPN

## 2024-06-06 ENCOUNTER — HOSPITAL ENCOUNTER (OUTPATIENT)
Dept: RADIOLOGY | Facility: CLINIC | Age: 69
Discharge: HOME | End: 2024-06-06
Payer: MEDICARE

## 2024-06-06 ENCOUNTER — OFFICE VISIT (OUTPATIENT)
Dept: ORTHOPEDIC SURGERY | Facility: CLINIC | Age: 69
End: 2024-06-06
Payer: MEDICARE

## 2024-06-06 DIAGNOSIS — Z96.662 H/O TOTAL ANKLE REPLACEMENT, LEFT: ICD-10-CM

## 2024-06-06 PROCEDURE — 73610 X-RAY EXAM OF ANKLE: CPT | Mod: LEFT SIDE | Performed by: STUDENT IN AN ORGANIZED HEALTH CARE EDUCATION/TRAINING PROGRAM

## 2024-06-06 PROCEDURE — L4361 PNEUMA/VAC WALK BOOT PRE OTS: HCPCS | Performed by: ORTHOPAEDIC SURGERY

## 2024-06-06 PROCEDURE — 1123F ACP DISCUSS/DSCN MKR DOCD: CPT | Performed by: ORTHOPAEDIC SURGERY

## 2024-06-06 PROCEDURE — 1036F TOBACCO NON-USER: CPT | Performed by: ORTHOPAEDIC SURGERY

## 2024-06-06 PROCEDURE — 99024 POSTOP FOLLOW-UP VISIT: CPT | Performed by: ORTHOPAEDIC SURGERY

## 2024-06-06 PROCEDURE — 73610 X-RAY EXAM OF ANKLE: CPT | Mod: LT

## 2024-06-06 PROCEDURE — 1159F MED LIST DOCD IN RCRD: CPT | Performed by: ORTHOPAEDIC SURGERY

## 2024-06-06 PROCEDURE — 1157F ADVNC CARE PLAN IN RCRD: CPT | Performed by: ORTHOPAEDIC SURGERY

## 2024-06-06 PROCEDURE — 3008F BODY MASS INDEX DOCD: CPT | Performed by: ORTHOPAEDIC SURGERY

## 2024-06-06 RX ORDER — TRAMADOL HYDROCHLORIDE 50 MG/1
50 TABLET ORAL EVERY 6 HOURS PRN
Qty: 15 TABLET | Refills: 0 | Status: SHIPPED | OUTPATIENT
Start: 2024-06-06 | End: 2024-06-20

## 2024-06-06 ASSESSMENT — PAIN - FUNCTIONAL ASSESSMENT: PAIN_FUNCTIONAL_ASSESSMENT: NO/DENIES PAIN

## 2024-06-06 NOTE — PROGRESS NOTES
S: Pain well controlled.  Has been weightbearing in the splint the last few days without any pain.    O: Incisions healing nicely. Good alignment.  No pain with active ankle motion.    XR: I personally reviewed the following radiographic exams: Left ankle shows well-fixed well aligned total ankle.    A: S/P left total ankle replacement.    P: Ace wrap compression.  Progress weightbearing as tolerated in boot.  Begin formal physical therapy.  Recommend wearing the boot for 2 weeks and then weaning to regular shoes.  Follow-up x-ray left ankle in 4 weeks.  Can take intermittent Advil as needed.  Will give tramadol for pain as she begins therapy.

## 2024-06-13 ENCOUNTER — TELEPHONE (OUTPATIENT)
Dept: CARDIOLOGY | Facility: CLINIC | Age: 69
End: 2024-06-13
Payer: MEDICARE

## 2024-06-13 DIAGNOSIS — I48.11 LONGSTANDING PERSISTENT ATRIAL FIBRILLATION (MULTI): Primary | ICD-10-CM

## 2024-06-13 NOTE — TELEPHONE ENCOUNTER
Patient called and stated she is supposed to have a cardioversion on 7/11. She stated she had and ankle replacement done and they told her she was not in a fib and has not been in afib since. Patient wants to know if she need to come in for an EKG to verify she is not in a fib anymore. Patient stated she feels a lot better. Routed to Angela Epps RN

## 2024-06-18 ENCOUNTER — ANCILLARY PROCEDURE (OUTPATIENT)
Dept: CARDIOLOGY | Facility: CLINIC | Age: 69
End: 2024-06-18
Payer: MEDICARE

## 2024-06-18 ENCOUNTER — APPOINTMENT (OUTPATIENT)
Dept: WOUND CARE | Facility: CLINIC | Age: 69
End: 2024-06-18
Payer: MEDICARE

## 2024-06-18 DIAGNOSIS — I48.11 LONGSTANDING PERSISTENT ATRIAL FIBRILLATION (MULTI): ICD-10-CM

## 2024-06-18 PROCEDURE — 93000 ELECTROCARDIOGRAM COMPLETE: CPT | Performed by: INTERNAL MEDICINE

## 2024-06-18 NOTE — PROGRESS NOTES
Pt here for EKG ordered by Luz Alejandra, CNP for afib. Pt scheduled for cardioversion on 7/11/24 but thinks she is back in NSR. Per Dr. Sequeira, pt in NSR, cardioversion can be cancelled.

## 2024-06-28 ENCOUNTER — APPOINTMENT (OUTPATIENT)
Dept: WOUND CARE | Facility: CLINIC | Age: 69
End: 2024-06-28
Payer: MEDICARE

## 2024-07-02 ENCOUNTER — LAB REQUISITION (OUTPATIENT)
Dept: LAB | Facility: HOSPITAL | Age: 69
End: 2024-07-02
Payer: MEDICARE

## 2024-07-02 ENCOUNTER — OFFICE VISIT (OUTPATIENT)
Dept: WOUND CARE | Facility: CLINIC | Age: 69
End: 2024-07-02
Payer: MEDICARE

## 2024-07-02 DIAGNOSIS — I73.00 RAYNAUD'S SYNDROME WITHOUT GANGRENE: ICD-10-CM

## 2024-07-02 DIAGNOSIS — T81.31XA DISRUPTION OF EXTERNAL OPERATION (SURGICAL) WOUND, NOT ELSEWHERE CLASSIFIED, INITIAL ENCOUNTER: ICD-10-CM

## 2024-07-02 DIAGNOSIS — L98.492 NON-PRESSURE CHRONIC ULCER OF SKIN OF OTHER SITES WITH FAT LAYER EXPOSED (MULTI): ICD-10-CM

## 2024-07-02 PROCEDURE — 99213 OFFICE O/P EST LOW 20 MIN: CPT

## 2024-07-02 PROCEDURE — 87075 CULTR BACTERIA EXCEPT BLOOD: CPT | Mod: OUT,ELYLAB | Performed by: SURGERY

## 2024-07-02 PROCEDURE — 11043 DBRDMT MUSC&/FSCA 1ST 20/<: CPT

## 2024-07-02 PROCEDURE — 99213 OFFICE O/P EST LOW 20 MIN: CPT | Performed by: SURGERY

## 2024-07-04 LAB
BACTERIA SPEC CULT: NORMAL
GRAM STN SPEC: NORMAL
GRAM STN SPEC: NORMAL

## 2024-07-05 ENCOUNTER — CLINICAL SUPPORT (OUTPATIENT)
Dept: WOUND CARE | Facility: CLINIC | Age: 69
End: 2024-07-05
Payer: MEDICARE

## 2024-07-05 PROCEDURE — 97605 NEG PRS WND THER DME<=50SQCM: CPT

## 2024-07-09 ENCOUNTER — OFFICE VISIT (OUTPATIENT)
Dept: WOUND CARE | Facility: CLINIC | Age: 69
End: 2024-07-09
Payer: MEDICARE

## 2024-07-09 PROCEDURE — 11042 DBRDMT SUBQ TIS 1ST 20SQCM/<: CPT

## 2024-07-09 PROCEDURE — 97605 NEG PRS WND THER DME<=50SQCM: CPT | Performed by: SURGERY

## 2024-07-09 PROCEDURE — 11042 DBRDMT SUBQ TIS 1ST 20SQCM/<: CPT | Performed by: SURGERY

## 2024-07-09 PROCEDURE — 97605 NEG PRS WND THER DME<=50SQCM: CPT

## 2024-07-11 ENCOUNTER — APPOINTMENT (OUTPATIENT)
Dept: ORTHOPEDIC SURGERY | Facility: CLINIC | Age: 69
End: 2024-07-11
Payer: MEDICARE

## 2024-07-11 ENCOUNTER — APPOINTMENT (OUTPATIENT)
Dept: CARDIOLOGY | Facility: HOSPITAL | Age: 69
End: 2024-07-11
Payer: MEDICARE

## 2024-07-16 ENCOUNTER — OFFICE VISIT (OUTPATIENT)
Dept: WOUND CARE | Facility: CLINIC | Age: 69
End: 2024-07-16
Payer: MEDICARE

## 2024-07-16 PROCEDURE — 11042 DBRDMT SUBQ TIS 1ST 20SQCM/<: CPT | Performed by: SURGERY

## 2024-07-16 PROCEDURE — 11042 DBRDMT SUBQ TIS 1ST 20SQCM/<: CPT

## 2024-07-18 ENCOUNTER — HOSPITAL ENCOUNTER (OUTPATIENT)
Dept: RADIOLOGY | Facility: CLINIC | Age: 69
Discharge: HOME | End: 2024-07-18
Payer: MEDICARE

## 2024-07-18 ENCOUNTER — APPOINTMENT (OUTPATIENT)
Dept: ORTHOPEDIC SURGERY | Facility: CLINIC | Age: 69
End: 2024-07-18
Payer: MEDICARE

## 2024-07-18 DIAGNOSIS — Z96.662 H/O TOTAL ANKLE REPLACEMENT, LEFT: Primary | ICD-10-CM

## 2024-07-18 DIAGNOSIS — M12.579 TRAUMATIC ARTHRITIS OF ANKLE, UNSPECIFIED LATERALITY: ICD-10-CM

## 2024-07-18 DIAGNOSIS — Z96.662 H/O TOTAL ANKLE REPLACEMENT, LEFT: ICD-10-CM

## 2024-07-18 DIAGNOSIS — T81.30XA WOUND DEHISCENCE: ICD-10-CM

## 2024-07-18 PROCEDURE — 1123F ACP DISCUSS/DSCN MKR DOCD: CPT | Performed by: ORTHOPAEDIC SURGERY

## 2024-07-18 PROCEDURE — 1157F ADVNC CARE PLAN IN RCRD: CPT | Performed by: ORTHOPAEDIC SURGERY

## 2024-07-18 PROCEDURE — 1159F MED LIST DOCD IN RCRD: CPT | Performed by: ORTHOPAEDIC SURGERY

## 2024-07-18 PROCEDURE — 1036F TOBACCO NON-USER: CPT | Performed by: ORTHOPAEDIC SURGERY

## 2024-07-18 PROCEDURE — 99211 OFF/OP EST MAY X REQ PHY/QHP: CPT | Performed by: ORTHOPAEDIC SURGERY

## 2024-07-18 PROCEDURE — 73610 X-RAY EXAM OF ANKLE: CPT | Mod: LT

## 2024-07-18 ASSESSMENT — PAIN - FUNCTIONAL ASSESSMENT: PAIN_FUNCTIONAL_ASSESSMENT: NO/DENIES PAIN

## 2024-07-18 NOTE — PROGRESS NOTES
Returns for left ankle.  Having no pain.  Working with the wound clinic at [place] with a wound VAC in place.  Has had no infection.  Feels she lost some of her range of motion during the last few weeks.    Exam: Good plantarflexion strength.  Dorsiflexion just past neutral.  Good inversion eversion without pain.    Clinical picture shows granulating wound anteriorly.    I personally reviewed the following radiographic exams: Left ankle shows well-fixed well aligned total ankle.  No lucency.    Assessment: Status post left total ankle with healing wound dehiscence.    Plan: Continue to walk to work on range of motion.  No restriction from our standpoint.  Will follow-up after wound VAC to consider formal therapy.

## 2024-07-22 ENCOUNTER — TELEPHONE (OUTPATIENT)
Dept: PRIMARY CARE | Facility: CLINIC | Age: 69
End: 2024-07-22
Payer: MEDICARE

## 2024-07-22 NOTE — TELEPHONE ENCOUNTER
Patient called and left VM asking when her upcoming appt with PCP is in August. Left patient a VM letting her know no upcoming appt is made with PCP. We can squeeze patient in for MW on 9/10 at 8am and to call back if that would work

## 2024-07-23 ENCOUNTER — OFFICE VISIT (OUTPATIENT)
Dept: WOUND CARE | Facility: CLINIC | Age: 69
End: 2024-07-23
Payer: MEDICARE

## 2024-07-23 PROCEDURE — 11042 DBRDMT SUBQ TIS 1ST 20SQCM/<: CPT

## 2024-07-23 PROCEDURE — 11042 DBRDMT SUBQ TIS 1ST 20SQCM/<: CPT | Performed by: SURGERY

## 2024-07-30 ENCOUNTER — OFFICE VISIT (OUTPATIENT)
Dept: WOUND CARE | Facility: CLINIC | Age: 69
End: 2024-07-30
Payer: MEDICARE

## 2024-07-30 ENCOUNTER — TELEPHONE (OUTPATIENT)
Dept: PRIMARY CARE | Facility: CLINIC | Age: 69
End: 2024-07-30

## 2024-07-30 DIAGNOSIS — M85.9 DISORDER OF BONE DENSITY AND STRUCTURE, UNSPECIFIED: ICD-10-CM

## 2024-07-30 PROCEDURE — 11043 DBRDMT MUSC&/FSCA 1ST 20/<: CPT | Performed by: SURGERY

## 2024-07-30 PROCEDURE — 11043 DBRDMT MUSC&/FSCA 1ST 20/<: CPT

## 2024-07-30 RX ORDER — DENOSUMAB 60 MG/ML
60 INJECTION SUBCUTANEOUS
Qty: 1 ML | Refills: 1 | Status: SHIPPED | OUTPATIENT
Start: 2024-07-30

## 2024-07-30 NOTE — TELEPHONE ENCOUNTER
Spoke with Rigo,  at [facility] and he stated that there is no  protocol regarding giving Prolia only at the infusion center or doctors office.  He stated he has people who pick it and take it to there Dr office or just give it to themselves.

## 2024-07-30 NOTE — TELEPHONE ENCOUNTER
Pt left vm for prolia refill  Do we send this to local for her ?  Looks like we did in October  Left pt vm to call back to clarify

## 2024-07-30 NOTE — TELEPHONE ENCOUNTER
Noted!  We could probably do this also for some of our patients, keep that in mind  FRANK Barnett when she returns

## 2024-08-06 ENCOUNTER — OFFICE VISIT (OUTPATIENT)
Dept: WOUND CARE | Facility: CLINIC | Age: 69
End: 2024-08-06
Payer: MEDICARE

## 2024-08-06 PROCEDURE — 11042 DBRDMT SUBQ TIS 1ST 20SQCM/<: CPT

## 2024-08-06 PROCEDURE — 11042 DBRDMT SUBQ TIS 1ST 20SQCM/<: CPT | Performed by: SURGERY

## 2024-08-13 ENCOUNTER — OFFICE VISIT (OUTPATIENT)
Dept: WOUND CARE | Facility: CLINIC | Age: 69
End: 2024-08-13
Payer: MEDICARE

## 2024-08-13 PROCEDURE — 11042 DBRDMT SUBQ TIS 1ST 20SQCM/<: CPT

## 2024-08-13 PROCEDURE — 11042 DBRDMT SUBQ TIS 1ST 20SQCM/<: CPT | Performed by: SURGERY

## 2024-08-20 ENCOUNTER — OFFICE VISIT (OUTPATIENT)
Dept: WOUND CARE | Facility: CLINIC | Age: 69
End: 2024-08-20
Payer: MEDICARE

## 2024-08-20 PROCEDURE — 11043 DBRDMT MUSC&/FSCA 1ST 20/<: CPT

## 2024-08-23 ENCOUNTER — APPOINTMENT (OUTPATIENT)
Dept: ORTHOPEDIC SURGERY | Facility: CLINIC | Age: 69
End: 2024-08-23
Payer: MEDICARE

## 2024-08-27 ENCOUNTER — OFFICE VISIT (OUTPATIENT)
Dept: WOUND CARE | Facility: CLINIC | Age: 69
End: 2024-08-27
Payer: MEDICARE

## 2024-08-27 PROCEDURE — 15271 SKIN SUB GRAFT TRNK/ARM/LEG: CPT | Performed by: SURGERY

## 2024-08-27 PROCEDURE — 15271 SKIN SUB GRAFT TRNK/ARM/LEG: CPT

## 2024-09-03 ENCOUNTER — OFFICE VISIT (OUTPATIENT)
Dept: WOUND CARE | Facility: CLINIC | Age: 69
End: 2024-09-03
Payer: MEDICARE

## 2024-09-03 PROCEDURE — 15271 SKIN SUB GRAFT TRNK/ARM/LEG: CPT | Performed by: SURGERY

## 2024-09-03 PROCEDURE — 15271 SKIN SUB GRAFT TRNK/ARM/LEG: CPT

## 2024-09-05 ENCOUNTER — APPOINTMENT (OUTPATIENT)
Dept: ORTHOPEDIC SURGERY | Facility: CLINIC | Age: 69
End: 2024-09-05
Payer: MEDICARE

## 2024-09-05 DIAGNOSIS — M19.039 WRIST ARTHRITIS: Primary | ICD-10-CM

## 2024-09-05 PROCEDURE — 1159F MED LIST DOCD IN RCRD: CPT | Performed by: STUDENT IN AN ORGANIZED HEALTH CARE EDUCATION/TRAINING PROGRAM

## 2024-09-05 PROCEDURE — 20605 DRAIN/INJ JOINT/BURSA W/O US: CPT | Performed by: STUDENT IN AN ORGANIZED HEALTH CARE EDUCATION/TRAINING PROGRAM

## 2024-09-05 PROCEDURE — 1036F TOBACCO NON-USER: CPT | Performed by: STUDENT IN AN ORGANIZED HEALTH CARE EDUCATION/TRAINING PROGRAM

## 2024-09-05 PROCEDURE — 1123F ACP DISCUSS/DSCN MKR DOCD: CPT | Performed by: STUDENT IN AN ORGANIZED HEALTH CARE EDUCATION/TRAINING PROGRAM

## 2024-09-05 PROCEDURE — 1157F ADVNC CARE PLAN IN RCRD: CPT | Performed by: STUDENT IN AN ORGANIZED HEALTH CARE EDUCATION/TRAINING PROGRAM

## 2024-09-05 PROCEDURE — 99213 OFFICE O/P EST LOW 20 MIN: CPT | Performed by: STUDENT IN AN ORGANIZED HEALTH CARE EDUCATION/TRAINING PROGRAM

## 2024-09-05 RX ORDER — LIDOCAINE HYDROCHLORIDE 10 MG/ML
1 INJECTION INFILTRATION; PERINEURAL
Status: COMPLETED | OUTPATIENT
Start: 2024-09-05 | End: 2024-09-05

## 2024-09-05 NOTE — PROGRESS NOTES
History of Present Illness  Patient returns today for evaluation of right radiocarpal arthritis.  History of injection 11/14/2023.  Recurrence of pain over the past few months..     Physical Examination:  Right upper extremity   The patient appears to be their stated age, is in no apparent distress, and is oriented x3. The patients mood and affect are appropriate. The patients gait is normal. The examination of the limb in question was performed in comparison to the contralateral limb.    On musculoskeletal examination, pain over the right radiocarpal joint.  Minimal pain over the right thumb CMC.:    Sensation and motor function are intact in the radial, and median nerve distribution. There is no obvious thenar atrophy, and thenar strength is 5/5. There is no intrinsic atrophy, and intrinsic strength is 5/5.  The patient can make a full composite fist. The hand itself is warm and well perfused. The skin is intact throughout. The contralateral hand/wrist are normal to inspection, range of motion, stability, and strength.    M Inj/Asp: R radiocarpal on 9/5/2024 12:37 PM  Indications: pain  Details: 24 G needle, volar approach  Medications: 10 mg triamcinolone acetonide 10 mg/mL; 1 mL lidocaine 10 mg/mL (1 %)  Outcome: tolerated well, no immediate complications  Procedure, treatment alternatives, risks and benefits explained, specific risks discussed. Consent was given by the patient. Immediately prior to procedure a time out was called to verify the correct patient, procedure, equipment, support staff and site/side marked as required. Patient was prepped and draped in the usual sterile fashion.             Assessment:  Patient with recurrent right wrist arthritis.  Interested in repeat wrist injection today.    Plan:   Injection.  I explained the risks and benefits of an injection. Specifically, I reviewed the risks of injection, which include, but are not limited to, infection, bleeding, nerve injury, pain,  steroid flare, glycemic alteration, subcutaneous fat atrophy, skin hypopigmentation, soft tissue damage, and incomplete symptom relief. At this time, the patient would like to proceed with an injection. After obtaining consent, I injected a 1mL combination of Kenalog and 1% lidocaine into right radiocarpal joint, sterile technique. The injection site was dressed, and the patient tolerated the injection well. I am hopeful that this injection will serve diagnostic, prognostic, and therapeutic purposes. Finally, I have emphasized patience, as any benefit may take several weeks to manifest. Depending on the success of the injection, I will see them back PRN      Dorina Finnegan MD

## 2024-09-06 ENCOUNTER — LAB (OUTPATIENT)
Dept: LAB | Facility: LAB | Age: 69
End: 2024-09-06
Payer: MEDICARE

## 2024-09-06 DIAGNOSIS — E55.9 VITAMIN D INSUFFICIENCY: ICD-10-CM

## 2024-09-06 DIAGNOSIS — E78.2 HYPERLIPIDEMIA, MIXED: ICD-10-CM

## 2024-09-06 DIAGNOSIS — E03.9 HYPOTHYROIDISM, ADULT: ICD-10-CM

## 2024-09-06 DIAGNOSIS — I48.91 ATRIAL FIBRILLATION, CONTROLLED (MULTI): ICD-10-CM

## 2024-09-06 PROBLEM — Z87.81 HISTORY OF ANKLE FRACTURE: Status: ACTIVE | Noted: 2024-02-29

## 2024-09-06 PROBLEM — Z78.9 NEVER SMOKED CIGARETTES: Status: RESOLVED | Noted: 2024-01-24 | Resolved: 2024-09-06

## 2024-09-06 PROBLEM — Z71.89 ENCOUNTER FOR MEDICATION REVIEW AND COUNSELING: Status: RESOLVED | Noted: 2024-05-10 | Resolved: 2024-09-06

## 2024-09-06 PROBLEM — J10.1 INFLUENZA A: Status: RESOLVED | Noted: 2024-02-29 | Resolved: 2024-09-06

## 2024-09-06 PROBLEM — Z71.89 ENCOUNTER TO DISCUSS TREATMENT OPTIONS: Status: RESOLVED | Noted: 2024-05-10 | Resolved: 2024-09-06

## 2024-09-06 PROBLEM — Z01.818 PRE-OPERATIVE CLEARANCE: Status: RESOLVED | Noted: 2024-05-08 | Resolved: 2024-09-06

## 2024-09-06 LAB
25(OH)D3 SERPL-MCNC: 70 NG/ML (ref 30–100)
ALBUMIN SERPL BCP-MCNC: 4 G/DL (ref 3.4–5)
ALP SERPL-CCNC: 72 U/L (ref 33–136)
ALT SERPL W P-5'-P-CCNC: 10 U/L (ref 7–45)
ANION GAP SERPL CALC-SCNC: 13 MMOL/L (ref 10–20)
APPEARANCE UR: ABNORMAL
AST SERPL W P-5'-P-CCNC: 21 U/L (ref 9–39)
BACTERIA #/AREA URNS AUTO: ABNORMAL /HPF
BASOPHILS # BLD AUTO: 0.05 X10*3/UL (ref 0–0.1)
BASOPHILS NFR BLD AUTO: 0.7 %
BILIRUB SERPL-MCNC: 1.2 MG/DL (ref 0–1.2)
BILIRUB UR STRIP.AUTO-MCNC: NEGATIVE MG/DL
BUN SERPL-MCNC: 10 MG/DL (ref 6–23)
CALCIUM SERPL-MCNC: 9.2 MG/DL (ref 8.6–10.3)
CHLORIDE SERPL-SCNC: 92 MMOL/L (ref 98–107)
CHOLEST SERPL-MCNC: 239 MG/DL (ref 0–199)
CHOLESTEROL/HDL RATIO: 2.1
CO2 SERPL-SCNC: 26 MMOL/L (ref 21–32)
COLOR UR: YELLOW
CREAT SERPL-MCNC: 0.62 MG/DL (ref 0.5–1.05)
EGFRCR SERPLBLD CKD-EPI 2021: >90 ML/MIN/1.73M*2
EOSINOPHIL # BLD AUTO: 0.08 X10*3/UL (ref 0–0.7)
EOSINOPHIL NFR BLD AUTO: 1.2 %
ERYTHROCYTE [DISTWIDTH] IN BLOOD BY AUTOMATED COUNT: 12.7 % (ref 11.5–14.5)
GLUCOSE SERPL-MCNC: 81 MG/DL (ref 74–99)
GLUCOSE UR STRIP.AUTO-MCNC: NORMAL MG/DL
HCT VFR BLD AUTO: 40 % (ref 36–46)
HDLC SERPL-MCNC: 116.1 MG/DL
HGB BLD-MCNC: 13.6 G/DL (ref 12–16)
IMM GRANULOCYTES # BLD AUTO: 0.03 X10*3/UL (ref 0–0.7)
IMM GRANULOCYTES NFR BLD AUTO: 0.4 % (ref 0–0.9)
KETONES UR STRIP.AUTO-MCNC: ABNORMAL MG/DL
LDLC SERPL CALC-MCNC: 113 MG/DL
LEUKOCYTE ESTERASE UR QL STRIP.AUTO: ABNORMAL
LYMPHOCYTES # BLD AUTO: 1.44 X10*3/UL (ref 1.2–4.8)
LYMPHOCYTES NFR BLD AUTO: 21.4 %
MCH RBC QN AUTO: 32.3 PG (ref 26–34)
MCHC RBC AUTO-ENTMCNC: 34 G/DL (ref 32–36)
MCV RBC AUTO: 95 FL (ref 80–100)
MONOCYTES # BLD AUTO: 0.74 X10*3/UL (ref 0.1–1)
MONOCYTES NFR BLD AUTO: 11 %
MUCOUS THREADS #/AREA URNS AUTO: ABNORMAL /LPF
NEUTROPHILS # BLD AUTO: 4.39 X10*3/UL (ref 1.2–7.7)
NEUTROPHILS NFR BLD AUTO: 65.3 %
NITRITE UR QL STRIP.AUTO: NEGATIVE
NON HDL CHOLESTEROL: 123 MG/DL (ref 0–149)
NRBC BLD-RTO: 0 /100 WBCS (ref 0–0)
PH UR STRIP.AUTO: 7.5 [PH]
PLATELET # BLD AUTO: 358 X10*3/UL (ref 150–450)
POTASSIUM SERPL-SCNC: 4.3 MMOL/L (ref 3.5–5.3)
PROT SERPL-MCNC: 6.5 G/DL (ref 6.4–8.2)
PROT UR STRIP.AUTO-MCNC: NEGATIVE MG/DL
RBC # BLD AUTO: 4.21 X10*6/UL (ref 4–5.2)
RBC # UR STRIP.AUTO: NEGATIVE /UL
RBC #/AREA URNS AUTO: ABNORMAL /HPF
SODIUM SERPL-SCNC: 127 MMOL/L (ref 136–145)
SP GR UR STRIP.AUTO: 1.01
SQUAMOUS #/AREA URNS AUTO: ABNORMAL /HPF
TRIGL SERPL-MCNC: 52 MG/DL (ref 0–149)
TSH SERPL-ACNC: 1.25 MIU/L (ref 0.44–3.98)
UROBILINOGEN UR STRIP.AUTO-MCNC: NORMAL MG/DL
VLDL: 10 MG/DL (ref 0–40)
WBC # BLD AUTO: 6.7 X10*3/UL (ref 4.4–11.3)
WBC #/AREA URNS AUTO: ABNORMAL /HPF

## 2024-09-06 PROCEDURE — 36415 COLL VENOUS BLD VENIPUNCTURE: CPT

## 2024-09-06 PROCEDURE — 80061 LIPID PANEL: CPT

## 2024-09-06 PROCEDURE — 81001 URINALYSIS AUTO W/SCOPE: CPT

## 2024-09-06 PROCEDURE — 82306 VITAMIN D 25 HYDROXY: CPT

## 2024-09-06 PROCEDURE — 80053 COMPREHEN METABOLIC PANEL: CPT

## 2024-09-06 PROCEDURE — 85025 COMPLETE CBC W/AUTO DIFF WBC: CPT

## 2024-09-06 PROCEDURE — 83930 ASSAY OF BLOOD OSMOLALITY: CPT

## 2024-09-06 PROCEDURE — 84443 ASSAY THYROID STIM HORMONE: CPT

## 2024-09-10 ENCOUNTER — APPOINTMENT (OUTPATIENT)
Dept: WOUND CARE | Facility: CLINIC | Age: 69
End: 2024-09-10
Payer: MEDICARE

## 2024-09-10 ENCOUNTER — APPOINTMENT (OUTPATIENT)
Dept: PRIMARY CARE | Facility: CLINIC | Age: 69
End: 2024-09-10
Payer: MEDICARE

## 2024-09-10 VITALS
DIASTOLIC BLOOD PRESSURE: 76 MMHG | BODY MASS INDEX: 22.02 KG/M2 | HEART RATE: 81 BPM | HEIGHT: 64 IN | TEMPERATURE: 96.6 F | OXYGEN SATURATION: 97 % | SYSTOLIC BLOOD PRESSURE: 133 MMHG | WEIGHT: 129 LBS

## 2024-09-10 DIAGNOSIS — Z13.6 SCREENING FOR CARDIOVASCULAR CONDITION: ICD-10-CM

## 2024-09-10 DIAGNOSIS — Z13.39 ALCOHOL SCREENING: ICD-10-CM

## 2024-09-10 DIAGNOSIS — Z78.0 MENOPAUSE: ICD-10-CM

## 2024-09-10 DIAGNOSIS — Z00.00 ROUTINE HEALTH MAINTENANCE: Primary | ICD-10-CM

## 2024-09-10 DIAGNOSIS — R93.1 ABNORMAL SCREENING CARDIAC CT: ICD-10-CM

## 2024-09-10 DIAGNOSIS — F41.9 ANXIETY: ICD-10-CM

## 2024-09-10 DIAGNOSIS — Z71.89 ADVANCE DIRECTIVE DISCUSSED WITH PATIENT: ICD-10-CM

## 2024-09-10 DIAGNOSIS — E87.1 HYPONATREMIA: ICD-10-CM

## 2024-09-10 DIAGNOSIS — R31.21 ASYMPTOMATIC MICROSCOPIC HEMATURIA: ICD-10-CM

## 2024-09-10 DIAGNOSIS — Z12.11 SCREENING FOR COLON CANCER: ICD-10-CM

## 2024-09-10 DIAGNOSIS — E03.9 HYPOTHYROIDISM, ADULT: ICD-10-CM

## 2024-09-10 DIAGNOSIS — Z12.4 SCREENING FOR CERVICAL CANCER: ICD-10-CM

## 2024-09-10 DIAGNOSIS — Z13.9 ENCOUNTER FOR SCREENING INVOLVING SOCIAL DETERMINANTS OF HEALTH (SDOH): ICD-10-CM

## 2024-09-10 DIAGNOSIS — E55.9 VITAMIN D INSUFFICIENCY: ICD-10-CM

## 2024-09-10 DIAGNOSIS — I48.11 LONGSTANDING PERSISTENT ATRIAL FIBRILLATION (MULTI): ICD-10-CM

## 2024-09-10 DIAGNOSIS — Z01.419 ENCOUNTER FOR GYNECOLOGICAL EXAMINATION WITHOUT ABNORMAL FINDING: ICD-10-CM

## 2024-09-10 DIAGNOSIS — M85.9 DISORDER OF BONE DENSITY AND STRUCTURE, UNSPECIFIED: ICD-10-CM

## 2024-09-10 DIAGNOSIS — Z12.31 ENCOUNTER FOR SCREENING MAMMOGRAM FOR BREAST CANCER: ICD-10-CM

## 2024-09-10 DIAGNOSIS — R19.00 PALPABLE ABDOMINAL MASS: ICD-10-CM

## 2024-09-10 DIAGNOSIS — Z71.89 CARDIAC RISK COUNSELING: ICD-10-CM

## 2024-09-10 PROBLEM — I10 ESSENTIAL HYPERTENSION: Status: RESOLVED | Noted: 2019-04-03 | Resolved: 2024-09-10

## 2024-09-10 PROBLEM — M12.579 TRAUMATIC ARTHRITIS OF FOOT: Status: RESOLVED | Noted: 2024-05-02 | Resolved: 2024-09-10

## 2024-09-10 LAB
APPEARANCE UR: ABNORMAL
BACTERIA #/AREA URNS AUTO: ABNORMAL /HPF
BILIRUB UR STRIP.AUTO-MCNC: NEGATIVE MG/DL
COLOR UR: YELLOW
GLUCOSE UR STRIP.AUTO-MCNC: NORMAL MG/DL
KETONES UR STRIP.AUTO-MCNC: ABNORMAL MG/DL
LEUKOCYTE ESTERASE UR QL STRIP.AUTO: ABNORMAL
MUCOUS THREADS #/AREA URNS AUTO: ABNORMAL /LPF
NITRITE UR QL STRIP.AUTO: NEGATIVE
OSMOLALITY SERPL: 268 MOSM/KG (ref 280–300)
PH UR STRIP.AUTO: 6.5 [PH]
PROT UR STRIP.AUTO-MCNC: NEGATIVE MG/DL
RBC # UR STRIP.AUTO: ABNORMAL /UL
RBC #/AREA URNS AUTO: ABNORMAL /HPF
SP GR UR STRIP.AUTO: 1.01
SQUAMOUS #/AREA URNS AUTO: ABNORMAL /HPF
UROBILINOGEN UR STRIP.AUTO-MCNC: NORMAL MG/DL
WBC #/AREA URNS AUTO: ABNORMAL /HPF

## 2024-09-10 PROCEDURE — G0136 PR ADM OF SOC DTR ASSESS 5-15 M: HCPCS | Performed by: INTERNAL MEDICINE

## 2024-09-10 PROCEDURE — 1159F MED LIST DOCD IN RCRD: CPT | Performed by: INTERNAL MEDICINE

## 2024-09-10 PROCEDURE — 3008F BODY MASS INDEX DOCD: CPT | Performed by: INTERNAL MEDICINE

## 2024-09-10 PROCEDURE — G0442 ANNUAL ALCOHOL SCREEN 15 MIN: HCPCS | Performed by: INTERNAL MEDICINE

## 2024-09-10 PROCEDURE — 87624 HPV HI-RISK TYP POOLED RSLT: CPT

## 2024-09-10 PROCEDURE — 99497 ADVNCD CARE PLAN 30 MIN: CPT | Performed by: INTERNAL MEDICINE

## 2024-09-10 PROCEDURE — 1036F TOBACCO NON-USER: CPT | Performed by: INTERNAL MEDICINE

## 2024-09-10 PROCEDURE — G0101 CA SCREEN;PELVIC/BREAST EXAM: HCPCS | Performed by: INTERNAL MEDICINE

## 2024-09-10 PROCEDURE — 99214 OFFICE O/P EST MOD 30 MIN: CPT | Performed by: INTERNAL MEDICINE

## 2024-09-10 PROCEDURE — 1160F RVW MEDS BY RX/DR IN RCRD: CPT | Performed by: INTERNAL MEDICINE

## 2024-09-10 PROCEDURE — 1123F ACP DISCUSS/DSCN MKR DOCD: CPT | Performed by: INTERNAL MEDICINE

## 2024-09-10 PROCEDURE — 81001 URINALYSIS AUTO W/SCOPE: CPT

## 2024-09-10 PROCEDURE — 1157F ADVNC CARE PLAN IN RCRD: CPT | Performed by: INTERNAL MEDICINE

## 2024-09-10 RX ORDER — ALPRAZOLAM 0.5 MG/1
0.5 TABLET, EXTENDED RELEASE ORAL AS NEEDED
Qty: 21 TABLET | Refills: 0 | Status: SHIPPED | OUTPATIENT
Start: 2024-09-10

## 2024-09-10 SDOH — ECONOMIC STABILITY: TRANSPORTATION INSECURITY
IN THE PAST 12 MONTHS, HAS LACK OF TRANSPORTATION KEPT YOU FROM MEETINGS, WORK, OR FROM GETTING THINGS NEEDED FOR DAILY LIVING?: NO

## 2024-09-10 SDOH — ECONOMIC STABILITY: FOOD INSECURITY: WITHIN THE PAST 12 MONTHS, YOU WORRIED THAT YOUR FOOD WOULD RUN OUT BEFORE YOU GOT MONEY TO BUY MORE.: NEVER TRUE

## 2024-09-10 SDOH — ECONOMIC STABILITY: INCOME INSECURITY: IN THE LAST 12 MONTHS, WAS THERE A TIME WHEN YOU WERE NOT ABLE TO PAY THE MORTGAGE OR RENT ON TIME?: NO

## 2024-09-10 SDOH — ECONOMIC STABILITY: FOOD INSECURITY: WITHIN THE PAST 12 MONTHS, THE FOOD YOU BOUGHT JUST DIDN'T LAST AND YOU DIDN'T HAVE MONEY TO GET MORE.: NEVER TRUE

## 2024-09-10 SDOH — ECONOMIC STABILITY: TRANSPORTATION INSECURITY
IN THE PAST 12 MONTHS, HAS THE LACK OF TRANSPORTATION KEPT YOU FROM MEDICAL APPOINTMENTS OR FROM GETTING MEDICATIONS?: NO

## 2024-09-10 ASSESSMENT — ANXIETY QUESTIONNAIRES
4. TROUBLE RELAXING: NOT AT ALL
6. BECOMING EASILY ANNOYED OR IRRITABLE: NOT AT ALL
GAD7 TOTAL SCORE: 1
5. BEING SO RESTLESS THAT IT IS HARD TO SIT STILL: NOT AT ALL
3. WORRYING TOO MUCH ABOUT DIFFERENT THINGS: NOT AT ALL
1. FEELING NERVOUS, ANXIOUS, OR ON EDGE: SEVERAL DAYS
2. NOT BEING ABLE TO STOP OR CONTROL WORRYING: NOT AT ALL
7. FEELING AFRAID AS IF SOMETHING AWFUL MIGHT HAPPEN: NOT AT ALL
IF YOU CHECKED OFF ANY PROBLEMS ON THIS QUESTIONNAIRE, HOW DIFFICULT HAVE THESE PROBLEMS MADE IT FOR YOU TO DO YOUR WORK, TAKE CARE OF THINGS AT HOME, OR GET ALONG WITH OTHER PEOPLE: NOT DIFFICULT AT ALL

## 2024-09-10 ASSESSMENT — SOCIAL DETERMINANTS OF HEALTH (SDOH): IN THE PAST 12 MONTHS, HAS THE ELECTRIC, GAS, OIL, OR WATER COMPANY THREATENED TO SHUT OFF SERVICE IN YOUR HOME?: NO

## 2024-09-10 ASSESSMENT — PATIENT HEALTH QUESTIONNAIRE - PHQ9
1. LITTLE INTEREST OR PLEASURE IN DOING THINGS: NOT AT ALL
2. FEELING DOWN, DEPRESSED OR HOPELESS: NOT AT ALL
SUM OF ALL RESPONSES TO PHQ9 QUESTIONS 1 AND 2: 0

## 2024-09-10 NOTE — ASSESSMENT & PLAN NOTE
Annual Wellness exam completed   Preventive Health History reviewed  Ordered:   Labs    Mammogram   BMD   Cscope   Shingles discussed, will get at pharmacy  PAP done (new partner)

## 2024-09-10 NOTE — PROGRESS NOTES
Annual Medicare Wellness Exam/Comprehensive Problem Focused Follow Up  HPI/CC  Chief Complaint   Patient presents with    Medicare Annual Wellness Visit Subsequent   She is doing well on her AFIB meds  Not in AFIB anymore  Sees EP still  Stopped lisinopril in 10/23    Labs reviewed:  Component      Latest Ref Rng 9/6/2024   WBC      4.4 - 11.3 x10*3/uL 6.7    nRBC      0.0 - 0.0 /100 WBCs 0.0    RBC      4.00 - 5.20 x10*6/uL 4.21    HEMOGLOBIN      12.0 - 16.0 g/dL 13.6    HEMATOCRIT      36.0 - 46.0 % 40.0    MCV      80 - 100 fL 95    MCH      26.0 - 34.0 pg 32.3    MCHC      32.0 - 36.0 g/dL 34.0    RED CELL DISTRIBUTION WIDTH      11.5 - 14.5 % 12.7    Platelets      150 - 450 x10*3/uL 358    Neutrophils %      40.0 - 80.0 % 65.3    Immature Granulocytes %, Automated      0.0 - 0.9 % 0.4    Lymphocytes %      13.0 - 44.0 % 21.4    Monocytes %      2.0 - 10.0 % 11.0    Eosinophils %      0.0 - 6.0 % 1.2    Basophils %      0.0 - 2.0 % 0.7    Neutrophils Absolute      1.20 - 7.70 x10*3/uL 4.39    Immature Granulocytes Absolute, Automated      0.00 - 0.70 x10*3/uL 0.03    Lymphocytes Absolute      1.20 - 4.80 x10*3/uL 1.44    Monocytes Absolute      0.10 - 1.00 x10*3/uL 0.74    Eosinophils Absolute      0.00 - 0.70 x10*3/uL 0.08    Basophils Absolute      0.00 - 0.10 x10*3/uL 0.05    GLUCOSE      74 - 99 mg/dL 81    SODIUM      136 - 145 mmol/L 127 (L)    POTASSIUM      3.5 - 5.3 mmol/L 4.3    CHLORIDE      98 - 107 mmol/L 92 (L)    Bicarbonate      21 - 32 mmol/L 26    Anion Gap      10 - 20 mmol/L 13    Blood Urea Nitrogen      6 - 23 mg/dL 10    Creatinine      0.50 - 1.05 mg/dL 0.62    EGFR      >60 mL/min/1.73m*2 >90    Calcium      8.6 - 10.3 mg/dL 9.2    Albumin      3.4 - 5.0 g/dL 4.0    Alkaline Phosphatase      33 - 136 U/L 72    Total Protein      6.4 - 8.2 g/dL 6.5    AST      9 - 39 U/L 21    Bilirubin Total      0.0 - 1.2 mg/dL 1.2    ALT      7 - 45 U/L 10    Color, Urine      Light-Yellow, Yellow,  Dark-Yellow  Yellow    Appearance, Urine      Clear  Turbid ! (N)    Specific Gravity, Urine      1.005 - 1.035  1.014    pH, Urine      5.0, 5.5, 6.0, 6.5, 7.0, 7.5, 8.0  7.5    Protein, Urine      NEGATIVE, 10 (TRACE), 20 (TRACE) mg/dL NEGATIVE    Glucose, Urine      Normal mg/dL Normal    Blood, Urine      NEGATIVE  NEGATIVE    Ketones, Urine      NEGATIVE mg/dL TRACE !    Bilirubin, Urine      NEGATIVE  NEGATIVE    Urobilinogen, Urine      Normal mg/dL Normal    Nitrite, Urine      NEGATIVE  NEGATIVE    Leukocyte Esterase, Urine      NEGATIVE  75 Anirudh/µL !    CHOLESTEROL      0 - 199 mg/dL 239 (H)    HDL CHOLESTEROL      mg/dL 116.1    Cholesterol/HDL Ratio 2.1    LDL Calculated      <=99 mg/dL 113 (H)    VLDL      0 - 40 mg/dL 10    TRIGLYCERIDES      0 - 149 mg/dL 52    Non HDL Cholesterol      0 - 149 mg/dL 123    WBC, Urine      1-5, NONE /HPF 6-10 !    RBC, Urine      NONE, 1-2, 3-5 /HPF 3-5    Squamous Epithelial Cells, Urine      Reference range not established. /HPF 1-9 (SPARSE)    Bacteria, Urine      NONE SEEN /HPF 3+ !    Mucus, Urine      Reference range not established. /LPF FEW    Vitamin D, 25-Hydroxy, Total      30 - 100 ng/mL 70    Thyroid Stimulating Hormone      0.44 - 3.98 mIU/L 1.25       Was not on salt tablet at time of labs    Assessment and Plan:  Problem List Items Addressed This Visit          High    Routine health maintenance - Primary    Overview         Declines Pneumonia and Influenza vaccines      MODERNAL COVID 19 Vaccine 12/31/20, 2/3/21, 12/23/21      DT(PEDIATRIC)8/1/1998       TDAP 8/13/21      Heptavax (discontinued)8/1/1985, 2/1/1985, 1/1/1985; 7/3/20       Influenza Vaccine, Split-Non Spec11/12/2001       Cscope 7/192006 (10yrs); 12/19 (5yrs)      Mamm 10/24/17; 6/20, 7/20/21, 10/30/23      BMD 3/21/13; 10/24/17; 11/6/20, 1/10/23      PAP/HPV 4/5/11 (-);  9/14/18      Hep C Ab (-) 6/26/20 6/21: CT Abd (-) AAA         Current Assessment & Plan     Annual Wellness exam  completed   Preventive Health History reviewed  Ordered:   Labs    Mammogram   BMD   Cscope   Shingles discussed, will get at pharmacy  PAP done (new partner)           Relevant Medications    zoster vaccine-recombinant adjuvanted (Shingrix) 50 mcg/0.5 mL vaccine       Medium    Abnormal screening cardiac CT    Overview     6/20:1. Coronary artery calcium score of 137*. (LAD, LCX)       2. 77th percentile for age, gender in asymptomatic patients.  Coronary Artery Agatston score   at the time  Goal LDL <70  Didnt tolerate statin  On RYR         Advance directive discussed with patient    Overview     9/10/24: Ct Montano (daughter) is her HCPOA  DNR discussed  FULL CODE for now         Alcohol screening    Overview     09/10/24: 5 minutes spent screening for alcohol misuse  See snapshot (social documentation) for details.           Anxiety    Overview     RONNY: 7/12/23 = 17  Uses xanax sparingly  Short term supply suffices for the year         Relevant Medications    ALPRAZolam XR (Xanax XR) 0.5 mg 24 hr tablet    Other Relevant Orders    TSH with reflex to Free T4 if abnormal    Cardiac risk counseling    Overview     9/10/24:  Current 10-Year ASCVD Risk:   10.98% - Intermediate Risk    ASA not rec'd per updated guidelines  On Eliquis and bruises easily also         Disorder of bone density and structure, unspecified    Overview     Clinical OP  H/O fibula fracture/2023  H/O Clavicle fracture 2023  Started Fosamax 2020, stopped that 10/21 due to side effects (bone aching)  BMD 11/6/20: -2.3(radius), -1.5 (femur)   BMD 1/23: Right Forearm T Score: -2.8, -1.4 (femur)  Prolia given 11/12/21- she will self-administer  Continue         Current Assessment & Plan     BMD due 1//25           Encounter for gynecological examination without abnormal finding    Encounter for screening involving social determinants of health (SDoH)    Overview     09/10/24: 5 minutes spent on SDOH screening.  Specifically: Housing,  "Food Insecurity, Utilities and Transportatin Needs were evaluated   (See Screenings in Rooming section for documentation)           Encounter for screening mammogram for breast cancer    Relevant Orders    BI mammo bilateral screening tomosynthesis    Hyponatremia    Overview     Likely due to ACEi  ACEi stopped 10/23 but persisted  Likely SIADH  On salt tablet         Current Assessment & Plan     Nephrology consult if still low sodium after resuming salt tab daily         Relevant Orders    Basic metabolic panel    Arginine Vasopressin Hormone    Osmolality    Osmolality    Hypothyroidism, adult    Overview     TSH > 3.5, symptomatic      Goal TSH 1-2      Onsynthroid         Relevant Orders    Lipid Panel    RESOLVED: Longstanding persistent atrial fibrillation (Multi)    Relevant Orders    Comprehensive Metabolic Panel    CBC and Auto Differential    Urinalysis with Reflex Microscopic    Menopause    Relevant Orders    XR DEXA bone density    Screening for cervical cancer    Relevant Orders    THINPREP PAP TEST    Screening for colon cancer    Relevant Orders    Colonoscopy Screening; Average Risk Patient    Vitamin D insufficiency    Overview     Goal 30-40 (hx kidney stone)         Relevant Orders    Vitamin D 25-Hydroxy,Total (for eval of Vitamin D levels)     Other Visit Diagnoses       Asymptomatic microscopic hematuria        repeat hydrated    Relevant Orders    Urinalysis with Reflex Microscopic    Screening for cardiovascular condition        Relevant Orders    CT cardiac scoring wo IV contrast    Palpable abdominal mass        Needs imaging    Relevant Orders    CT abdomen pelvis w and wo IV contrast            ROS otherwise negative aside from what was mentioned above in HPI.    Vitals  /76   Pulse 81   Temp 35.9 °C (96.6 °F)   Ht 1.626 m (5' 4\")   Wt 58.5 kg (129 lb)   SpO2 97%   BMI 22.14 kg/m²   Body mass index is 22.14 kg/m².  Physical Exam  Gen: Alert, NAD  HEENT:  Unremarkable  Neck: "  No FÉLIX  Respiratory:  Lungs CTAB  Cardiovascular:  Heart RRR  Neuro:  Gross motor and sensory intact  Skin:  No suspicious lesions present  Breast: No masses, or axillary lymphadenopathy  Abd: Palpable mass in lower abdomen just superior to her scar, ~ 5-6cm in size  Gyn: Normal pelvic exam aside from palpable masss as described above, ? Bladder vs colonic vs scar tissue from prior suregry: no uterine masses or cervical lesions, or CMT; no vaginal D/C. No ovarian or adnexal masses; No external vaginal or anal/perineal lesions (Pt declined chaperone)      Patient Care Team:  Kathleen Rice MD as PCP - General  Kathleen Rice MD as PCP - [facility]  KIMMY Sood-CNP as Nurse Practitioner (Cardiology)  Russell Palma MD as Consulting Physician (Cardiology)  Petra Sequeira MD as Cardiologist (Cardiology)  Jet Ferguson MD as Consulting Physician (Orthopaedic Surgery)       Health Risk Assessment:  Patient was asked if he/she has any difficulty performing the following activities of daily living:  Preparing nutritious food and eating? No  Grocery shopping? No  Bathing and grooming yourself? No  Getting dressed? No  Using the toilet?No  Using the phone? No  Moving around from place to place (physical ambulation)? No  Doing housework (including laundry) independently? No  Managing finances independently? No  Managing medications independently? No  Doing housework (including laundry) independently? No    Patient was asked if he/she:  Feels safe in current home environment?: Yes  Uses seatbelt? Yes  Sees the dentist regularly? Yes  Exercises regularly: Yes  Suffers from depression, stress, anger, loneliness or social isolation, pain, suicidality? No    Dietary issues discussed: Yes  Cognitive Impairment No  Hearing difficulties: No  Visual Acuity assessed: No    What is your self-assessment of overall health status and life satisfaction? Good     5 minutes spent on SDOH screening:    Specifically Housing, Food Insecurity, Utilities and Transportatin Needs were evaluated   (See Screenings in Rooming section for documentation)  Food Insecurity: No Food Insecurity (9/10/2024)    Hunger Vital Sign     Worried About Running Out of Food in the Last Year: Never true     Ran Out of Food in the Last Year: Never true     Housing Stability: Unknown (9/10/2024)    Housing Stability Vital Sign     Unable to Pay for Housing in the Last Year: No     Number of Times Moved in the Last Year: Not on file     Homeless in the Last Year: No     Transportation Needs: No Transportation Needs (9/10/2024)    PRAPARE - Transportation     Lack of Transportation (Medical): No     Lack of Transportation (Non-Medical): No     Problem List Items Addressed This Visit          High    Routine health maintenance - Primary    Overview         Declines Pneumonia and Influenza vaccines      MODERNAL COVID 19 Vaccine 12/31/20, 2/3/21, 12/23/21      DT(PEDIATRIC)8/1/1998       TDAP 8/13/21      Heptavax (discontinued)8/1/1985, 2/1/1985, 1/1/1985; 7/3/20       Influenza Vaccine, Split-Non Spec11/12/2001       Cscope 7/192006 (10yrs); 12/19 (5yrs)      Mamm 10/24/17; 6/20, 7/20/21, 10/30/23      BMD 3/21/13; 10/24/17; 11/6/20, 1/10/23      PAP/HPV 4/5/11 (-);  9/14/18      Hep C Ab (-) 6/26/20 6/21: CT Abd (-) AAA         Current Assessment & Plan     Annual Wellness exam completed   Preventive Health History reviewed  Ordered:   Labs    Mammogram   BMD   Cscope   Shingles discussed, will get at pharmacy  PAP done (new partner)           Relevant Medications    zoster vaccine-recombinant adjuvanted (Shingrix) 50 mcg/0.5 mL vaccine       Medium    Abnormal screening cardiac CT    Overview     6/20:1. Coronary artery calcium score of 137*. (LAD, LCX)       2. 77th percentile for age, gender in asymptomatic patients.  Coronary Artery Agatston score   at the time  Goal LDL <70  Didnt tolerate statin  On RYR         Advance  directive discussed with patient    Overview     9/10/24: Ct Montano (daughter) is her HCPOA  DNR discussed  FULL CODE for now         Alcohol screening    Overview     09/10/24: 5 minutes spent screening for alcohol misuse  See snapshot (social documentation) for details.           Anxiety    Overview     RONNY: 7/12/23 = 17  Uses xanax sparingly  Short term supply suffices for the year         Relevant Medications    ALPRAZolam XR (Xanax XR) 0.5 mg 24 hr tablet    Other Relevant Orders    TSH with reflex to Free T4 if abnormal    Cardiac risk counseling    Overview     9/10/24:  Current 10-Year ASCVD Risk:   10.98% - Intermediate Risk    ASA not rec'd per updated guidelines  On Eliquis and bruises easily also         Disorder of bone density and structure, unspecified    Overview     Clinical OP  H/O fibula fracture/2023  H/O Clavicle fracture 2023  Started Fosamax 2020, stopped that 10/21 due to side effects (bone aching)  BMD 11/6/20: -2.3(radius), -1.5 (femur)   BMD 1/23: Right Forearm T Score: -2.8, -1.4 (femur)  Prolia given 11/12/21- she will self-administer  Continue         Current Assessment & Plan     BMD due 1//25           Encounter for gynecological examination without abnormal finding    Encounter for screening involving social determinants of health (SDoH)    Overview     09/10/24: 5 minutes spent on SDOH screening.  Specifically: Housing, Food Insecurity, Utilities and Transportatin Needs were evaluated   (See Screenings in Rooming section for documentation)           Encounter for screening mammogram for breast cancer    Relevant Orders    BI mammo bilateral screening tomosynthesis    Hyponatremia    Overview     Likely due to ACEi  ACEi stopped 10/23 but persisted  Likely SIADH  On salt tablet         Current Assessment & Plan     Nephrology consult if still low sodium after resuming salt tab daily         Relevant Orders    Basic metabolic panel    Arginine Vasopressin Hormone    Osmolality     Osmolality    Hypothyroidism, adult    Overview     TSH > 3.5, symptomatic      Goal TSH 1-2      Onsynthroid         Relevant Orders    Lipid Panel    RESOLVED: Longstanding persistent atrial fibrillation (Multi)    Relevant Orders    Comprehensive Metabolic Panel    CBC and Auto Differential    Urinalysis with Reflex Microscopic    Menopause    Relevant Orders    XR DEXA bone density    Screening for cervical cancer    Relevant Orders    THINPREP PAP TEST    Screening for colon cancer    Relevant Orders    Colonoscopy Screening; Average Risk Patient    Vitamin D insufficiency    Overview     Goal 30-40 (hx kidney stone)         Relevant Orders    Vitamin D 25-Hydroxy,Total (for eval of Vitamin D levels)     Other Visit Diagnoses       Asymptomatic microscopic hematuria        repeat hydrated    Relevant Orders    Urinalysis with Reflex Microscopic    Screening for cardiovascular condition        Relevant Orders    CT cardiac scoring wo IV contrast    Palpable abdominal mass        Needs imaging    Relevant Orders    CT abdomen pelvis w and wo IV contrast           Controlled Substance Visit:  I have personally reviewed the [place] report and have considered the risks of abuse, dependence, addiction and diversion and I believe that it is clinically appropriate for the patient to be prescribed this medication.    Is the patient prescribed a combination of a benzodiazepine and opioid?      Last Urine Drug Screen / ordered today: Yes  No results found for this or any previous visit (from the past 8760 hour(s)).  Results are as expected.     Controlled Substance Agreement:  Date of the Last Agreement: 07/12/2023  I have reviewed each line item on the Controlled Substance Agreement including, but not limited to, the benefits, risks, and alternatives to treatment with a Controlled Substance medication(s). The patient has verbalized understanding and signed the agreement.    Benzodiazepines:  What is the patient's goal  of therapy? For flying  Is this being achieved with current treatment? yes    RONNY-7:  Over the last 2 weeks, how often have you been bothered by any of the following problems?  Feeling nervous, anxious, or on edge: 1  Not being able to stop or control worryin  Worrying too much about different things: 0  Trouble relaxin  Being so restless that it is hard to sit still: 0  Becoming easily annoyed or irritable: 0  Feeling afraid as if something awful might happen: 0  RONNY-7 Total Score: 1        Activities of Daily Living:   Is your overall impression that this patient is benefiting (symptom reduction outweighs side effects) from benzodiazepine therapy? Yes     1. Physical Functioning: Better  2. Family Relationship: Better  3. Social Relationship: Better  4. Mood: Better  5. Sleep Patterns: Better  6. Overall Function: Better            Allergies and Medications  Demerol [meperidine], Sutures, Alendronate, Cysteine, Lisinopril, Nifedipine, Rivaroxaban, and Rosuvastatin  Current Outpatient Medications   Medication Instructions    ALPRAZolam XR (XANAX XR) 0.5 mg, oral, As needed, Do not crush, chew, or split.    calcium carbonate 215 mg calcium (500 mg) tablet,chewable 1 tablet, oral, Daily PRN    cholecalciferol (Vitamin D-3) 5,000 Units tablet 1 tablet, oral, Daily, 1 tablet daily    Eliquis 5 mg, oral, 2 times daily    ferrous sulfate (325 mg ferrous sulfate) 325 mg, oral, 2 times weekly    flecainide (TAMBOCOR) 100 mg, oral, 2 times daily    levothyroxine (SYNTHROID, LEVOXYL) 75 mcg, oral, Daily before breakfast    multivitamin tablet 1 tablet, oral, Daily    omega 3-dha-epa-fish oil (Fish OiL) 1,000 mg (120 mg-180 mg) capsule 1 capsule, oral, Daily    Prolia 60 mg, subcutaneous, Every 6 months    RED BEET ROOT-SOUR CHERRY EXT ORAL 1 tablet, oral, Daily    red yeast rice 600 mg capsule 1 capsule, oral, Daily    sodium chloride 1 g, oral, Daily    vitamins A,C,E-zinc-copper (PreserVision AREDS) 2,148 mcg-113  mg-45 mg-17.4mg tablet 1 tablet, oral, Daily    zoster vaccine-recombinant adjuvanted (Shingrix) 50 mcg/0.5 mL vaccine 0.5 mL, intramuscular, Once       Medications and Supplements  prescribed by me and other practitioners or clinical pharmacist (such as prescriptions, OTC's, herbal therapies and supplements) were reviewed and documented in the medical record.      Active Problem List  Patient Active Problem List   Diagnosis    Anxiety    Atrial fibrillation (Multi)    BPPV (benign paroxysmal positional vertigo)    Chronic insomnia    Disorder of bone density and structure, unspecified    Diverticulosis of colon    History of colon polyps    Hyperlipidemia, mixed    H/O: hypertension    Hyponatremia    Hypothyroidism, adult    Lumbar spondylosis    Other specified disorders of temporomandibular joint    PAC (premature atrial contraction)    Primary Raynaud's phenomenon    Scoliosis    Spinal stenosis of lumbar region    Vitamin D insufficiency    Advance directive discussed with patient    Cardiac risk counseling    H/O gastritis    History of kidney stones    Routine health maintenance    Abnormal screening cardiac CT    Screen for colon cancer    Encounter for screening mammogram for breast cancer    Screening for multiple conditions [Z13.89]    Syncope and collapse    History of ankle fracture    Arthritis of wrist    Raynaud's disease    Alcohol screening    Encounter for screening involving social determinants of health (SDoH)    Encounter for gynecological examination without abnormal finding    Screening for cervical cancer    Screening for colon cancer    Menopause       Comprehensive Medical/Surgical/Social/Family History  Past Medical History:   Diagnosis Date    A-fib (Multi)     s/p 1/29/24 Cardioversion    Abnormal screening cardiac CT     6/20:1. Coronary artery calcium score of 137*. (LAD, LCX)     2. 77th percentile** for age, gender in asymptomatic patients.     *Coronary Artery  Agatston score     Anticoagulated     Eliquis    Anxiety     Arrhythmia     Cardiology follow-up encounter     Russell Palma MD- has APT 5/8/2024    Closed fracture of sternal end of clavicle 11/04/2022    Dislocation of acromioclavicular joint 11/04/2022    H/O echocardiogram     6/21: CONCLUSIONS:     1. The left ventricular systolic function is normal with a 60-65% estimated ejection     fraction.     2. Spectral Doppler shows an impaired relaxation pattern of left ventricular diastolic filling     Mild MR/TR    History of Holter monitoring     1/19: Underlying AFIB with RVR     5/21: NSR with occ isolated PVCs (when had sx)    History of nuclear stress test     9/12: normal    Hypertension     Hypothyroid     Palpitations 12/26/2018    Poor balance of body weight 02/29/2024    Raynaud's disease     Shortness of breath at rest 02/29/2024    Shoulder pain 02/29/2024    Syncope and collapse     Traumatic arthritis of foot, unspecified laterality      Past Surgical History:   Procedure Laterality Date    ANKLE SURGERY  07/14/2023    1. LEFT ANKLE ARTHROTOMY WITH SYNEVECTOMY/ LET PARTIAL TIBIA EXCISION/ LEFT SUBTALAR JOINT INJECTION WITH C-ARM    BACK SURGERY      L2-S1 discectomy and fusio    BELT ABDOMINOPLASTY      Abdominoplasty    BUNIONECTOMY      Bunionectomy    CARDIAC CATHETERIZATION  02/2022    Cardiac catheterization    CARDIOVERSION  02/2022    Cardioversion    CATARACT EXTRACTION      Cataract surgery    COLONOSCOPY  12/2019 12/19: diverticulosis, 2mm polyp (TA)     7/19/06: Impression:Hyperplastic polyp sigmoid-removed.  Normal rectum. Normal      cecum. Normal ascending colon and ileocecal valve. A single diminutive      polyp found in the sigmoid; removed by cold biopsy polypectomy.    ESOPHAGOGASTRODUODENOSCOPY  10/08/2021    10/8/21: - Normal examined duodenum. - Erythematous mucosa in the antrum. Biopsied.- 3 cm hiatal hernia.    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  (See Rooming Screening section for documentation, and/or progress note for additional information)    Cardiac Risk Assessment (15 minutes spent on this)  The ASCVD Risk score (Rashaad RIOS, et al., 2019) failed to calculate for the following reasons:    The valid HDL cholesterol range is 20 to 100 mg/dL    Cardiovascular risk was discussed and, if needed, lifestyle modifications recommended, including nutritional choices, exercise, and elimination of habits contributing to risk. We agreed on a plan to reduce the current cardiovascular risk based on above discussion as needed.     Aspirin use/disuse was discussed and documented in the Problem List of the medical record (under Cardiac Risk Counseling) after reviewing the updated guidelines below:  Consider low dose Aspirin ( mg) use if the benefit for cardiovascular disease prevention outweighs risk for bleeding complications.   Discussed that in general, low dose ASA should be considered:  In patients WITHOUT prior MI/stroke/PAD (primary prevention):   a. Age <60: Use if 10-year cardiovascular disease  risk >20%, with discussion of risks and benefits with patient  b. Age 60-<70: Use if 10-year cardiovascular disease risk >20% and low bleeding (e.g., gastrointestinal) risk, with discussion of risks and benefits with patient  c. Age >=70: Do not use    In patients WITH prior MI/stroke/PAD (secondary prevention):   Generally use unless extremely high bleeding (e.g., gastrointenstinal) risk, with discussion of risks and benefits with patient    Advance Directives Discussion  Advanced Care Planning (including a Living Will, Healthcare POA, as well as specific end of life choices and/or directives), was discussed with the patient and/or surrogate, voluntarily, and details of that discussion documented in the Problem List (under Advanced Directives Discussion) of the medical record.   (16 min spent discussing above)     During the course of the visit the patient was educated and counseled about age appropriate screening and preventive services.   Completed preventive screenings were documented in the chart (see Routine Health Maintenance in Problem List) and orders were placed for outstanding screenings/procedures as documented in the Assessment and Plan.    Patient Instructions (the written plan) was given to the patient at check out as part of the AVS.

## 2024-09-11 DIAGNOSIS — R31.21 ASYMPTOMATIC MICROSCOPIC HEMATURIA: ICD-10-CM

## 2024-09-12 ENCOUNTER — OFFICE VISIT (OUTPATIENT)
Dept: WOUND CARE | Facility: CLINIC | Age: 69
End: 2024-09-12
Payer: MEDICARE

## 2024-09-12 PROCEDURE — 1123F ACP DISCUSS/DSCN MKR DOCD: CPT | Performed by: PLASTIC SURGERY

## 2024-09-12 PROCEDURE — 1157F ADVNC CARE PLAN IN RCRD: CPT | Performed by: PLASTIC SURGERY

## 2024-09-12 PROCEDURE — 15271 SKIN SUB GRAFT TRNK/ARM/LEG: CPT | Performed by: PLASTIC SURGERY

## 2024-09-17 ENCOUNTER — OFFICE VISIT (OUTPATIENT)
Dept: WOUND CARE | Facility: CLINIC | Age: 69
End: 2024-09-17
Payer: MEDICARE

## 2024-09-17 ENCOUNTER — HOSPITAL ENCOUNTER (OUTPATIENT)
Dept: RADIOLOGY | Facility: HOSPITAL | Age: 69
Discharge: HOME | End: 2024-09-17
Payer: MEDICARE

## 2024-09-17 DIAGNOSIS — R19.00 PALPABLE ABDOMINAL MASS: ICD-10-CM

## 2024-09-17 PROCEDURE — 15271 SKIN SUB GRAFT TRNK/ARM/LEG: CPT | Performed by: SURGERY

## 2024-09-17 PROCEDURE — 74177 CT ABD & PELVIS W/CONTRAST: CPT | Performed by: RADIOLOGY

## 2024-09-17 PROCEDURE — 2550000001 HC RX 255 CONTRASTS: Performed by: INTERNAL MEDICINE

## 2024-09-17 PROCEDURE — 74177 CT ABD & PELVIS W/CONTRAST: CPT

## 2024-09-20 ENCOUNTER — TELEPHONE (OUTPATIENT)
Dept: GASTROENTEROLOGY | Facility: EXTERNAL LOCATION | Age: 69
End: 2024-09-20
Payer: MEDICARE

## 2024-09-20 PROBLEM — R19.00 PALPABLE ABDOMINAL MASS: Status: ACTIVE | Noted: 2024-09-20

## 2024-09-20 LAB
CYTOLOGY CMNT CVX/VAG CYTO-IMP: NORMAL
HPV HR 12 DNA GENITAL QL NAA+PROBE: NEGATIVE
HPV HR GENOTYPES PNL CVX NAA+PROBE: NEGATIVE
HPV16 DNA SPEC QL NAA+PROBE: NEGATIVE
HPV18 DNA SPEC QL NAA+PROBE: NEGATIVE
LAB AP HPV GENOTYPE QUESTION: YES
LAB AP HPV HR: NORMAL
LABORATORY COMMENT REPORT: NORMAL
PATH REPORT.TOTAL CANCER: NORMAL

## 2024-09-26 ENCOUNTER — HOSPITAL ENCOUNTER (OUTPATIENT)
Dept: RADIOLOGY | Facility: HOSPITAL | Age: 69
Discharge: HOME | End: 2024-09-26
Payer: MEDICARE

## 2024-09-26 ENCOUNTER — OFFICE VISIT (OUTPATIENT)
Dept: WOUND CARE | Facility: CLINIC | Age: 69
End: 2024-09-26
Payer: MEDICARE

## 2024-09-26 ENCOUNTER — LAB (OUTPATIENT)
Dept: LAB | Facility: LAB | Age: 69
End: 2024-09-26
Payer: MEDICARE

## 2024-09-26 DIAGNOSIS — E87.1 HYPONATREMIA: ICD-10-CM

## 2024-09-26 DIAGNOSIS — R19.00 PALPABLE ABDOMINAL MASS: ICD-10-CM

## 2024-09-26 DIAGNOSIS — R31.21 ASYMPTOMATIC MICROSCOPIC HEMATURIA: ICD-10-CM

## 2024-09-26 LAB
ANION GAP SERPL CALC-SCNC: 13 MMOL/L (ref 10–20)
APPEARANCE UR: ABNORMAL
BACTERIA #/AREA URNS AUTO: ABNORMAL /HPF
BILIRUB UR STRIP.AUTO-MCNC: NEGATIVE MG/DL
BUN SERPL-MCNC: 6 MG/DL (ref 6–23)
CALCIUM SERPL-MCNC: 9.1 MG/DL (ref 8.6–10.3)
CHLORIDE SERPL-SCNC: 96 MMOL/L (ref 98–107)
CO2 SERPL-SCNC: 26 MMOL/L (ref 21–32)
COLOR UR: YELLOW
CREAT SERPL-MCNC: 0.6 MG/DL (ref 0.5–1.05)
EGFRCR SERPLBLD CKD-EPI 2021: >90 ML/MIN/1.73M*2
GLUCOSE SERPL-MCNC: 82 MG/DL (ref 74–99)
GLUCOSE UR STRIP.AUTO-MCNC: NORMAL MG/DL
KETONES UR STRIP.AUTO-MCNC: NEGATIVE MG/DL
LEUKOCYTE ESTERASE UR QL STRIP.AUTO: ABNORMAL
MUCOUS THREADS #/AREA URNS AUTO: ABNORMAL /LPF
NITRITE UR QL STRIP.AUTO: NEGATIVE
OSMOLALITY SERPL: 272 MOSM/KG (ref 280–300)
PH UR STRIP.AUTO: 7.5 [PH]
POTASSIUM SERPL-SCNC: 4.5 MMOL/L (ref 3.5–5.3)
PROT UR STRIP.AUTO-MCNC: NEGATIVE MG/DL
RBC # UR STRIP.AUTO: NEGATIVE /UL
RBC #/AREA URNS AUTO: ABNORMAL /HPF
SODIUM SERPL-SCNC: 130 MMOL/L (ref 136–145)
SP GR UR STRIP.AUTO: 1.01
SQUAMOUS #/AREA URNS AUTO: ABNORMAL /HPF
UROBILINOGEN UR STRIP.AUTO-MCNC: NORMAL MG/DL
WBC #/AREA URNS AUTO: ABNORMAL /HPF

## 2024-09-26 PROCEDURE — 76882 US LMTD JT/FCL EVL NVASC XTR: CPT

## 2024-09-26 PROCEDURE — 84588 ASSAY OF VASOPRESSIN: CPT

## 2024-09-26 PROCEDURE — 80048 BASIC METABOLIC PNL TOTAL CA: CPT

## 2024-09-26 PROCEDURE — 81001 URINALYSIS AUTO W/SCOPE: CPT

## 2024-09-26 PROCEDURE — 11042 DBRDMT SUBQ TIS 1ST 20SQCM/<: CPT

## 2024-09-26 PROCEDURE — 36415 COLL VENOUS BLD VENIPUNCTURE: CPT

## 2024-09-26 PROCEDURE — 11042 DBRDMT SUBQ TIS 1ST 20SQCM/<: CPT | Performed by: PLASTIC SURGERY

## 2024-09-26 PROCEDURE — 83930 ASSAY OF BLOOD OSMOLALITY: CPT

## 2024-09-29 DIAGNOSIS — R19.00 PALPABLE ABDOMINAL MASS: Primary | ICD-10-CM

## 2024-09-30 DIAGNOSIS — F41.9 ANXIETY: ICD-10-CM

## 2024-09-30 RX ORDER — ALPRAZOLAM 0.5 MG/1
0.5 TABLET, EXTENDED RELEASE ORAL AS NEEDED
Qty: 21 TABLET | Refills: 0 | Status: SHIPPED | OUTPATIENT
Start: 2024-09-30

## 2024-10-02 ENCOUNTER — APPOINTMENT (OUTPATIENT)
Dept: SURGERY | Facility: CLINIC | Age: 69
End: 2024-10-02
Payer: MEDICARE

## 2024-10-02 VITALS
SYSTOLIC BLOOD PRESSURE: 112 MMHG | DIASTOLIC BLOOD PRESSURE: 68 MMHG | BODY MASS INDEX: 21.83 KG/M2 | HEART RATE: 84 BPM | WEIGHT: 131 LBS | HEIGHT: 65 IN

## 2024-10-02 DIAGNOSIS — R19.00 PALPABLE ABDOMINAL MASS: ICD-10-CM

## 2024-10-02 LAB — VASOPRESSIN SERPL-MCNC: <0.5 PG/ML (ref 0–6.9)

## 2024-10-02 PROCEDURE — 99213 OFFICE O/P EST LOW 20 MIN: CPT | Performed by: SURGERY

## 2024-10-02 PROCEDURE — 1123F ACP DISCUSS/DSCN MKR DOCD: CPT | Performed by: SURGERY

## 2024-10-02 PROCEDURE — 1036F TOBACCO NON-USER: CPT | Performed by: SURGERY

## 2024-10-02 PROCEDURE — 1157F ADVNC CARE PLAN IN RCRD: CPT | Performed by: SURGERY

## 2024-10-02 PROCEDURE — 1159F MED LIST DOCD IN RCRD: CPT | Performed by: SURGERY

## 2024-10-02 PROCEDURE — 3008F BODY MASS INDEX DOCD: CPT | Performed by: SURGERY

## 2024-10-02 NOTE — PROGRESS NOTES
"Subjective   Patient ID: Delaney Ch \"Lee\" is a 69 y.o. female who presents for New Patient Visit (Hematoma in abdomen ).  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  She is complaining of an area of the abdominoplasty was performed proximately 20 years ago.  Review of Systems   All other systems reviewed and are negative.      Objective   Physical Exam  Abdominal:      Comments: Abdomen soft nontender she has a abdominal wall mass that is nontender and nonfluctuant no hernia       Physical Exam  Constitutional:       Appearance: Normal appearance.   HENT:      Head: Normocephalic and atraumatic.      Mouth/Throat:      Pharynx: Oropharynx is clear.   Eyes:      Pupils: Pupils are equal, round, and reactive to light.   Cardiovascular:      Rate and Rhythm: Normal rate and regular rhythm.   Pulmonary:      Effort: Pulmonary effort is normal.      Breath sounds: Normal breath sounds.   Abdominal:      General: Abdomen is flat. Bowel sounds are normal.      Palpations: Abdomen is soft.   Musculoskeletal:         General: Normal range of motion.      Cervical back: Normal range of motion.   Skin:     General: Skin is warm.   Neurological:      General: No focal deficit present.      Mental Status: She is alert. Mental status is at baseline.   Psychiatric:         Mood and Affect: Mood normal.     Assessment/Plan   Imaging consistent with hematoma no surgical indications discussed with patient she is agreeable regarding the postoperative appearance of the abdominoplasty will refer back to the visual plastic surgeon follow-up as needed         Woody Carcamo MD 10/02/24 2:52 PM   "

## 2024-10-03 ENCOUNTER — OFFICE VISIT (OUTPATIENT)
Dept: WOUND CARE | Facility: CLINIC | Age: 69
End: 2024-10-03
Payer: MEDICARE

## 2024-10-03 ENCOUNTER — LAB REQUISITION (OUTPATIENT)
Dept: LAB | Facility: HOSPITAL | Age: 69
End: 2024-10-03
Payer: MEDICARE

## 2024-10-03 DIAGNOSIS — T81.31XA DISRUPTION OF EXTERNAL OPERATION (SURGICAL) WOUND, NOT ELSEWHERE CLASSIFIED, INITIAL ENCOUNTER: ICD-10-CM

## 2024-10-03 DIAGNOSIS — I73.00 RAYNAUD'S SYNDROME WITHOUT GANGRENE: ICD-10-CM

## 2024-10-03 DIAGNOSIS — I87.2 VENOUS INSUFFICIENCY (CHRONIC) (PERIPHERAL): ICD-10-CM

## 2024-10-03 DIAGNOSIS — L98.492 NON-PRESSURE CHRONIC ULCER OF SKIN OF OTHER SITES WITH FAT LAYER EXPOSED: ICD-10-CM

## 2024-10-03 DIAGNOSIS — L97.823 NON-PRESSURE CHRONIC ULCER OF OTHER PART OF LEFT LOWER LEG WITH NECROSIS OF MUSCLE: ICD-10-CM

## 2024-10-03 PROCEDURE — 11043 DBRDMT MUSC&/FSCA 1ST 20/<: CPT

## 2024-10-03 PROCEDURE — 87077 CULTURE AEROBIC IDENTIFY: CPT | Mod: OUT,ELYLAB | Performed by: PLASTIC SURGERY

## 2024-10-03 PROCEDURE — 11043 DBRDMT MUSC&/FSCA 1ST 20/<: CPT | Performed by: PLASTIC SURGERY

## 2024-10-06 LAB
BACTERIA SPEC CULT: ABNORMAL
BACTERIA SPEC CULT: ABNORMAL
GRAM STN SPEC: ABNORMAL
GRAM STN SPEC: ABNORMAL

## 2024-10-10 ENCOUNTER — OFFICE VISIT (OUTPATIENT)
Dept: WOUND CARE | Facility: CLINIC | Age: 69
End: 2024-10-10
Payer: MEDICARE

## 2024-10-10 PROCEDURE — 11043 DBRDMT MUSC&/FSCA 1ST 20/<: CPT | Performed by: PLASTIC SURGERY

## 2024-10-10 PROCEDURE — 11043 DBRDMT MUSC&/FSCA 1ST 20/<: CPT

## 2024-10-14 ENCOUNTER — OFFICE VISIT (OUTPATIENT)
Dept: WOUND CARE | Facility: CLINIC | Age: 69
End: 2024-10-14
Payer: MEDICARE

## 2024-10-14 PROCEDURE — 11043 DBRDMT MUSC&/FSCA 1ST 20/<: CPT

## 2024-10-17 ENCOUNTER — APPOINTMENT (OUTPATIENT)
Dept: WOUND CARE | Facility: CLINIC | Age: 69
End: 2024-10-17
Payer: MEDICARE

## 2024-10-24 ENCOUNTER — OFFICE VISIT (OUTPATIENT)
Dept: WOUND CARE | Facility: CLINIC | Age: 69
End: 2024-10-24
Payer: MEDICARE

## 2024-10-24 PROCEDURE — 11043 DBRDMT MUSC&/FSCA 1ST 20/<: CPT

## 2024-10-24 PROCEDURE — 1123F ACP DISCUSS/DSCN MKR DOCD: CPT | Performed by: PLASTIC SURGERY

## 2024-10-24 PROCEDURE — 11043 DBRDMT MUSC&/FSCA 1ST 20/<: CPT | Performed by: PLASTIC SURGERY

## 2024-10-24 PROCEDURE — 1157F ADVNC CARE PLAN IN RCRD: CPT | Performed by: PLASTIC SURGERY

## 2024-10-30 ENCOUNTER — HOSPITAL ENCOUNTER (OUTPATIENT)
Dept: RADIOLOGY | Facility: HOSPITAL | Age: 69
Discharge: HOME | End: 2024-10-30
Payer: MEDICARE

## 2024-10-30 DIAGNOSIS — Z12.31 ENCOUNTER FOR SCREENING MAMMOGRAM FOR BREAST CANCER: ICD-10-CM

## 2024-10-30 PROCEDURE — 77063 BREAST TOMOSYNTHESIS BI: CPT

## 2024-10-30 PROCEDURE — 77063 BREAST TOMOSYNTHESIS BI: CPT | Performed by: RADIOLOGY

## 2024-10-30 PROCEDURE — 77067 SCR MAMMO BI INCL CAD: CPT | Performed by: RADIOLOGY

## 2024-10-31 ENCOUNTER — OFFICE VISIT (OUTPATIENT)
Dept: WOUND CARE | Facility: CLINIC | Age: 69
End: 2024-10-31
Payer: MEDICARE

## 2024-10-31 PROCEDURE — 11043 DBRDMT MUSC&/FSCA 1ST 20/<: CPT | Performed by: PLASTIC SURGERY

## 2024-10-31 PROCEDURE — 11043 DBRDMT MUSC&/FSCA 1ST 20/<: CPT

## 2024-11-07 ENCOUNTER — OFFICE VISIT (OUTPATIENT)
Dept: WOUND CARE | Facility: CLINIC | Age: 69
End: 2024-11-07
Payer: MEDICARE

## 2024-11-07 PROCEDURE — 87077 CULTURE AEROBIC IDENTIFY: CPT | Mod: OUT | Performed by: PLASTIC SURGERY

## 2024-11-07 PROCEDURE — 11043 DBRDMT MUSC&/FSCA 1ST 20/<: CPT | Performed by: PLASTIC SURGERY

## 2024-11-07 PROCEDURE — 11043 DBRDMT MUSC&/FSCA 1ST 20/<: CPT

## 2024-11-07 PROCEDURE — 87077 CULTURE AEROBIC IDENTIFY: CPT | Mod: OUT

## 2024-11-08 ENCOUNTER — LAB REQUISITION (OUTPATIENT)
Dept: LAB | Facility: HOSPITAL | Age: 69
End: 2024-11-08
Payer: MEDICARE

## 2024-11-08 DIAGNOSIS — T81.31XA DISRUPTION OF EXTERNAL OPERATION (SURGICAL) WOUND, NOT ELSEWHERE CLASSIFIED, INITIAL ENCOUNTER: ICD-10-CM

## 2024-11-08 DIAGNOSIS — L98.492 NON-PRESSURE CHRONIC ULCER OF SKIN OF OTHER SITES WITH FAT LAYER EXPOSED: ICD-10-CM

## 2024-11-08 DIAGNOSIS — I73.00 RAYNAUD'S SYNDROME WITHOUT GANGRENE: ICD-10-CM

## 2024-11-08 DIAGNOSIS — L97.823 NON-PRESSURE CHRONIC ULCER OF OTHER PART OF LEFT LOWER LEG WITH NECROSIS OF MUSCLE: ICD-10-CM

## 2024-11-08 DIAGNOSIS — I87.2 VENOUS INSUFFICIENCY (CHRONIC) (PERIPHERAL): ICD-10-CM

## 2024-11-10 LAB
BACTERIA SPEC CULT: ABNORMAL
GRAM STN SPEC: ABNORMAL
GRAM STN SPEC: ABNORMAL

## 2024-11-14 ENCOUNTER — OFFICE VISIT (OUTPATIENT)
Dept: WOUND CARE | Facility: CLINIC | Age: 69
End: 2024-11-14
Payer: MEDICARE

## 2024-11-14 PROCEDURE — 11042 DBRDMT SUBQ TIS 1ST 20SQCM/<: CPT

## 2024-11-14 PROCEDURE — 11042 DBRDMT SUBQ TIS 1ST 20SQCM/<: CPT | Performed by: PLASTIC SURGERY

## 2024-11-19 ENCOUNTER — OFFICE VISIT (OUTPATIENT)
Dept: WOUND CARE | Facility: CLINIC | Age: 69
End: 2024-11-19
Payer: MEDICARE

## 2024-11-19 PROCEDURE — 11042 DBRDMT SUBQ TIS 1ST 20SQCM/<: CPT

## 2024-11-19 PROCEDURE — 11042 DBRDMT SUBQ TIS 1ST 20SQCM/<: CPT | Performed by: SURGERY

## 2024-11-25 ENCOUNTER — OFFICE VISIT (OUTPATIENT)
Dept: WOUND CARE | Facility: CLINIC | Age: 69
End: 2024-11-25
Payer: MEDICARE

## 2024-11-25 PROCEDURE — 11043 DBRDMT MUSC&/FSCA 1ST 20/<: CPT

## 2024-11-25 PROCEDURE — 11043 DBRDMT MUSC&/FSCA 1ST 20/<: CPT | Performed by: PLASTIC SURGERY

## 2024-12-02 ENCOUNTER — OFFICE VISIT (OUTPATIENT)
Dept: WOUND CARE | Facility: CLINIC | Age: 69
End: 2024-12-02
Payer: MEDICARE

## 2024-12-02 PROCEDURE — 29581 APPL MULTLAYER CMPRN SYS LEG: CPT | Mod: LT

## 2024-12-02 PROCEDURE — 99212 OFFICE O/P EST SF 10 MIN: CPT | Performed by: PLASTIC SURGERY

## 2024-12-05 ENCOUNTER — HOSPITAL ENCOUNTER (OUTPATIENT)
Dept: RADIOLOGY | Facility: HOSPITAL | Age: 69
Discharge: HOME | End: 2024-12-05
Payer: MEDICARE

## 2024-12-05 DIAGNOSIS — R19.00 PALPABLE ABDOMINAL MASS: ICD-10-CM

## 2024-12-05 PROCEDURE — 76882 US LMTD JT/FCL EVL NVASC XTR: CPT | Performed by: STUDENT IN AN ORGANIZED HEALTH CARE EDUCATION/TRAINING PROGRAM

## 2024-12-05 PROCEDURE — 76882 US LMTD JT/FCL EVL NVASC XTR: CPT

## 2024-12-09 ENCOUNTER — OFFICE VISIT (OUTPATIENT)
Dept: WOUND CARE | Facility: CLINIC | Age: 69
End: 2024-12-09
Payer: MEDICARE

## 2024-12-09 PROCEDURE — 11043 DBRDMT MUSC&/FSCA 1ST 20/<: CPT | Performed by: PLASTIC SURGERY

## 2024-12-09 PROCEDURE — 11043 DBRDMT MUSC&/FSCA 1ST 20/<: CPT

## 2024-12-12 ENCOUNTER — APPOINTMENT (OUTPATIENT)
Dept: UROLOGY | Facility: CLINIC | Age: 69
End: 2024-12-12
Payer: MEDICARE

## 2024-12-12 DIAGNOSIS — R31.29 OTHER MICROSCOPIC HEMATURIA: Primary | ICD-10-CM

## 2024-12-12 DIAGNOSIS — R39.15 URGENCY OF URINATION: ICD-10-CM

## 2024-12-12 PROCEDURE — 1157F ADVNC CARE PLAN IN RCRD: CPT | Performed by: NURSE PRACTITIONER

## 2024-12-12 PROCEDURE — 99443 PR PHYS/QHP TELEPHONE EVALUATION 21-30 MIN: CPT | Performed by: NURSE PRACTITIONER

## 2024-12-12 PROCEDURE — 1123F ACP DISCUSS/DSCN MKR DOCD: CPT | Performed by: NURSE PRACTITIONER

## 2024-12-12 NOTE — PROGRESS NOTES
"12/12/24   [id number]    Chief Complaint: microscopic hematuria    Virtual or Telephone Consent    A telephone visit (audio only) between the patient (at the originating site) and the provider (at the distant site) was utilized to provide this telehealth service.   Verbal consent was requested and obtained from Delaney Ch on this date, 12/12/24 for a telehealth visit.        Subjective      HPI Delaney Ch \"Lee\" is a 69 y.o. female who presents for microscopic hematuria; Kindly referred by ;     9/6, 9/10, and 9/26/24 rbc 3-5/hpf past few mos;    9/1/23 rbc 2/hpf    No gross hematuria, one kidney stone many years ago, passed on her own;     Some urinary urgency, no frequency or leakage; does kegels, wears pad for security; doesn't have to change pad; no sensation of prolapse;     Still with uterus and ovaries; abdominoplasty 20 yrs ago; vaginal bleeding 4 mos ago, saw GYN TVUS done and no concerns;     Never smoked  No family hx of urological cancers  No family hx Nieves syndrome  Works as dentist, chemicals (all bonding agents)  No unintentional weight loss;     Recently mom who lived with her passed away 6 mos ago, she has lost two husbands passed away, two boyfriends have also passed away;     PMH Afib on eliquis, flecainide; thyroid; osteopenia; hematoma under abdominoplasty; cholesterol; claustrophobic;   PSH  ankle, back, abdominoplasty, bunionectomy, cardiac cath, hip surgery, bilateral knees, tibia, wrist;   FH no family hx urological cancers  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   Physical Exam  Deferred for phone visit    Assessment/Plan   Problem List Items Addressed This Visit    None  Visit Diagnoses       Other microscopic hematuria    -  Primary    Relevant Orders    CT urography w 3D volume rendered imaging    Creatinine, Serum    Urinalysis with Reflex Culture and Microscopic    Urgency of urination   "            Orders Placed This Encounter   Procedures    CT urography w 3D volume rendered imaging     Standing Status:   Future     Standing Expiration Date:   12/12/2025     Order Specific Question:   PHE Acuity     Answer:   urgent     Order Specific Question:   Reason for exam:     Answer:   microscopic hematuria     Order Specific Question:   Radiologist to Determine Optimal Study     Answer:   Yes     Order Specific Question:   Release result to MyChart     Answer:   Immediate    Creatinine, Serum     Standing Status:   Future     Standing Expiration Date:   12/12/2025     Order Specific Question:   Release result to MyChart     Answer:   Immediate [1]    Urinalysis with Reflex Culture and Microscopic     2 weeks prior to cystoscopy     Standing Status:   Future     Standing Expiration Date:   12/12/2025     Order Specific Question:   Release result to MyChart     Answer:   Immediate [1]      High Risk  Cystoscopy and CT Urogram  If contraindications to CT, then MR Urography  if contraindications to CT and MR Urography, then retrograde pyelography in conjunction with non-contrast axial imaging or renal ultrasound         Will call to schedule CT Urogram,   Blood work one week prior to imaging    Cystoscopy [place] office, needs Thursdays  Urine culture two weeks prior    Follow up Ban pelvic exam, OAB Thursday [facility]    Nurse line [phone]  Ban Nidaye, APRN-CNP  Lab Results   Component Value Date    GLUCOSE 82 09/26/2024    CALCIUM 9.1 09/26/2024     (L) 09/26/2024    K 4.5 09/26/2024    CO2 26 09/26/2024    CL 96 (L) 09/26/2024    BUN 6 09/26/2024    CREATININE 0.60 09/26/2024

## 2024-12-13 ENCOUNTER — TELEPHONE (OUTPATIENT)
Dept: UROLOGY | Facility: CLINIC | Age: 69
End: 2024-12-13
Payer: MEDICARE

## 2024-12-13 NOTE — TELEPHONE ENCOUNTER
Pt left message thanking you for the appt yesterday and the conversation and said she will call to schedule her fu after she schedules the cysto. She states she has one more question. She states that she forgot to mention she is allergic to adhesive tape and maybe that is causing irritation when she self pleasures? Please advise, thanks.

## 2024-12-18 ENCOUNTER — TELEPHONE (OUTPATIENT)
Dept: PRIMARY CARE | Facility: CLINIC | Age: 69
End: 2024-12-18
Payer: MEDICARE

## 2024-12-18 NOTE — TELEPHONE ENCOUNTER
Pt called states she is leaving for [place] on Friday for [place] she gets altitude sickness (nausea fatigue achy) asking if we can send in Acetazolamide she has used this before she has some left but they are old and she does not want to use them do we need a vv for this I looked and did not see it anywhere in her med list  Please advise

## 2024-12-18 NOTE — TELEPHONE ENCOUNTER
Can you schedule pt   Needs to be seen by either NP can be vv per  policy  to prescribe medication

## 2024-12-19 ENCOUNTER — OFFICE VISIT (OUTPATIENT)
Dept: WOUND CARE | Facility: CLINIC | Age: 69
End: 2024-12-19
Payer: MEDICARE

## 2024-12-19 ENCOUNTER — TELEMEDICINE (OUTPATIENT)
Dept: PRIMARY CARE | Facility: CLINIC | Age: 69
End: 2024-12-19
Payer: MEDICARE

## 2024-12-19 DIAGNOSIS — F41.9 ANXIETY: ICD-10-CM

## 2024-12-19 DIAGNOSIS — Z79.899 MEDICATION MANAGEMENT: ICD-10-CM

## 2024-12-19 DIAGNOSIS — Z29.89 ALTITUDE SICKNESS PREVENTATIVE MEASURES: Primary | ICD-10-CM

## 2024-12-19 PROCEDURE — 99212 OFFICE O/P EST SF 10 MIN: CPT

## 2024-12-19 PROCEDURE — G2211 COMPLEX E/M VISIT ADD ON: HCPCS | Performed by: NURSE PRACTITIONER

## 2024-12-19 PROCEDURE — 1036F TOBACCO NON-USER: CPT | Performed by: NURSE PRACTITIONER

## 2024-12-19 PROCEDURE — 1123F ACP DISCUSS/DSCN MKR DOCD: CPT | Performed by: NURSE PRACTITIONER

## 2024-12-19 PROCEDURE — 1159F MED LIST DOCD IN RCRD: CPT | Performed by: NURSE PRACTITIONER

## 2024-12-19 PROCEDURE — 99214 OFFICE O/P EST MOD 30 MIN: CPT | Performed by: NURSE PRACTITIONER

## 2024-12-19 PROCEDURE — 1160F RVW MEDS BY RX/DR IN RCRD: CPT | Performed by: NURSE PRACTITIONER

## 2024-12-19 PROCEDURE — 99212 OFFICE O/P EST SF 10 MIN: CPT | Performed by: PLASTIC SURGERY

## 2024-12-19 PROCEDURE — 1157F ADVNC CARE PLAN IN RCRD: CPT | Performed by: NURSE PRACTITIONER

## 2024-12-19 RX ORDER — ACETAZOLAMIDE 125 MG/1
TABLET ORAL
Qty: 10 TABLET | Refills: 1 | Status: SHIPPED | OUTPATIENT
Start: 2024-12-19

## 2024-12-19 ASSESSMENT — ANXIETY QUESTIONNAIRES
5. BEING SO RESTLESS THAT IT IS HARD TO SIT STILL: NOT AT ALL
IF YOU CHECKED OFF ANY PROBLEMS ON THIS QUESTIONNAIRE, HOW DIFFICULT HAVE THESE PROBLEMS MADE IT FOR YOU TO DO YOUR WORK, TAKE CARE OF THINGS AT HOME, OR GET ALONG WITH OTHER PEOPLE: NOT DIFFICULT AT ALL
4. TROUBLE RELAXING: NOT AT ALL
1. FEELING NERVOUS, ANXIOUS, OR ON EDGE: NOT AT ALL
GAD7 TOTAL SCORE: 1
2. NOT BEING ABLE TO STOP OR CONTROL WORRYING: SEVERAL DAYS
6. BECOMING EASILY ANNOYED OR IRRITABLE: NOT AT ALL
3. WORRYING TOO MUCH ABOUT DIFFERENT THINGS: NOT AT ALL
7. FEELING AFRAID AS IF SOMETHING AWFUL MIGHT HAPPEN: NOT AT ALL

## 2024-12-19 NOTE — PROGRESS NOTES
An interactive audio/visual telecommunication system which permits real time communications between the patient (at the originating site) and provider (at the distant site) was utilized to provide this telehealth service.    Verbal consent was requested and obtained from the patient for this telehealth visit.  We have confirmed the patient wishes to see me, Prisca Toledo, a board certified family nurse practitioner with an active [facility] license as well as verified the patient's identity and physical location in [place].    Problem List Items Addressed This Visit          Medium    Anxiety    Overview     RONNY: 7/12/23 = 17  RONNY 12/19/24 = 1  Uses xanax sparingly           Relevant Orders    Opiate/Opioid/Benzo Prescription Compliance    Medication management    Overview     CSA, UDS, OARRS, CSV [facility] compliant    Q3m         Relevant Orders    Opiate/Opioid/Benzo Prescription Compliance     Other Visit Diagnoses       Altitude sickness preventative measures    -  Primary    uses Acetazolamide prn    Relevant Medications    acetaZOLAMIDE (Diamox) 125 mg tablet           Flying to [place]  Gets altitude sick while there  Acetazolamide used prior  Tolerated  Will use preventively  Departing tomorrow     Controlled Substance Visit:  I have personally reviewed the OARRS report and have considered the risks of abuse, dependence, addiction and diversion and I believe that it is clinically appropriate for the patient to be prescribed this medication.    Is the patient prescribed a combination of a benzodiazepine and opioid?  No    Last Urine Drug Screen / ordered today: Yes  No results found for this or any previous visit (from the past 8760 hours).  Results are as expected.     Controlled Substance Agreement:  Date of the Last Agreement: reviewed 12/19/24   I have reviewed each line item on the Controlled Substance Agreement including, but not limited to, the benefits, risks, and alternatives to treatment with a Controlled  Substance medication(s). The patient has verbalized understanding and signed the agreement.    Benzodiazepines:  What is the patient's goal of therapy? Anxiety   Is this being achieved with current treatment? Yes     RONNY-7:  Over the last 2 weeks, how often have you been bothered by any of the following problems?  Feeling nervous, anxious, or on edge: 0  Not being able to stop or control worryin  Worrying too much about different things: 0  Trouble relaxin  Being so restless that it is hard to sit still: 0  Becoming easily annoyed or irritable: 0  Feeling afraid as if something awful might happen: 0  RONNY-7 Total Score: 1        Activities of Daily Living:   Is your overall impression that this patient is benefiting (symptom reduction outweighs side effects) from benzodiazepine therapy? Yes     1. Physical Functioning: Better  2. Family Relationship: Better  3. Social Relationship: Better  4. Mood: Better  5. Sleep Patterns: Better  6. Overall Function: Better    Gen: Alert, NAD  Respiratory:  resp effort NL, speaking in complete sentences   Neuro:  coordination intact   Mood: normal    Sitting upright

## 2024-12-20 ENCOUNTER — LAB (OUTPATIENT)
Dept: LAB | Facility: LAB | Age: 69
End: 2024-12-20
Payer: MEDICARE

## 2024-12-20 DIAGNOSIS — Z79.899 MEDICATION MANAGEMENT: ICD-10-CM

## 2024-12-20 DIAGNOSIS — F41.9 ANXIETY: ICD-10-CM

## 2024-12-20 LAB
AMPHETAMINES UR QL SCN: NORMAL
BARBITURATES UR QL SCN: NORMAL
BZE UR QL SCN: NORMAL
CANNABINOIDS UR QL SCN: NORMAL
CREAT UR-MCNC: 102.1 MG/DL (ref 20–320)
PCP UR QL SCN: NORMAL

## 2024-12-20 PROCEDURE — 80368 SEDATIVE HYPNOTICS: CPT

## 2024-12-20 PROCEDURE — 80361 OPIATES 1 OR MORE: CPT

## 2024-12-20 PROCEDURE — 80354 DRUG SCREENING FENTANYL: CPT

## 2024-12-20 PROCEDURE — 80358 DRUG SCREENING METHADONE: CPT

## 2024-12-20 PROCEDURE — 80346 BENZODIAZEPINES1-12: CPT

## 2024-12-20 PROCEDURE — 80307 DRUG TEST PRSMV CHEM ANLYZR: CPT

## 2024-12-20 PROCEDURE — 82570 ASSAY OF URINE CREATININE: CPT

## 2024-12-20 PROCEDURE — 80373 DRUG SCREENING TRAMADOL: CPT

## 2024-12-20 PROCEDURE — 80365 DRUG SCREENING OXYCODONE: CPT

## 2024-12-27 ENCOUNTER — TELEPHONE (OUTPATIENT)
Dept: CARDIOLOGY | Facility: CLINIC | Age: 69
End: 2024-12-27
Payer: MEDICARE

## 2024-12-27 NOTE — TELEPHONE ENCOUNTER
POS request from:  [facility]    Surgery date:  1/30/25    Type of surgery: Endoscopy    Facility:  [facility]    Phone:  [phone]    Fax:  [phone]      Per Dr. Russell Palma , Patient is an acceptable cardiac risk for upcoming surgery.  Form completed and faxed to    [phone]    Fax confirmation received

## 2025-01-09 ENCOUNTER — HOSPITAL ENCOUNTER (OUTPATIENT)
Dept: RADIOLOGY | Facility: CLINIC | Age: 70
Discharge: HOME | End: 2025-01-09
Payer: MEDICARE

## 2025-01-09 DIAGNOSIS — R31.29 OTHER MICROSCOPIC HEMATURIA: ICD-10-CM

## 2025-01-09 LAB
CREAT SERPL-MCNC: 0.7 MG/DL (ref 0.6–1.3)
GFR SERPL CREATININE-BSD FRML MDRD: >90 ML/MIN/1.73M*2

## 2025-01-09 PROCEDURE — 82565 ASSAY OF CREATININE: CPT

## 2025-01-09 PROCEDURE — 76377 3D RENDER W/INTRP POSTPROCES: CPT

## 2025-01-09 PROCEDURE — 2550000001 HC RX 255 CONTRASTS: Performed by: NURSE PRACTITIONER

## 2025-01-09 RX ADMIN — IOHEXOL 75 ML: 350 INJECTION, SOLUTION INTRAVENOUS at 12:13

## 2025-01-13 DIAGNOSIS — R31.29 OTHER MICROSCOPIC HEMATURIA: Primary | ICD-10-CM

## 2025-01-16 ENCOUNTER — HOSPITAL ENCOUNTER (OUTPATIENT)
Dept: RADIOLOGY | Facility: CLINIC | Age: 70
Discharge: HOME | End: 2025-01-16
Payer: MEDICARE

## 2025-01-16 DIAGNOSIS — Z78.0 MENOPAUSE: ICD-10-CM

## 2025-01-16 PROCEDURE — 77080 DXA BONE DENSITY AXIAL: CPT | Performed by: RADIOLOGY

## 2025-01-16 PROCEDURE — 77080 DXA BONE DENSITY AXIAL: CPT

## 2025-01-23 ENCOUNTER — APPOINTMENT (OUTPATIENT)
Dept: UROLOGY | Facility: CLINIC | Age: 70
End: 2025-01-23
Payer: COMMERCIAL

## 2025-01-23 VITALS
DIASTOLIC BLOOD PRESSURE: 84 MMHG | WEIGHT: 129 LBS | SYSTOLIC BLOOD PRESSURE: 151 MMHG | TEMPERATURE: 97.4 F | BODY MASS INDEX: 22.86 KG/M2 | HEART RATE: 94 BPM | HEIGHT: 63 IN

## 2025-01-23 DIAGNOSIS — R31.29 OTHER MICROSCOPIC HEMATURIA: Primary | ICD-10-CM

## 2025-01-23 DIAGNOSIS — N95.8 GENITOURINARY SYNDROME OF MENOPAUSE: ICD-10-CM

## 2025-01-23 DIAGNOSIS — R39.15 URGENCY OF URINATION: ICD-10-CM

## 2025-01-23 LAB
POC APPEARANCE, URINE: CLEAR
POC BILIRUBIN, URINE: NEGATIVE
POC BLOOD, URINE: ABNORMAL
POC COLOR, URINE: YELLOW
POC GLUCOSE, URINE: NEGATIVE MG/DL
POC KETONES, URINE: NEGATIVE MG/DL
POC LEUKOCYTES, URINE: NEGATIVE
POC NITRITE,URINE: NEGATIVE
POC PH, URINE: 7 PH
POC PROTEIN, URINE: NEGATIVE MG/DL
POC SPECIFIC GRAVITY, URINE: 1.02
POC UROBILINOGEN, URINE: 0.2 EU/DL

## 2025-01-23 PROCEDURE — 1123F ACP DISCUSS/DSCN MKR DOCD: CPT | Performed by: NURSE PRACTITIONER

## 2025-01-23 PROCEDURE — 3008F BODY MASS INDEX DOCD: CPT | Performed by: NURSE PRACTITIONER

## 2025-01-23 PROCEDURE — G2211 COMPLEX E/M VISIT ADD ON: HCPCS | Performed by: NURSE PRACTITIONER

## 2025-01-23 PROCEDURE — 1157F ADVNC CARE PLAN IN RCRD: CPT | Performed by: NURSE PRACTITIONER

## 2025-01-23 PROCEDURE — 99214 OFFICE O/P EST MOD 30 MIN: CPT | Performed by: NURSE PRACTITIONER

## 2025-01-23 PROCEDURE — 1159F MED LIST DOCD IN RCRD: CPT | Performed by: NURSE PRACTITIONER

## 2025-01-23 PROCEDURE — 1036F TOBACCO NON-USER: CPT | Performed by: NURSE PRACTITIONER

## 2025-01-23 PROCEDURE — 81003 URINALYSIS AUTO W/O SCOPE: CPT | Performed by: NURSE PRACTITIONER

## 2025-01-23 PROCEDURE — 51798 US URINE CAPACITY MEASURE: CPT | Performed by: NURSE PRACTITIONER

## 2025-01-23 RX ORDER — ESTRADIOL 0.1 MG/G
CREAM VAGINAL
Qty: 42.5 G | Refills: 5 | Status: SHIPPED | OUTPATIENT
Start: 2025-01-23 | End: 2026-01-23

## 2025-01-23 NOTE — PROGRESS NOTES
"01/23/25   [id number]    Chief Complaint:   microscopic hematuria, OAB, pelvic exam    Subjective      HPI Delaney Ch \"Lee\" is a 69 y.o. female who presents for microscopic hematuria, pelvic exam; Kindly referred by ; last seen virtually on 12/12/24;     High Risk Microscopic hematuria evaluation  CT Urogram: no upper tract source for hematuria noted; no stones, no masses, no hydronephrosis;     Scheduled for cystoscopy w Dr. 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, Works as dentist, chemicals (all bonding agents)    Imported last notes on 12/12/24 detailed history  9/6, 9/10, and 9/26/24 rbc 3-5/hpf past few mos;    9/1/23 rbc 2/hpf    No gross hematuria, one kidney stone many years ago, passed on her own;     Some urinary urgency, no frequency or leakage; does kegels, wears pad for security; doesn't have to change pad; no sensation of prolapse;     Still with uterus and ovaries; abdominoplasty 20 yrs ago; vaginal bleeding 4 mos ago, saw GYN TVUS done and no concerns;     Never smoked  No family hx of urological cancers  No family hx Nieves syndrome  Works as dentist, chemicals (all bonding agents)  No unintentional weight loss;     Recently mom who lived with her passed away 6 mos ago, she has lost two husbands passed away, two boyfriends have also passed away;       Objective     /84   Pulse 94   Temp 36.3 °C (97.4 °F)   Ht 1.6 m (5' 3\")   Wt 58.5 kg (129 lb)   BMI 22.85 kg/m²    Physical Exam  Genitourinary:     Comments: No vulvar lesions, neg " Qtip tenderness, mild to mod atrophy  Neg CST lying down, Neg levator ani tenderness or tightness  Minimal stage I cystocele, no rectocele or uterine prolapse  Non tender ovaries/uterus, difficult exam d/t  body habitus   No rectal exam done      General: Appears comfortable and in no apparent distress, well nourished  Head: Normocephalic, atraumatic  Neck: trachea midline  Respiratory: respirations unlabored, no wheezes, and no use of accessory muscles  Cardiovascular: at rest no dyspnea, well perfused  Skin: no visible rashes or lesions  Neurologic: grossly intact, oriented to person, place, and time  Psychiatric: mood and affect appropriate  Musculoskeletal: in chair for appt. no difficulty w upper body movement      Assessment/Plan   Problem List Items Addressed This Visit    None  Visit Diagnoses       Other microscopic hematuria    -  Primary    Relevant Orders    POCT UA Automated manually resulted (Completed)    Post-Void Residual (Completed)    Urgency of urination        Relevant Orders    POCT UA Automated manually resulted (Completed)    Post-Void Residual (Completed)            Orders Placed This Encounter   Procedures    Post-Void Residual    POCT UA Automated manually resulted     Order Specific Question:   Release result to [facility]     Answer:   Immediate [1]      High Risk Microscopic hematuria evaluation  CT Urogram: no upper tract source for hematuria noted; no stones, no masses, no hydronephrosis    Scheduled for cystoscopy on 2/13/25 we Dr. Nichole, order for urine culture 2 weeks prior in system; walk in  lab;       Plan:   Estrogen vaginal cream  Apply pea size amount 0.5 gram to vaginal opening with finger daily  for 2 weeks, then 2-3 times per week.    Will look into non silicone devices for patient    Cystoscopy scheduled with Dr. Nichole    Discussed behavioral modification, voiding diary  May consider PFPT Christelle or Rita Cortes, had been recommended in past    Myrbetriq interacts with  flecainide  Gemtesa not covered  Discussed trospium,may consider    Discussed 3rd line options, sacral neuromodulation axonics, Percutaneous tibial stimulation, revi brochure, Botox injections, would need urodynamics first    Nurse line -[phone]    1. Scheduled already for cystoscopy  2. Follow up Ban 3-4 mos phone visit Friday morning    Ban one year microscopic hematuria [place]    Nurse line [phone]  Ban Ndiaye, APRN-CNP  Lab Results   Component Value Date    GLUCOSE 82 09/26/2024    CALCIUM 9.1 09/26/2024     (L) 09/26/2024    K 4.5 09/26/2024    CO2 26 09/26/2024    CL 96 (L) 09/26/2024    BUN 6 09/26/2024    CREATININE 0.60 09/26/2024

## 2025-01-23 NOTE — PATIENT INSTRUCTIONS
Plan:   Estrogen vaginal cream  Apply pea size amount 0.5 gram to vaginal opening with finger daily  for 2 weeks, then 2-3 times per week.    Will look into non silicone devices for patient    Cystoscopy scheduled with Dr. Nichole    Discussed behavioral modification, voiding diary  May consider PFPT Christelle or Rita Cortes, had been recommended in past    Myrbetriq interacts with flecainide  Gemtesa not covered  Discussed trospium,may consider    Discussed 3rd line options, sacral neuromodulation axonics, Percutaneous tibial stimulation, Revi brochure  Botox injections, would need urodynamics first     Nurse line -[phone]    1. Scheduled already for cystoscopy  2. Follow up Ban 3-4 mos phone visit Friday morning

## 2025-02-10 NOTE — PROGRESS NOTES
"Subjective   Patient ID: Delaney Ch \"Lee\" is a 69 y.o. female who presents for No chief complaint on file..  HPI  69 y.o.  patient with microscopic hematuria presenting for cystoscopic evaluation today 2/10/2025    She explains that she is . She has started vaginal estrogen.     CT urogram from 1/11/25:  NO ACUTE OR CONTRIBUTORY ABNORMALITY      NO STONE ANYWHERE IN THE URINARY TRACT      NO HYDROURETERONEPHROSIS ON EITHER SIDE      NO EVIDENCE OF ACUTE INFLAMMATION ANYWHERE IN THE URINARY TRACT      NO SOLID RENAL PARENCHYMAL MASS      NO EVIDENCE OF UROTHELIAL LESION IN THE OPACIFIED URINARY TRACT,  NOTING THAT THE FOLLOWING WAS / WERE NOT WELL ENOUGH OPACIFIED WITH  EXCRETED CONTRAST DURING THE EXCRETORY PHASE OF TODAY'S EXAM TO  EVALUATE DIRECTLY: THE NONDEPENDENT ONE QUARTER OF THE URINARY  BLADDER; THE DISTAL ONE HALF OF THE RIGHT URETER; THE PROXIMAL ONE  THIRD AND THE DISTAL ONE THIRD OF THE LEFT URETER.  OF THE PORTIONS  OF THE URETERS NOT WELL ENOUGH OPACIFIED TO EVALUATE DIRECTLY, NONE  WAS PARTICULARLY EXPANSILE TO SUGGEST AND UNDERLYING SUBSTANTIAL  SIZED OCCULT UROTHELIAL LESION      NO VARIANT ANATOMY SUCH AS URACHAL REMNANT OR DUPLICATED/ECTOPIC  URETERS      NO ACUTE UNANTICIPATED PROCESS IN THE ABDOMEN OR PELVIS OUTSIDE THE  URINARY TRACT    Last seen by Ban Ndiaye: 1/23/2025   69 y.o. female who presents for microscopic hematuria, pelvic exam; Kindly referred by ; last seen virtually on 12/12/24;      High Risk Microscopic hematuria evaluation  CT Urogram: no upper tract source for hematuria noted; no stones, no masses, no hydronephrosis;      Scheduled for cystoscopy w Dr. 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 Patient is postmenopausal.  There is no height or weight on file to calculate BMI.  Visit Vitals  OB Status Postmenopausal   Smoking Status Never     General Appearance: well appearing  Neuro: Alert and oriented         PVR (by Ultrasound): 0   Urine dip: No results found for this or any previous visit (from the past 6 hours).    Imaging and results:    Patient ID: Delaney Ch \"Declan" is a 69 y.o. female.    Cystoscopy    Date/Time: 2/13/2025 11:43 AM    Performed by: Jose Nichole MD  Authorized by: Jose Nichole MD    Procedure - Bladder Cystoscopy:     Procedure details: cystoscopy    Post-procedure:     Patient tolerance: Patient tolerated the procedure well with no immediate complications      Comments:      Risks of cystoscopy were discussed with the patient in great detail, including the risk of hematuria, UTI and discomfort.      Cystoscopy was completed today without complication and she tolerated the procedure well.     - The anterior urethra is normal.     -There is mild trabeculation.    -Careful inspection of the bladder revealed there to be no evidence of tumor, stones, foreign bodies or diverticula.    -There is clear efflux from each orifice.           PROCEDURE NOTE:     OPERATION:  Flexible Cystourethroscopy     SURGEON: Jose Nichole MD     ANESTHESIA:  2% lidocaine jelly     COMPLICATIONS:  None     SPECIMEN:  Voided urine was not collected and submitted for cytology.     Estimated Blood Loss: Minimal      Complications: None      Patient presented for cystoscopy. They were brought to the procedure room, they were consented, the patient was prepped in normal fashion and placed on the " cystobed.     A flexible scope was inserted and the entire urethra and bladder were examined.? A complete cystoscopy was performed. There were no mucosal lesions noted. The ureteral orifices were in normal location.  Mild trabeculation noted throughout. No tumors or stones noted. Area of resection of left bladder wall with no overlying mucosal changes.      The patient tolerated the procedure well. There were no complications. Routine post-cystoscopy instructions were given.    Assessment/Plan   69 y.o.  patient with microscopic hematuria presenting for cystoscopic evaluation today 2/10/2025    Patient underwent cystoscopic evaluation today which showed mild trabeculation but no tumors or masses. We recommend continuing with the vaginal estrogen. She will continue to follow up with Ban Ndiaye. We discussed that if she sees any visual blood we would like to see her back.       Follow up with Ban Ndiaye as scheduled.       Scribe Attestation  By signing my name below, IEvie Scribe attest that this documentation has been prepared under the direction and in the presence of Jose Nichole MD. All medical record entries made by the Scribe were at my direction or personally dictated by me. I have reviewed the chart and agree that the record accurately reflects my personal performance of the history, physical exam, discussion and plan.

## 2025-02-13 ENCOUNTER — APPOINTMENT (OUTPATIENT)
Dept: UROLOGY | Facility: CLINIC | Age: 70
End: 2025-02-13
Payer: COMMERCIAL

## 2025-02-13 VITALS
DIASTOLIC BLOOD PRESSURE: 88 MMHG | BODY MASS INDEX: 23.7 KG/M2 | WEIGHT: 138.8 LBS | HEART RATE: 118 BPM | SYSTOLIC BLOOD PRESSURE: 143 MMHG | HEIGHT: 64 IN

## 2025-02-13 DIAGNOSIS — R31.29 OTHER MICROSCOPIC HEMATURIA: ICD-10-CM

## 2025-02-13 DIAGNOSIS — F41.9 ANXIETY: ICD-10-CM

## 2025-02-13 LAB
POC APPEARANCE, URINE: CLEAR
POC BILIRUBIN, URINE: NEGATIVE
POC BLOOD, URINE: ABNORMAL
POC COLOR, URINE: YELLOW
POC GLUCOSE, URINE: NEGATIVE MG/DL
POC KETONES, URINE: NEGATIVE MG/DL
POC LEUKOCYTES, URINE: NEGATIVE
POC NITRITE,URINE: NEGATIVE
POC PH, URINE: 6 PH
POC PROTEIN, URINE: NEGATIVE MG/DL
POC SPECIFIC GRAVITY, URINE: 1.01
POC UROBILINOGEN, URINE: 0.2 EU/DL

## 2025-02-13 PROCEDURE — 81003 URINALYSIS AUTO W/O SCOPE: CPT | Performed by: UROLOGY

## 2025-02-13 PROCEDURE — 52000 CYSTOURETHROSCOPY: CPT | Performed by: UROLOGY

## 2025-02-13 RX ORDER — ALPRAZOLAM 0.5 MG/1
0.5 TABLET, EXTENDED RELEASE ORAL AS NEEDED
Qty: 21 TABLET | Refills: 0 | Status: SHIPPED | OUTPATIENT
Start: 2025-02-13

## 2025-02-15 LAB — BACTERIA UR CULT: NORMAL

## 2025-02-17 ENCOUNTER — APPOINTMENT (OUTPATIENT)
Dept: ORTHOPEDIC SURGERY | Facility: CLINIC | Age: 70
End: 2025-02-17
Payer: MEDICARE

## 2025-02-17 DIAGNOSIS — M19.039 WRIST ARTHRITIS: Primary | ICD-10-CM

## 2025-02-17 DIAGNOSIS — G56.01 CARPAL TUNNEL SYNDROME OF RIGHT WRIST: ICD-10-CM

## 2025-02-17 PROCEDURE — 1159F MED LIST DOCD IN RCRD: CPT | Performed by: STUDENT IN AN ORGANIZED HEALTH CARE EDUCATION/TRAINING PROGRAM

## 2025-02-17 PROCEDURE — 20605 DRAIN/INJ JOINT/BURSA W/O US: CPT | Mod: RT | Performed by: STUDENT IN AN ORGANIZED HEALTH CARE EDUCATION/TRAINING PROGRAM

## 2025-02-17 PROCEDURE — 1123F ACP DISCUSS/DSCN MKR DOCD: CPT | Performed by: STUDENT IN AN ORGANIZED HEALTH CARE EDUCATION/TRAINING PROGRAM

## 2025-02-17 PROCEDURE — 1036F TOBACCO NON-USER: CPT | Performed by: STUDENT IN AN ORGANIZED HEALTH CARE EDUCATION/TRAINING PROGRAM

## 2025-02-17 PROCEDURE — 99214 OFFICE O/P EST MOD 30 MIN: CPT | Mod: 25 | Performed by: STUDENT IN AN ORGANIZED HEALTH CARE EDUCATION/TRAINING PROGRAM

## 2025-02-17 PROCEDURE — 99214 OFFICE O/P EST MOD 30 MIN: CPT | Performed by: STUDENT IN AN ORGANIZED HEALTH CARE EDUCATION/TRAINING PROGRAM

## 2025-02-17 PROCEDURE — 2500000004 HC RX 250 GENERAL PHARMACY W/ HCPCS (ALT 636 FOR OP/ED): Performed by: STUDENT IN AN ORGANIZED HEALTH CARE EDUCATION/TRAINING PROGRAM

## 2025-02-17 PROCEDURE — 20526 THER INJECTION CARP TUNNEL: CPT | Mod: RT | Performed by: STUDENT IN AN ORGANIZED HEALTH CARE EDUCATION/TRAINING PROGRAM

## 2025-02-17 PROCEDURE — 1157F ADVNC CARE PLAN IN RCRD: CPT | Performed by: STUDENT IN AN ORGANIZED HEALTH CARE EDUCATION/TRAINING PROGRAM

## 2025-02-17 RX ORDER — LIDOCAINE HYDROCHLORIDE 10 MG/ML
1 INJECTION, SOLUTION INFILTRATION; PERINEURAL
Status: COMPLETED | OUTPATIENT
Start: 2025-02-17 | End: 2025-02-17

## 2025-02-17 RX ORDER — LIDOCAINE HYDROCHLORIDE 10 MG/ML
0.5 INJECTION, SOLUTION INFILTRATION; PERINEURAL
Status: COMPLETED | OUTPATIENT
Start: 2025-02-17 | End: 2025-02-17

## 2025-02-17 RX ADMIN — TRIAMCINOLONE ACETONIDE 10 MG: 10 INJECTION, SUSPENSION INTRA-ARTICULAR; INTRALESIONAL at 15:25

## 2025-02-17 RX ADMIN — LIDOCAINE HYDROCHLORIDE 0.5 ML: 10 INJECTION, SOLUTION INFILTRATION; PERINEURAL at 15:25

## 2025-02-17 RX ADMIN — LIDOCAINE HYDROCHLORIDE 1 ML: 10 INJECTION, SOLUTION INFILTRATION; PERINEURAL at 15:25

## 2025-02-17 NOTE — PROGRESS NOTES
History of Present Illness  Patient returns today for evaluation of right radiocarpal arthritis.  Previous injection 9/5/2024.  Recurrence of pain over the past few weeks.  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    Additionally today complaining of weakness to the hand and numbness to the thumb index and long finger.  States this is present at all time but worsened at work.  Additionally starting to drop things.  Positive Phalen Durkan Tinel right carpal tunnel    Physical Examination:  Right upper extremity   The patient appears to be their stated age, is in no apparent distress, and is oriented x3. The patients mood and affect are appropriate. The patients gait is normal. The examination of the limb in question was performed in comparison to the contralateral limb.    On musculoskeletal examination, pain over the right radiocarpal joint.  Minimal pain over the right thumb CMC.: positive phalen, durkan, tinel carpal tunnel    Sensation and motor function are intact in the radial, and median nerve distribution. There is no obvious thenar atrophy, and thenar strength is 5/5. There is no intrinsic atrophy, and intrinsic strength is 5/5.  The patient can make a full composite fist. The hand itself is warm and well perfused. The skin is intact throughout. The contralateral hand/wrist are normal to inspection, range of motion, stability, and strength.    M Inj/Asp: R radiocarpal on 2/17/2025 3:25 PM  Indications: pain  Details: 24 G needle, volar approach  Medications: 10 mg triamcinolone acetonide 10 mg/mL; 1 mL lidocaine 10 mg/mL (1 %)  Outcome: tolerated well, no immediate complications  Procedure, treatment alternatives, risks and benefits explained, specific risks discussed. Consent was given by the patient. Immediately prior to procedure a time out was called to verify the correct patient, procedure, equipment, support staff and site/side marked as required. Patient was prepped and  draped in the usual sterile fashion.       Hand / UE Inj/Asp: R carpal tunnel for carpal tunnel syndrome on 2/17/2025 3:25 PM  Indications: pain and diagnostic  Details: 24 G needle, volar approach  Medications: 10 mg triamcinolone acetonide 10 mg/mL; 0.5 mL lidocaine 10 mg/mL (1 %)  Outcome: tolerated well, no immediate complications  Procedure, treatment alternatives, risks and benefits explained, specific risks discussed. Immediately prior to procedure a time out was called to verify the correct patient, procedure, equipment, support staff and site/side marked as required. Patient was prepped and draped in the usual sterile fashion.             Assessment:  Patient with recurrent right wrist arthritis.  Interested in repeat wrist injection today.  Additionally today discussed right carpal tunnel symptoms.  We will proceed with a carpal tunnel injection today.    Plan:   Injection.  I explained the risks and benefits of an injection. Specifically, I reviewed the risks of injection, which include, but are not limited to, infection, bleeding, nerve injury, pain, steroid flare, glycemic alteration, subcutaneous fat atrophy, skin hypopigmentation, soft tissue damage, and incomplete symptom relief. At this time, the patient would like to proceed with an injection. After obtaining consent, I injected a 1mL combination of Kenalog and 1% lidocaine into right radiocarpal joint and right carpal tunnel, sterile technique. The injection site was dressed, and the patient tolerated the injection well. I am hopeful that this injection will serve diagnostic, prognostic, and therapeutic purposes. Finally, I have emphasized patience, as any benefit may take several weeks to manifest. Depending on the success of the injection, I will see them back PRERWIN Finnegan MD

## 2025-02-21 ENCOUNTER — APPOINTMENT (OUTPATIENT)
Dept: SURGERY | Facility: CLINIC | Age: 70
End: 2025-02-21
Payer: MEDICARE

## 2025-02-21 ENCOUNTER — APPOINTMENT (OUTPATIENT)
Dept: CARDIOLOGY | Facility: CLINIC | Age: 70
End: 2025-02-21
Payer: MEDICARE

## 2025-02-21 VITALS
BODY MASS INDEX: 23.05 KG/M2 | SYSTOLIC BLOOD PRESSURE: 132 MMHG | WEIGHT: 135 LBS | HEIGHT: 64 IN | DIASTOLIC BLOOD PRESSURE: 78 MMHG

## 2025-02-21 VITALS
HEIGHT: 64 IN | SYSTOLIC BLOOD PRESSURE: 146 MMHG | WEIGHT: 135 LBS | BODY MASS INDEX: 23.05 KG/M2 | DIASTOLIC BLOOD PRESSURE: 89 MMHG

## 2025-02-21 DIAGNOSIS — R19.00 PALPABLE ABDOMINAL MASS: ICD-10-CM

## 2025-02-21 DIAGNOSIS — I49.1 PAC (PREMATURE ATRIAL CONTRACTION): ICD-10-CM

## 2025-02-21 DIAGNOSIS — E78.2 HYPERLIPIDEMIA, MIXED: ICD-10-CM

## 2025-02-21 DIAGNOSIS — R55 SYNCOPE AND COLLAPSE: ICD-10-CM

## 2025-02-21 DIAGNOSIS — Z71.89 ENCOUNTER TO DISCUSS TREATMENT OPTIONS: ICD-10-CM

## 2025-02-21 DIAGNOSIS — Z78.9 NEVER SMOKED CIGARETTES: ICD-10-CM

## 2025-02-21 DIAGNOSIS — Z71.89 ENCOUNTER FOR MEDICATION REVIEW AND COUNSELING: ICD-10-CM

## 2025-02-21 DIAGNOSIS — Z86.79 H/O: HYPERTENSION: ICD-10-CM

## 2025-02-21 DIAGNOSIS — I48.21 PERMANENT ATRIAL FIBRILLATION (MULTI): ICD-10-CM

## 2025-02-21 DIAGNOSIS — I48.11 LONGSTANDING PERSISTENT ATRIAL FIBRILLATION (MULTI): Primary | ICD-10-CM

## 2025-02-21 PROCEDURE — 1159F MED LIST DOCD IN RCRD: CPT | Performed by: SURGERY

## 2025-02-21 PROCEDURE — 1157F ADVNC CARE PLAN IN RCRD: CPT | Performed by: INTERNAL MEDICINE

## 2025-02-21 PROCEDURE — 1159F MED LIST DOCD IN RCRD: CPT | Performed by: INTERNAL MEDICINE

## 2025-02-21 PROCEDURE — 93000 ELECTROCARDIOGRAM COMPLETE: CPT | Performed by: INTERNAL MEDICINE

## 2025-02-21 PROCEDURE — 1157F ADVNC CARE PLAN IN RCRD: CPT | Performed by: SURGERY

## 2025-02-21 PROCEDURE — 1036F TOBACCO NON-USER: CPT | Performed by: INTERNAL MEDICINE

## 2025-02-21 PROCEDURE — 99214 OFFICE O/P EST MOD 30 MIN: CPT | Performed by: SURGERY

## 2025-02-21 PROCEDURE — 3008F BODY MASS INDEX DOCD: CPT | Performed by: SURGERY

## 2025-02-21 PROCEDURE — 1123F ACP DISCUSS/DSCN MKR DOCD: CPT | Performed by: SURGERY

## 2025-02-21 PROCEDURE — 99214 OFFICE O/P EST MOD 30 MIN: CPT | Performed by: INTERNAL MEDICINE

## 2025-02-21 PROCEDURE — 3008F BODY MASS INDEX DOCD: CPT | Performed by: INTERNAL MEDICINE

## 2025-02-21 PROCEDURE — 1123F ACP DISCUSS/DSCN MKR DOCD: CPT | Performed by: INTERNAL MEDICINE

## 2025-02-21 RX ORDER — METOPROLOL SUCCINATE 50 MG/1
50 TABLET, EXTENDED RELEASE ORAL DAILY
Qty: 90 TABLET | Refills: 3 | Status: SHIPPED | OUTPATIENT
Start: 2025-02-21 | End: 2026-02-21

## 2025-02-21 ASSESSMENT — ENCOUNTER SYMPTOMS
PALPITATIONS: 0
DYSPNEA ON EXERTION: 0

## 2025-02-21 NOTE — PATIENT INSTRUCTIONS
Continue same medications/treatment.  Patient educated on proper medication use.  Patient educated on risk factor modification.  Please bring any lab results from other providers/physicians to your next appointment.    Please bring all medicines, vitamins, and herbal supplements with you when you come to the office.    Prescriptions will not be filled unless you are compliant with your follow up appointments or have a follow up appointment scheduled as per instruction of your physician. Refills should be requested at the time of your visit.    STOP flecainide  START metoprolol succinate 50mg daily   Follow up with Dr. Mcneill in October I, NATA MICHEL RN, AM SCRIBING FOR AND IN THE PRESENCE OF DR. ALBETRO MCNEILL MD, FACC, FACP, RS

## 2025-02-21 NOTE — PROGRESS NOTES
"Chief Complaint:   Follow-up (A-fib.)     History Of Present Illness:    Lee Ch is a 69 y.o. female presenting with follow-up.  She is feeling better.  Her activity level has improved since her ankle has healed.  She cannot tell if she is in or out of rhythm.  Patient denies any arrhythmia symptoms of palpitation, lightheadedness, near syncope, or syncope.    She prefers conservative care.  She declines any other cardioversion or drug load with medications as she is feeling okay.    Last Recorded Vitals:  Vitals:    02/21/25 1318   BP: 132/78   BP Location: Left arm   Patient Position: Sitting   Weight: 61.2 kg (135 lb)   Height: 1.613 m (5' 3.5\")       Past Medical History:  See List     Past Surgical History:  See List      Social History:  She reports that she has never smoked. She has never used smokeless tobacco. She reports current alcohol use of about 3.0 standard drinks of alcohol per week. She reports that she does not use drugs.  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    Family History:  Family History   Problem Relation Name Age of Onset    Osteoporotic fracture Mother Anne-Marie barrientos     Hyperlipidemia Mother Anne-Marie barrientos     Miscarriages / Stillbirths Mother Anne-Marie barrientos     Other (carotid artery stenosis) Father Ray     Coronary artery disease Father Ray     Other (coronary artery bypass graft) Father Ray     Peripheral vascular disease Father Ray     Abnormal EKG Father Ray     Angina Father Ray     Atrial fibrillation Father Ray     Hypertension Father Ray     Hyperlipidemia Father Ray     Atrial fibrillation Other          Allergies:  Demerol [meperidine], Sutures, Alendronate, Cysteine, Lisinopril, Nifedipine, Rivaroxaban, and Rosuvastatin    Outpatient Medications:  Current Outpatient Medications   Medication Instructions    ALPRAZolam XR (XANAX XR) 0.5 mg, oral, As needed, Do not crush, chew, or split.    calcium carbonate 215 mg calcium (500 mg) " tablet,chewable 1 tablet, Daily PRN    cholecalciferol (Vitamin D-3) 5,000 Units tablet 1 tablet, Daily    Eliquis 5 mg, oral, 2 times daily    estradiol (Estrace) 0.01 % (0.1 mg/gram) vaginal cream Apply blueberry size amount 0.5 gram to vaginal opening with finger daily for 2 weeks, then 2-3 times per week.    ferrous sulfate 325 mg, 2 times weekly    levothyroxine (SYNTHROID, LEVOXYL) 75 mcg, oral, Daily before breakfast    metoprolol succinate XL (TOPROL-XL) 50 mg, oral, Daily, Do not crush or chew.    multivitamin tablet 1 tablet, Daily    omega 3-dha-epa-fish oil (Fish OiL) 1,000 mg (120 mg-180 mg) capsule 1 capsule, Daily    Prolia 60 mg, subcutaneous, Every 6 months    RED BEET ROOT-SOUR CHERRY EXT ORAL 1 tablet, Daily    red yeast rice 600 mg capsule 1 capsule, Daily    vitamins A,C,E-zinc-copper (PreserVision AREDS) 2,148 mcg-113 mg-45 mg-17.4mg tablet 1 tablet, Daily   Review of Systems   Cardiovascular:  Negative for chest pain, dyspnea on exertion and palpitations.   All other systems reviewed and are negative.        Physical Exam:  Constitutional:       General: Awake.      Appearance: Normal and healthy appearance. Well-developed and not in distress.   Neck:      Vascular: No JVR. JVD normal.   Pulmonary:      Effort: Pulmonary effort is normal.      Breath sounds: Normal breath sounds. No wheezing. No rhonchi. No rales.   Chest:      Chest wall: Not tender to palpatation.   Cardiovascular:      PMI at left midclavicular line. Normal rate. Irregularly irregular rhythm. Normal S1. Normal S2.       Murmurs: There is no murmur.      No gallop.  No click. No rub.      Comments: Atrial fibrillation   Pulses:     Intact distal pulses.   Edema:     Peripheral edema absent.   Abdominal:      Tenderness: There is no abdominal tenderness.   Musculoskeletal: Normal range of motion.         General: No tenderness. Skin:     General: Skin is warm and dry.   Neurological:      General: No focal deficit present.       Mental Status: Alert and oriented to person, place and time.            Last Labs:  CBC -  Lab Results   Component Value Date    WBC 6.7 09/06/2024    HGB 13.6 09/06/2024    HCT 40.0 09/06/2024    MCV 95 09/06/2024     09/06/2024       CMP -  Lab Results   Component Value Date    CALCIUM 9.1 09/26/2024    PROT 6.5 09/06/2024    ALBUMIN 4.0 09/06/2024    AST 21 09/06/2024    ALT 10 09/06/2024    ALKPHOS 72 09/06/2024    BILITOT 1.2 09/06/2024       LIPID PANEL -   Lab Results   Component Value Date    CHOL 239 (H) 09/06/2024    TRIG 52 09/06/2024    .1 09/06/2024    CHHDL 2.1 09/06/2024    LDLF 126 (H) 09/01/2023    VLDL 10 09/06/2024    NHDL 123 09/06/2024       RENAL FUNCTION PANEL -   Lab Results   Component Value Date    GLUCOSE 82 09/26/2024     (L) 09/26/2024    K 4.5 09/26/2024    CL 96 (L) 09/26/2024    CO2 26 09/26/2024    ANIONGAP 13 09/26/2024    BUN 6 09/26/2024    CREATININE 0.60 09/26/2024    CALCIUM 9.1 09/26/2024    ALBUMIN 4.0 09/06/2024        Lab Results   Component Value Date    HGBA1C 3.9 05/09/2024       Last Cardiology Tests:  ECG:  ECG 12 lead (Clinic Performed) 06/18/2024    Today.  Atrial fibrillation.  Left axis deviation.  Corrected QT interval 400 ms.  Controlled ventricular rate    Lab review: I have personally reviewed the laboratory result(s) see above    Assessment/Plan   Diagnoses and all orders for this visit:  Longstanding persistent atrial fibrillation (Multi)  -     metoprolol succinate XL (Toprol-XL) 50 mg 24 hr tablet; Take 1 tablet (50 mg) by mouth once daily. Do not crush or chew.  H/O: hypertension  Hyperlipidemia, mixed  PAC (premature atrial contraction)  Syncope and collapse  BMI 23.0-23.9, adult  Encounter for medication review and counseling  Encounter to discuss treatment options  Never smoked cigarettes  Permanent atrial fibrillation (Multi)    Longstanding persistent atrial fibrillation, s/p [place] chemical cardioversion and multiple  recurrences with propafenone then flecainide.  She prefers to be off antiarrhythmic medications.  She is back in atrial fibrillation.  Her atrial fibrillation likely has transitioned from longstanding persistent atrial fibrillation to permanent atrial fibrillation.  She declines any procedures.  She declined change in antiarrhythmic and hospitalization for antiarrhythmic drug loading.  Also declining cryoablation PVI, although in the past she had agreed to it.  Now, she prefers rate control and anticoagulation.  No longer attempt to restore sinus rhythm. Will discontinue flecainide.  Increase metoprolol.  See prescription.  High risk.med Eliquis - continue longterm. Refill discussed. Also discussed referral for watchman in the past and she declined in the past and again today.  Vasodepressor syncope. Chronic. Stable. Reenforce behavioral modification including increased po fluid intake and use of compression stockings.   Other medical issues of hypertension, impaired diastolic filling, hyperlipidemia, Raynaud's, hypothyroidism, knee arthritis, and previous orthopedic surgeries noted  AHA recommendations for exercise, diet, and behavioral modification reviewed with pt.     Counseling greater than 50% of the visit performed.  The patient and I discussed types of atrial fibrillation, transition to permanent atrial fibrillation, rate control anticoagulation, previously declined [place] watchman trial, currently declined changing antiarrhythmic medication or PVI, requested rate control anticoagulation, or indications for medications and if refills needed, treatment options, risks, benefits, and imponderables. American Heart Association lifestyle changes and behavioral modification discussed.  Will plan for conservative care as noted above.  All questions answered in detail.  Patient appreciative care.  Extended time of visit for discussion of atrial fibrillation types and management

## 2025-02-23 ASSESSMENT — ENCOUNTER SYMPTOMS
GASTROINTESTINAL NEGATIVE: 1
NEUROLOGICAL NEGATIVE: 1
ENDOCRINE NEGATIVE: 1
RESPIRATORY NEGATIVE: 1
ALLERGIC/IMMUNOLOGIC NEGATIVE: 1
HEMATOLOGIC/LYMPHATIC NEGATIVE: 1
MUSCULOSKELETAL NEGATIVE: 1
CARDIOVASCULAR NEGATIVE: 1
CONSTITUTIONAL NEGATIVE: 1
EYES NEGATIVE: 1
PSYCHIATRIC NEGATIVE: 1
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
## 2025-03-04 ENCOUNTER — TELEPHONE (OUTPATIENT)
Dept: CARDIOLOGY | Facility: CLINIC | Age: 70
End: 2025-03-04
Payer: MEDICARE

## 2025-03-04 ENCOUNTER — PATIENT MESSAGE (OUTPATIENT)
Dept: CARDIOLOGY | Facility: CLINIC | Age: 70
End: 2025-03-04
Payer: MEDICARE

## 2025-03-04 DIAGNOSIS — I48.11 LONGSTANDING PERSISTENT ATRIAL FIBRILLATION (MULTI): Primary | ICD-10-CM

## 2025-03-04 NOTE — PROGRESS NOTES
Pre-Procedure Patient Information    You have been scheduled for: cardioversion  At: [facility]  With: Dr. Sequeira  Date of procedure:     1. Please have transportation to and from the hospital. While you should plan for same-day discharge there is a possibility you will need to stay overnight.    2. You will receive a call from the hospital 24 - 72 hours before your procedure providing you with fasting instructions, procedure location detail, and time of arrival.  If you have not received a call from the hospital by 6 pm the day before your scheduled procedure, please call [phone].    3. Please bring a current list of medications with you to the hospital.      4. Medications to hold:   NONE     - Otherwise, you may continue your medications in the morning with sips of water.     5. Nothing to eat after midnight before the procedure. OK to take morning medications, with above exceptions, the day of the procedure with a small sip of water.     6. Please bring to our attention if you have any contrast, latex or metal allergies.    7. Please have your blood work as instructed completed at least a day before your procedure.    8. If you have any questions, please contact the office at [phone].        Orders placed for cardioversion and sent to physician for signature.

## 2025-03-04 NOTE — TELEPHONE ENCOUNTER
Called patient to schedule EKG's. Patient was unsure when she would start Flecainide and would call our office back.

## 2025-03-04 NOTE — PATIENT COMMUNICATION
Patient called back and stated she is open to doing the Cardioversion on 3/27, 3/28, or 4/4.  Please call her back and let her know if any of these dates will work. [phone]

## 2025-03-05 ENCOUNTER — TELEPHONE (OUTPATIENT)
Dept: CARDIOLOGY | Facility: CLINIC | Age: 70
End: 2025-03-05
Payer: MEDICARE

## 2025-03-05 NOTE — TELEPHONE ENCOUNTER
Patient called the office.  There was confusion about Flecainide, EKG's and the cardioversion.    Reviewed all [employer] messages with pt.  As of yesterday, she was to begin Flecainide 50mg BID per Dr. Sequeira, and then do 3 EKG's.  Cardioversion is scheduled for 3/27/25 per patient.    She did not begin Flecainide as instructed yesterday, but now understands that is what she is supposed to do.  Bean

## 2025-03-10 ENCOUNTER — APPOINTMENT (OUTPATIENT)
Dept: CARDIOLOGY | Facility: CLINIC | Age: 70
End: 2025-03-10
Payer: MEDICARE

## 2025-03-10 DIAGNOSIS — I48.11 LONGSTANDING PERSISTENT ATRIAL FIBRILLATION (MULTI): ICD-10-CM

## 2025-03-10 PROCEDURE — 93000 ELECTROCARDIOGRAM COMPLETE: CPT | Performed by: INTERNAL MEDICINE

## 2025-03-10 NOTE — PROGRESS NOTES
Pt here for EKG ordered by Dr. Sequeira for initiation of Flecainide. Per IAIN Singer, pt in afib, measurements are ok. Pt to continue same medications and keep EKG appts x 2.

## 2025-03-11 ENCOUNTER — APPOINTMENT (OUTPATIENT)
Dept: CARDIOLOGY | Facility: CLINIC | Age: 70
End: 2025-03-11
Payer: MEDICARE

## 2025-03-11 DIAGNOSIS — I48.11 LONGSTANDING PERSISTENT ATRIAL FIBRILLATION (MULTI): ICD-10-CM

## 2025-03-11 PROCEDURE — 93000 ELECTROCARDIOGRAM COMPLETE: CPT | Performed by: INTERNAL MEDICINE

## 2025-03-11 NOTE — PROGRESS NOTES
Pt here for EKG ordered by Dr Sequeira for initiation of Flecainide, afib. Per IAIN Singer, pt in afib, measurements ok. Pt to continue same meds and keep EKG appt tomorrow.

## 2025-03-12 ENCOUNTER — APPOINTMENT (OUTPATIENT)
Dept: CARDIOLOGY | Facility: CLINIC | Age: 70
End: 2025-03-12
Payer: MEDICARE

## 2025-03-12 DIAGNOSIS — I48.11 LONGSTANDING PERSISTENT ATRIAL FIBRILLATION (MULTI): ICD-10-CM

## 2025-03-12 PROCEDURE — 93000 ELECTROCARDIOGRAM COMPLETE: CPT | Performed by: INTERNAL MEDICINE

## 2025-03-12 NOTE — PROGRESS NOTES
Pt here for EKG ordered by Dr Sequeira for initiation of Flecainide, afib. Per IAIN Singer, pt in afib, measurements ok. Pt to continue meds and keep follow up appt.

## 2025-03-14 NOTE — SIGNIFICANT EVENT
PACU phase I  
Phase II  
Pt lives in [facility], amb using RW , Rollator, indep with ALD's PTA.

## 2025-03-21 ENCOUNTER — APPOINTMENT (OUTPATIENT)
Dept: ORTHOPEDIC SURGERY | Facility: CLINIC | Age: 70
End: 2025-03-21
Payer: MEDICARE

## 2025-03-27 ENCOUNTER — HOSPITAL ENCOUNTER (OUTPATIENT)
Dept: CARDIOLOGY | Facility: HOSPITAL | Age: 70
Discharge: HOME | End: 2025-03-27
Payer: MEDICARE

## 2025-03-27 ENCOUNTER — ANESTHESIA EVENT (OUTPATIENT)
Dept: CARDIOLOGY | Facility: HOSPITAL | Age: 70
End: 2025-03-27
Payer: MEDICARE

## 2025-03-27 ENCOUNTER — ANESTHESIA (OUTPATIENT)
Dept: CARDIOLOGY | Facility: HOSPITAL | Age: 70
End: 2025-03-27
Payer: MEDICARE

## 2025-03-27 VITALS
HEART RATE: 97 BPM | DIASTOLIC BLOOD PRESSURE: 100 MMHG | RESPIRATION RATE: 18 BRPM | BODY MASS INDEX: 24.41 KG/M2 | HEIGHT: 63 IN | OXYGEN SATURATION: 96 % | WEIGHT: 137.79 LBS | TEMPERATURE: 97.5 F | SYSTOLIC BLOOD PRESSURE: 149 MMHG

## 2025-03-27 DIAGNOSIS — I48.11 LONGSTANDING PERSISTENT ATRIAL FIBRILLATION (MULTI): ICD-10-CM

## 2025-03-27 LAB
ANION GAP SERPL CALC-SCNC: 14 MMOL/L (ref 10–20)
APTT PPP: 37 SECONDS (ref 26–36)
ATRIAL RATE: 72 BPM
ATRIAL RATE: 86 BPM
BODY SURFACE AREA: 1.67 M2
BUN SERPL-MCNC: 14 MG/DL (ref 6–23)
CALCIUM SERPL-MCNC: 9.4 MG/DL (ref 8.6–10.3)
CHLORIDE SERPL-SCNC: 99 MMOL/L (ref 98–107)
CO2 SERPL-SCNC: 26 MMOL/L (ref 21–32)
CREAT SERPL-MCNC: 0.74 MG/DL (ref 0.5–1.05)
EGFRCR SERPLBLD CKD-EPI 2021: 87 ML/MIN/1.73M*2
GLUCOSE SERPL-MCNC: 75 MG/DL (ref 74–99)
INR PPP: 1.5 (ref 0.9–1.1)
P AXIS: 50 DEGREES
P OFFSET: 134 MS
P ONSET: 89 MS
POTASSIUM SERPL-SCNC: 4.1 MMOL/L (ref 3.5–5.3)
PR INTERVAL: 260 MS
PROTHROMBIN TIME: 16.8 SECONDS (ref 9.8–12.4)
Q ONSET: 219 MS
Q ONSET: 221 MS
QRS COUNT: 12 BEATS
QRS COUNT: 16 BEATS
QRS DURATION: 86 MS
QRS DURATION: 88 MS
QT INTERVAL: 338 MS
QT INTERVAL: 386 MS
QTC CALCULATION(BAZETT): 422 MS
QTC CALCULATION(BAZETT): 422 MS
QTC FREDERICIA: 392 MS
QTC FREDERICIA: 410 MS
R AXIS: -35 DEGREES
R AXIS: -43 DEGREES
SODIUM SERPL-SCNC: 135 MMOL/L (ref 136–145)
T AXIS: -4 DEGREES
T AXIS: -9 DEGREES
T OFFSET: 390 MS
T OFFSET: 412 MS
VENTRICULAR RATE: 72 BPM
VENTRICULAR RATE: 94 BPM

## 2025-03-27 PROCEDURE — 92960 CARDIOVERSION ELECTRIC EXT: CPT

## 2025-03-27 PROCEDURE — 2500000001 HC RX 250 WO HCPCS SELF ADMINISTERED DRUGS (ALT 637 FOR MEDICARE OP): Performed by: NURSE PRACTITIONER

## 2025-03-27 PROCEDURE — 2500000004 HC RX 250 GENERAL PHARMACY W/ HCPCS (ALT 636 FOR OP/ED): Performed by: INTERNAL MEDICINE

## 2025-03-27 PROCEDURE — 80048 BASIC METABOLIC PNL TOTAL CA: CPT | Performed by: NURSE PRACTITIONER

## 2025-03-27 PROCEDURE — 92960 CARDIOVERSION ELECTRIC EXT: CPT | Performed by: INTERNAL MEDICINE

## 2025-03-27 PROCEDURE — 93005 ELECTROCARDIOGRAM TRACING: CPT

## 2025-03-27 PROCEDURE — 36415 COLL VENOUS BLD VENIPUNCTURE: CPT | Performed by: NURSE PRACTITIONER

## 2025-03-27 PROCEDURE — 7100000002 HC RECOVERY ROOM TIME - EACH INCREMENTAL 1 MINUTE

## 2025-03-27 PROCEDURE — 7100000001 HC RECOVERY ROOM TIME - INITIAL BASE CHARGE

## 2025-03-27 PROCEDURE — 85610 PROTHROMBIN TIME: CPT | Performed by: NURSE PRACTITIONER

## 2025-03-27 PROCEDURE — 99223 1ST HOSP IP/OBS HIGH 75: CPT | Performed by: INTERNAL MEDICINE

## 2025-03-27 PROCEDURE — 3700000012 HC SEDATION LEVEL 5+ TIME - INITIAL 15 MINUTES 5/> YEARS: Performed by: INTERNAL MEDICINE

## 2025-03-27 RX ORDER — METOPROLOL SUCCINATE 25 MG/1
25 TABLET, EXTENDED RELEASE ORAL ONCE
Status: COMPLETED | OUTPATIENT
Start: 2025-03-27 | End: 2025-03-27

## 2025-03-27 RX ORDER — METOPROLOL SUCCINATE 50 MG/1
25 TABLET, EXTENDED RELEASE ORAL DAILY
Qty: 15 TABLET | Refills: 5 | Status: SHIPPED | OUTPATIENT
Start: 2025-03-27 | End: 2025-09-23

## 2025-03-27 RX ORDER — PROPOFOL 10 MG/ML
INJECTION, EMULSION INTRAVENOUS AS NEEDED
Status: DISCONTINUED | OUTPATIENT
Start: 2025-03-27 | End: 2025-03-27 | Stop reason: HOSPADM

## 2025-03-27 RX ORDER — LEVOTHYROXINE SODIUM 75 UG/1
75 TABLET ORAL DAILY
COMMUNITY

## 2025-03-27 RX ORDER — FLECAINIDE ACETATE 50 MG/1
50 TABLET ORAL 2 TIMES DAILY
COMMUNITY

## 2025-03-27 RX ORDER — FLECAINIDE ACETATE 50 MG/1
50 TABLET ORAL ONCE
Status: COMPLETED | OUTPATIENT
Start: 2025-03-27 | End: 2025-03-27

## 2025-03-27 RX ADMIN — FLECAINIDE ACETATE 50 MG: 50 TABLET ORAL at 10:45

## 2025-03-27 RX ADMIN — PROPOFOL 50 MG: 10 INJECTION, EMULSION INTRAVENOUS at 11:22

## 2025-03-27 RX ADMIN — METOPROLOL SUCCINATE 25 MG: 25 TABLET, EXTENDED RELEASE ORAL at 10:26

## 2025-03-27 RX ADMIN — PROPOFOL 30 MG: 10 INJECTION, EMULSION INTRAVENOUS at 11:26

## 2025-03-27 RX ADMIN — PROPOFOL 20 MG: 10 INJECTION, EMULSION INTRAVENOUS at 11:24

## 2025-03-27 ASSESSMENT — PAIN SCALES - GENERAL
PAINLEVEL_OUTOF10: 0 - NO PAIN

## 2025-03-27 ASSESSMENT — ENCOUNTER SYMPTOMS
CHEST TIGHTNESS: 0
FEVER: 0
SHORTNESS OF BREATH: 0
PALPITATIONS: 0
FATIGUE: 1
CHILLS: 0

## 2025-03-27 ASSESSMENT — PAIN - FUNCTIONAL ASSESSMENT: PAIN_FUNCTIONAL_ASSESSMENT: 0-10
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
## 2025-03-27 NOTE — NURSING NOTE
Reviewed AVS discharge instructions with patient. All questions answered. Verbalizes understanding of activity restrictions, medications and follow-up appointments.

## 2025-03-31 ENCOUNTER — PATIENT MESSAGE (OUTPATIENT)
Dept: CARDIOLOGY | Facility: CLINIC | Age: 70
End: 2025-03-31
Payer: MEDICARE

## 2025-03-31 LAB
ATRIAL RATE: 72 BPM
ATRIAL RATE: 86 BPM
P AXIS: 50 DEGREES
P OFFSET: 134 MS
P ONSET: 89 MS
PR INTERVAL: 260 MS
Q ONSET: 219 MS
Q ONSET: 221 MS
QRS COUNT: 12 BEATS
QRS COUNT: 16 BEATS
QRS DURATION: 86 MS
QRS DURATION: 88 MS
QT INTERVAL: 338 MS
QT INTERVAL: 386 MS
QTC CALCULATION(BAZETT): 422 MS
QTC CALCULATION(BAZETT): 422 MS
QTC FREDERICIA: 392 MS
QTC FREDERICIA: 410 MS
R AXIS: -35 DEGREES
R AXIS: -43 DEGREES
T AXIS: -4 DEGREES
T AXIS: -9 DEGREES
T OFFSET: 390 MS
T OFFSET: 412 MS
VENTRICULAR RATE: 72 BPM
VENTRICULAR RATE: 94 BPM

## 2025-04-01 ENCOUNTER — TELEPHONE (OUTPATIENT)
Dept: CARDIOLOGY | Facility: CLINIC | Age: 70
End: 2025-04-01
Payer: MEDICARE

## 2025-04-01 NOTE — TELEPHONE ENCOUNTER
Spoke to patient and scheduled EKGs for Wednesday, Thursday and Friday.    Patient/Caregiver provided printed discharge information.

## 2025-04-02 ENCOUNTER — CLINICAL SUPPORT (OUTPATIENT)
Dept: CARDIOLOGY | Facility: CLINIC | Age: 70
End: 2025-04-02
Payer: MEDICARE

## 2025-04-02 DIAGNOSIS — I48.11 LONGSTANDING PERSISTENT ATRIAL FIBRILLATION (MULTI): ICD-10-CM

## 2025-04-02 PROCEDURE — 93000 ELECTROCARDIOGRAM COMPLETE: CPT | Performed by: INTERNAL MEDICINE

## 2025-04-02 NOTE — PROGRESS NOTES
Pt here for EKG ordered by Dr Sequeira for afib, med adjustment. Per nAisha TONG NP, pt in afib, measurements ok. Pt to continue same medications and keep EKG appt tomorrow.

## 2025-04-03 ENCOUNTER — CLINICAL SUPPORT (OUTPATIENT)
Dept: CARDIOLOGY | Facility: CLINIC | Age: 70
End: 2025-04-03
Payer: MEDICARE

## 2025-04-03 DIAGNOSIS — I48.11 LONGSTANDING PERSISTENT ATRIAL FIBRILLATION (MULTI): ICD-10-CM

## 2025-04-03 PROCEDURE — 93000 ELECTROCARDIOGRAM COMPLETE: CPT | Performed by: INTERNAL MEDICINE

## 2025-04-03 NOTE — PROGRESS NOTES
Pt here for EKG ordered by Dr Sequeira for afib, med adjustment. Per IAIN Singer, pt in afib, measurements ok. Pt to continue medications and keep EKG appt tomorrow.

## 2025-04-04 ENCOUNTER — CLINICAL SUPPORT (OUTPATIENT)
Dept: CARDIOLOGY | Facility: CLINIC | Age: 70
End: 2025-04-04
Payer: MEDICARE

## 2025-04-04 ENCOUNTER — TELEPHONE (OUTPATIENT)
Dept: CARDIOLOGY | Facility: CLINIC | Age: 70
End: 2025-04-04

## 2025-04-04 DIAGNOSIS — I48.11 LONGSTANDING PERSISTENT ATRIAL FIBRILLATION (MULTI): ICD-10-CM

## 2025-04-04 DIAGNOSIS — I48.11 LONGSTANDING PERSISTENT ATRIAL FIBRILLATION (MULTI): Primary | ICD-10-CM

## 2025-04-04 NOTE — TELEPHONE ENCOUNTER
Dr. Sequeira viewed EKG done today in office, and recommends a cardioversion next week.  Additionally, pt. Asked about an ablation.  Dr. Sequeira reviewed the last echocardiogram, and this is an option, but pt. Will need to be seen for an appointment to discuss.  The cardioversion should be done either way.  Will send  a [employer] message with this information.  Bean

## 2025-04-08 ENCOUNTER — OFFICE VISIT (OUTPATIENT)
Dept: CARDIOLOGY | Facility: CLINIC | Age: 70
End: 2025-04-08
Payer: MEDICARE

## 2025-04-08 VITALS
HEART RATE: 64 BPM | SYSTOLIC BLOOD PRESSURE: 130 MMHG | HEIGHT: 63 IN | WEIGHT: 137 LBS | DIASTOLIC BLOOD PRESSURE: 84 MMHG | BODY MASS INDEX: 24.27 KG/M2

## 2025-04-08 DIAGNOSIS — Z78.9 NEVER SMOKED CIGARETTES: ICD-10-CM

## 2025-04-08 DIAGNOSIS — Z86.79 H/O: HYPERTENSION: ICD-10-CM

## 2025-04-08 DIAGNOSIS — I48.11 LONGSTANDING PERSISTENT ATRIAL FIBRILLATION (MULTI): ICD-10-CM

## 2025-04-08 DIAGNOSIS — Z71.89 ENCOUNTER FOR MEDICATION REVIEW AND COUNSELING: ICD-10-CM

## 2025-04-08 DIAGNOSIS — I49.1 PAC (PREMATURE ATRIAL CONTRACTION): ICD-10-CM

## 2025-04-08 DIAGNOSIS — Z71.89 ENCOUNTER TO DISCUSS TREATMENT OPTIONS: ICD-10-CM

## 2025-04-08 PROCEDURE — 1157F ADVNC CARE PLAN IN RCRD: CPT | Performed by: INTERNAL MEDICINE

## 2025-04-08 PROCEDURE — 1036F TOBACCO NON-USER: CPT | Performed by: INTERNAL MEDICINE

## 2025-04-08 PROCEDURE — 1123F ACP DISCUSS/DSCN MKR DOCD: CPT | Performed by: INTERNAL MEDICINE

## 2025-04-08 PROCEDURE — 3008F BODY MASS INDEX DOCD: CPT | Performed by: INTERNAL MEDICINE

## 2025-04-08 PROCEDURE — 1159F MED LIST DOCD IN RCRD: CPT | Performed by: INTERNAL MEDICINE

## 2025-04-08 PROCEDURE — 99214 OFFICE O/P EST MOD 30 MIN: CPT | Performed by: INTERNAL MEDICINE

## 2025-04-08 RX ORDER — DILTIAZEM HYDROCHLORIDE 30 MG/1
30 TABLET, FILM COATED ORAL AS NEEDED
Qty: 90 TABLET | Refills: 3 | Status: SHIPPED | OUTPATIENT
Start: 2025-04-08 | End: 2025-04-10 | Stop reason: ALTCHOICE

## 2025-04-08 RX ORDER — FLECAINIDE ACETATE 50 MG/1
50 TABLET ORAL 2 TIMES DAILY
Qty: 270 TABLET | Refills: 3 | Status: SHIPPED | OUTPATIENT
Start: 2025-04-08 | End: 2025-04-10

## 2025-04-08 RX ORDER — CALCIUM CARBONATE 300MG(750)
400 TABLET,CHEWABLE ORAL 2 TIMES DAILY
COMMUNITY

## 2025-04-08 ASSESSMENT — ENCOUNTER SYMPTOMS
PND: 0
CLAUDICATION: 0
PALPITATIONS: 1
COUGH: 0
SNORING: 0
ORTHOPNEA: 0
SHORTNESS OF BREATH: 0
NEAR-SYNCOPE: 0
IRREGULAR HEARTBEAT: 0
WHEEZING: 0
SYNCOPE: 0

## 2025-04-08 NOTE — H&P (VIEW-ONLY)
"Chief Complaint:   Follow-up (Pt presents to office today with wanting to discuss an ablation.  Pt states that she feels worse than she has in the past two weeks.  )     History Of Present Illness:    Lee Ch is a 69 y.o. female presenting with atrial fibrillation.  She felt worse with higher dose of flecainide.  She self decreased flecainide back to 50 mg twice daily.  She is ready for repeat cardioversion.  We discussed treatment options including dilated left atrium, cryoablation PVI, hybrid therapy, continue medications post procedures, procedure outcomes, follow-up.  At this time, she declines invasive evaluation.  Brochure for cryoablation was given to the patient and reviewed with with her in detail.  Last Recorded Vitals:  Vitals:    04/08/25 1009   BP: 130/84   BP Location: Left arm   Patient Position: Sitting   Pulse: 64   Weight: 62.1 kg (137 lb)   Height: 1.6 m (5' 3\")       Past Medical History:  See list    Past Surgical History:  See list      Social History:  She reports that she has never smoked. She has never used smokeless tobacco. She reports current alcohol use of about 3.0 standard drinks of alcohol per week. She reports that she does not use drugs.    Family History:  Family History   Problem Relation Name Age of Onset    Osteoporotic fracture Mother Anne-Marie barrientos     Hyperlipidemia Mother Anne-Marie barrientos     Miscarriages / Stillbirths Mother Anne-Marie barrientos     Other (carotid artery stenosis) Father Ray     Coronary artery disease Father Ray     Other (coronary artery bypass graft) Father Ray     Peripheral vascular disease Father Ray     Abnormal EKG Father Ray     Angina Father Ray     Atrial fibrillation Father Ray     Hypertension Father Ray     Hyperlipidemia Father Ray     Atrial fibrillation Other          Allergies:  Demerol [meperidine], Adhesive tape-silicones, Sutures, Alendronate, Cysteine, Lisinopril, Nifedipine, Rivaroxaban, and Rosuvastatin    Outpatient Medications:  Current " Outpatient Medications   Medication Instructions    ALPRAZolam XR (XANAX XR) 0.5 mg, oral, As needed, Do not crush, chew, or split.    calcium carbonate 215 mg calcium (500 mg) tablet,chewable 1 tablet, Daily PRN    cholecalciferol (Vitamin D-3) 5,000 Units tablet 1 tablet, Daily    dilTIAZem (CARDIZEM) 30 mg, oral, As needed    Eliquis 5 mg, oral, 2 times daily    estradiol (Estrace) 0.01 % (0.1 mg/gram) vaginal cream Apply blueberry size amount 0.5 gram to vaginal opening with finger daily for 2 weeks, then 2-3 times per week.    ferrous sulfate 325 mg, 2 times weekly    flecainide (TAMBOCOR) 50 mg, oral, 2 times daily, May take one additional tab daily as needed for breakthrough palpitations    levothyroxine (SYNTHROID, LEVOXYL) 75 mcg, oral, Daily before breakfast    levothyroxine (SYNTHROID, LEVOXYL) 75 mcg, Daily    magnesium oxide (MAG-OX) 400 mg, 2 times daily    metoprolol succinate XL (TOPROL-XL) 25 mg, oral, Daily, Do not crush or chew. (Need to be on both flecainide and Metoprolol)    multivitamin tablet 1 tablet, Daily    omega 3-dha-epa-fish oil (Fish OiL) 1,000 mg (120 mg-180 mg) capsule 1 capsule, Daily    Prolia 60 mg, subcutaneous, Every 6 months    RED BEET ROOT-SOUR CHERRY EXT ORAL 1 tablet, Daily    red yeast rice 600 mg capsule 1 capsule, Daily    vitamins A,C,E-zinc-copper (PreserVision AREDS) 2,148 mcg-113 mg-45 mg-17.4mg tablet 1 tablet, Daily   Review of Systems   Constitutional: Negative for malaise/fatigue.   Cardiovascular:  Positive for palpitations. Negative for claudication, cyanosis, irregular heartbeat, leg swelling, near-syncope, orthopnea, paroxysmal nocturnal dyspnea and syncope.   Respiratory:  Negative for cough, shortness of breath, snoring and wheezing.    All other systems reviewed and are negative.        Physical Exam:  Constitutional:       Appearance: Normal and healthy appearance. Well-developed and not in distress.   Neck:      Vascular: No JVR. JVD normal.    Pulmonary:      Effort: Pulmonary effort is normal.      Breath sounds: Normal breath sounds. No wheezing. No rhonchi. No rales.   Chest:      Chest wall: Not tender to palpatation.   Cardiovascular:      PMI at left midclavicular line. Normal rate. Irregularly irregular rhythm. Normal S1. Normal S2.       Murmurs: There is no murmur.      No gallop.  No click. No rub.   Pulses:     Intact distal pulses.   Edema:     Peripheral edema absent.   Abdominal:      Tenderness: There is no abdominal tenderness.   Musculoskeletal: Normal range of motion.         General: No tenderness. Skin:     General: Skin is warm and dry.   Neurological:      General: No focal deficit present.      Mental Status: Alert and oriented to person, place and time.            Last Labs:  CBC -  Lab Results   Component Value Date    WBC 6.7 09/06/2024    HGB 13.6 09/06/2024    HCT 40.0 09/06/2024    MCV 95 09/06/2024     09/06/2024       CMP -  Lab Results   Component Value Date    CALCIUM 9.4 03/27/2025    PROT 6.5 09/06/2024    ALBUMIN 4.0 09/06/2024    AST 21 09/06/2024    ALT 10 09/06/2024    ALKPHOS 72 09/06/2024    BILITOT 1.2 09/06/2024       LIPID PANEL -   Lab Results   Component Value Date    CHOL 239 (H) 09/06/2024    TRIG 52 09/06/2024    .1 09/06/2024    CHHDL 2.1 09/06/2024    LDLF 126 (H) 09/01/2023    VLDL 10 09/06/2024    NHDL 123 09/06/2024       RENAL FUNCTION PANEL -   Lab Results   Component Value Date    GLUCOSE 75 03/27/2025     (L) 03/27/2025    K 4.1 03/27/2025    CL 99 03/27/2025    CO2 26 03/27/2025    ANIONGAP 14 03/27/2025    BUN 14 03/27/2025    CREATININE 0.74 03/27/2025    CALCIUM 9.4 03/27/2025    ALBUMIN 4.0 09/06/2024        Lab Results   Component Value Date    HGBA1C 3.9 05/09/2024       Last Cardiology Tests:  ECG:  ECG 12 lead (Clinic Performed) 04/03/2025        Lab review: I have personally reviewed the laboratory result(s) above    Assessment/Plan   Diagnoses and all orders for  this visit:  PAC (premature atrial contraction)  -     dilTIAZem (Cardizem) 30 mg immediate release tablet; Take 1 tablet (30 mg) by mouth if needed (DAILY).  H/O: hypertension  Longstanding persistent atrial fibrillation (Multi)  -     dilTIAZem (Cardizem) 30 mg immediate release tablet; Take 1 tablet (30 mg) by mouth if needed (DAILY).  -     flecainide (Tambocor) 50 mg tablet; Take 1 tablet (50 mg) by mouth 2 times a day. May take one additional tab daily as needed for breakthrough palpitations  Encounter to discuss treatment options  Encounter for medication review and counseling  Never smoked cigarettes  Body mass index (BMI) of 24.0 to 24.9 in adult        I, CLEMENTE SANZ LPN, AM SCRIBING FOR AND IN THE PRESENCE OF DR. ALBERTO MCNEILL MD, FACC, FACP, [facility]  I, , personally performed the services described in the documentation as scribed by the nurse in my presence, and confirm it is both accurate and complete.     Longstanding persistent atrial fibrillation, s/p [place] chemical cardioversion and multiple recurrences with propafenone then flecainide.  She self adjusted flecainide dose.  Her atrial fibrillation likely transitioning from longstanding persistent atrial fibrillation to permanent atrial fibrillation.  She declined any procedures in the past except for cardioversion.  She declined change in antiarrhythmic and hospitalization for antiarrhythmic drug loading is stored.  Also declining cryoablation PVI now, although in the past she had agreed to it.  Will opt for 1 more cardioversion and if remains in atrial fibrillation then we will continue low-dose flecainide.  Pill in pocket medications addressed.  If recurrent atrial fibrillation, will continue and allow fibrillation to transition to the permanent atrial fibrillation per her limitations on therapies.    High risk.med Eliquis - continue longterm. Refill discussed. Also discussed referral for watchman in the past and she declined in the  past and again today.  Vasodepressor syncope. Chronic. Stable. Reenforce behavioral modification including increased po fluid intake and use of compression stockings.   Other medical issues of hypertension, impaired diastolic filling, hyperlipidemia, Raynaud's, hypothyroidism, knee arthritis, and previous orthopedic surgeries noted  AHA recommendations for exercise, diet, and behavioral modification reviewed with pt.     Counseling greater than 50% of the visit performed.  The patient and I discussed types of atrial fibrillation, cardioversion, transition to permanent atrial fibrillation, rate control anticoagulation, previously declined [place] watchman trial, currently declined changing antiarrhythmic medication or cryoPVI, requested rate control anticoagulation if recurrent atrial fibrillation after cardioversion, what are indications for medications and if refills needed, treatment options, risks, benefits, and imponderables. American Heart Association lifestyle changes and behavioral modification discussed.  All questions answered in detail.  Patient appreciative care.  Extended time of visit for discussion of atrial fibrillation types and management

## 2025-04-08 NOTE — PROGRESS NOTES
"Chief Complaint:   Follow-up (Pt presents to office today with wanting to discuss an ablation.  Pt states that she feels worse than she has in the past two weeks.  )     History Of Present Illness:    Lee Ch is a 69 y.o. female presenting with atrial fibrillation.  She felt worse with higher dose of flecainide.  She self decreased flecainide back to 50 mg twice daily.  She is ready for repeat cardioversion.  We discussed treatment options including dilated left atrium, cryoablation PVI, hybrid therapy, continue medications post procedures, procedure outcomes, follow-up.  At this time, she declines invasive evaluation.  Brochure for cryoablation was given to the patient and reviewed with with her in detail.  Last Recorded Vitals:  Vitals:    04/08/25 1009   BP: 130/84   BP Location: Left arm   Patient Position: Sitting   Pulse: 64   Weight: 62.1 kg (137 lb)   Height: 1.6 m (5' 3\")       Past Medical History:  See list    Past Surgical History:  See list      Social History:  She reports that she has never smoked. She has never used smokeless tobacco. She reports current alcohol use of about 3.0 standard drinks of alcohol per week. She reports that she does not use drugs.    Family History:  Family History   Problem Relation Name Age of Onset    Osteoporotic fracture Mother Anne-Marie barrientos     Hyperlipidemia Mother Anne-Marie barrientos     Miscarriages / Stillbirths Mother Anne-Marie barrientos     Other (carotid artery stenosis) Father Ray     Coronary artery disease Father Ray     Other (coronary artery bypass graft) Father Ray     Peripheral vascular disease Father Ray     Abnormal EKG Father Ray     Angina Father Ray     Atrial fibrillation Father Ray     Hypertension Father Ray     Hyperlipidemia Father Ray     Atrial fibrillation Other          Allergies:  Demerol [meperidine], Adhesive tape-silicones, Sutures, Alendronate, Cysteine, Lisinopril, Nifedipine, Rivaroxaban, and Rosuvastatin    Outpatient Medications:  Current " Outpatient Medications   Medication Instructions    ALPRAZolam XR (XANAX XR) 0.5 mg, oral, As needed, Do not crush, chew, or split.    calcium carbonate 215 mg calcium (500 mg) tablet,chewable 1 tablet, Daily PRN    cholecalciferol (Vitamin D-3) 5,000 Units tablet 1 tablet, Daily    dilTIAZem (CARDIZEM) 30 mg, oral, As needed    Eliquis 5 mg, oral, 2 times daily    estradiol (Estrace) 0.01 % (0.1 mg/gram) vaginal cream Apply blueberry size amount 0.5 gram to vaginal opening with finger daily for 2 weeks, then 2-3 times per week.    ferrous sulfate 325 mg, 2 times weekly    flecainide (TAMBOCOR) 50 mg, oral, 2 times daily, May take one additional tab daily as needed for breakthrough palpitations    levothyroxine (SYNTHROID, LEVOXYL) 75 mcg, oral, Daily before breakfast    levothyroxine (SYNTHROID, LEVOXYL) 75 mcg, Daily    magnesium oxide (MAG-OX) 400 mg, 2 times daily    metoprolol succinate XL (TOPROL-XL) 25 mg, oral, Daily, Do not crush or chew. (Need to be on both flecainide and Metoprolol)    multivitamin tablet 1 tablet, Daily    omega 3-dha-epa-fish oil (Fish OiL) 1,000 mg (120 mg-180 mg) capsule 1 capsule, Daily    Prolia 60 mg, subcutaneous, Every 6 months    RED BEET ROOT-SOUR CHERRY EXT ORAL 1 tablet, Daily    red yeast rice 600 mg capsule 1 capsule, Daily    vitamins A,C,E-zinc-copper (PreserVision AREDS) 2,148 mcg-113 mg-45 mg-17.4mg tablet 1 tablet, Daily   Review of Systems   Constitutional: Negative for malaise/fatigue.   Cardiovascular:  Positive for palpitations. Negative for claudication, cyanosis, irregular heartbeat, leg swelling, near-syncope, orthopnea, paroxysmal nocturnal dyspnea and syncope.   Respiratory:  Negative for cough, shortness of breath, snoring and wheezing.    All other systems reviewed and are negative.        Physical Exam:  Constitutional:       Appearance: Normal and healthy appearance. Well-developed and not in distress.   Neck:      Vascular: No JVR. JVD normal.    Pulmonary:      Effort: Pulmonary effort is normal.      Breath sounds: Normal breath sounds. No wheezing. No rhonchi. No rales.   Chest:      Chest wall: Not tender to palpatation.   Cardiovascular:      PMI at left midclavicular line. Normal rate. Irregularly irregular rhythm. Normal S1. Normal S2.       Murmurs: There is no murmur.      No gallop.  No click. No rub.   Pulses:     Intact distal pulses.   Edema:     Peripheral edema absent.   Abdominal:      Tenderness: There is no abdominal tenderness.   Musculoskeletal: Normal range of motion.         General: No tenderness. Skin:     General: Skin is warm and dry.   Neurological:      General: No focal deficit present.      Mental Status: Alert and oriented to person, place and time.            Last Labs:  CBC -  Lab Results   Component Value Date    WBC 6.7 09/06/2024    HGB 13.6 09/06/2024    HCT 40.0 09/06/2024    MCV 95 09/06/2024     09/06/2024       CMP -  Lab Results   Component Value Date    CALCIUM 9.4 03/27/2025    PROT 6.5 09/06/2024    ALBUMIN 4.0 09/06/2024    AST 21 09/06/2024    ALT 10 09/06/2024    ALKPHOS 72 09/06/2024    BILITOT 1.2 09/06/2024       LIPID PANEL -   Lab Results   Component Value Date    CHOL 239 (H) 09/06/2024    TRIG 52 09/06/2024    .1 09/06/2024    CHHDL 2.1 09/06/2024    LDLF 126 (H) 09/01/2023    VLDL 10 09/06/2024    NHDL 123 09/06/2024       RENAL FUNCTION PANEL -   Lab Results   Component Value Date    GLUCOSE 75 03/27/2025     (L) 03/27/2025    K 4.1 03/27/2025    CL 99 03/27/2025    CO2 26 03/27/2025    ANIONGAP 14 03/27/2025    BUN 14 03/27/2025    CREATININE 0.74 03/27/2025    CALCIUM 9.4 03/27/2025    ALBUMIN 4.0 09/06/2024        Lab Results   Component Value Date    HGBA1C 3.9 05/09/2024       Last Cardiology Tests:  ECG:  ECG 12 lead (Clinic Performed) 04/03/2025        Lab review: I have personally reviewed the laboratory result(s) above    Assessment/Plan   Diagnoses and all orders for  this visit:  PAC (premature atrial contraction)  -     dilTIAZem (Cardizem) 30 mg immediate release tablet; Take 1 tablet (30 mg) by mouth if needed (DAILY).  H/O: hypertension  Longstanding persistent atrial fibrillation (Multi)  -     dilTIAZem (Cardizem) 30 mg immediate release tablet; Take 1 tablet (30 mg) by mouth if needed (DAILY).  -     flecainide (Tambocor) 50 mg tablet; Take 1 tablet (50 mg) by mouth 2 times a day. May take one additional tab daily as needed for breakthrough palpitations  Encounter to discuss treatment options  Encounter for medication review and counseling  Never smoked cigarettes  Body mass index (BMI) of 24.0 to 24.9 in adult        I, CLEMENTE SANZ LPN, AM SCRIBING FOR AND IN THE PRESENCE OF DR. ALBERTO MCNEILL MD, FACC, FACP, [facility]  I, , personally performed the services described in the documentation as scribed by the nurse in my presence, and confirm it is both accurate and complete.     Longstanding persistent atrial fibrillation, s/p [place] chemical cardioversion and multiple recurrences with propafenone then flecainide.  She self adjusted flecainide dose.  Her atrial fibrillation likely transitioning from longstanding persistent atrial fibrillation to permanent atrial fibrillation.  She declined any procedures in the past except for cardioversion.  She declined change in antiarrhythmic and hospitalization for antiarrhythmic drug loading is stored.  Also declining cryoablation PVI now, although in the past she had agreed to it.  Will opt for 1 more cardioversion and if remains in atrial fibrillation then we will continue low-dose flecainide.  Pill in pocket medications addressed.  If recurrent atrial fibrillation, will continue and allow fibrillation to transition to the permanent atrial fibrillation per her limitations on therapies.    High risk.med Eliquis - continue longterm. Refill discussed. Also discussed referral for watchman in the past and she declined in the  past and again today.  Vasodepressor syncope. Chronic. Stable. Reenforce behavioral modification including increased po fluid intake and use of compression stockings.   Other medical issues of hypertension, impaired diastolic filling, hyperlipidemia, Raynaud's, hypothyroidism, knee arthritis, and previous orthopedic surgeries noted  AHA recommendations for exercise, diet, and behavioral modification reviewed with pt.     Counseling greater than 50% of the visit performed.  The patient and I discussed types of atrial fibrillation, cardioversion, transition to permanent atrial fibrillation, rate control anticoagulation, previously declined [place] watchman trial, currently declined changing antiarrhythmic medication or cryoPVI, requested rate control anticoagulation if recurrent atrial fibrillation after cardioversion, what are indications for medications and if refills needed, treatment options, risks, benefits, and imponderables. American Heart Association lifestyle changes and behavioral modification discussed.  All questions answered in detail.  Patient appreciative care.  Extended time of visit for discussion of atrial fibrillation types and management

## 2025-04-08 NOTE — PATIENT INSTRUCTIONS
Cardioversion as scheduled with Dr. Sequeira   TAKE FLECAINIDE 75MG TWICE A DAY UNTIL THE CARDIOVERSION  Increase Magnesium Oxide 400mg twice a day  You may take an additional Flecainide 50mg once a day for breakthrough symptoms  You may take a Diltiazem 30mg once a day as needed for breakthrough symptoms

## 2025-04-10 ENCOUNTER — HOSPITAL ENCOUNTER (OUTPATIENT)
Dept: CARDIOLOGY | Facility: HOSPITAL | Age: 70
Discharge: HOME | End: 2025-04-10
Payer: MEDICARE

## 2025-04-10 ENCOUNTER — ANESTHESIA EVENT (OUTPATIENT)
Dept: CARDIOLOGY | Facility: HOSPITAL | Age: 70
End: 2025-04-10
Payer: MEDICARE

## 2025-04-10 ENCOUNTER — ANESTHESIA (OUTPATIENT)
Dept: CARDIOLOGY | Facility: HOSPITAL | Age: 70
End: 2025-04-10
Payer: MEDICARE

## 2025-04-10 VITALS
HEIGHT: 63 IN | RESPIRATION RATE: 16 BRPM | DIASTOLIC BLOOD PRESSURE: 72 MMHG | HEART RATE: 71 BPM | OXYGEN SATURATION: 96 % | BODY MASS INDEX: 24.26 KG/M2 | TEMPERATURE: 97.9 F | WEIGHT: 136.91 LBS | SYSTOLIC BLOOD PRESSURE: 106 MMHG

## 2025-04-10 DIAGNOSIS — I48.11 LONGSTANDING PERSISTENT ATRIAL FIBRILLATION (MULTI): ICD-10-CM

## 2025-04-10 LAB
ANION GAP SERPL CALC-SCNC: 13 MMOL/L (ref 10–20)
APTT PPP: 31 SECONDS (ref 26–36)
ATRIAL RATE: 64 BPM
ATRIAL RATE: 68 BPM
BODY SURFACE AREA: 1.66 M2
BUN SERPL-MCNC: 17 MG/DL (ref 6–23)
CALCIUM SERPL-MCNC: 9.6 MG/DL (ref 8.6–10.3)
CHLORIDE SERPL-SCNC: 96 MMOL/L (ref 98–107)
CO2 SERPL-SCNC: 27 MMOL/L (ref 21–32)
CREAT SERPL-MCNC: 0.87 MG/DL (ref 0.5–1.05)
EGFRCR SERPLBLD CKD-EPI 2021: 71 ML/MIN/1.73M*2
GLUCOSE SERPL-MCNC: 83 MG/DL (ref 74–99)
INR PPP: 1.1 (ref 0.9–1.1)
P AXIS: 53 DEGREES
P OFFSET: 119 MS
P ONSET: 69 MS
POTASSIUM SERPL-SCNC: 4.5 MMOL/L (ref 3.5–5.3)
PR INTERVAL: 298 MS
PROTHROMBIN TIME: 12.6 SECONDS (ref 9.8–12.4)
Q ONSET: 218 MS
Q ONSET: 220 MS
QRS COUNT: 11 BEATS
QRS COUNT: 12 BEATS
QRS DURATION: 84 MS
QRS DURATION: 90 MS
QT INTERVAL: 368 MS
QT INTERVAL: 410 MS
QTC CALCULATION(BAZETT): 408 MS
QTC CALCULATION(BAZETT): 435 MS
QTC FREDERICIA: 394 MS
QTC FREDERICIA: 427 MS
R AXIS: -51 DEGREES
R AXIS: 262 DEGREES
SODIUM SERPL-SCNC: 131 MMOL/L (ref 136–145)
T AXIS: 6 DEGREES
T AXIS: 9 DEGREES
T OFFSET: 404 MS
T OFFSET: 423 MS
VENTRICULAR RATE: 68 BPM
VENTRICULAR RATE: 74 BPM

## 2025-04-10 PROCEDURE — 93005 ELECTROCARDIOGRAM TRACING: CPT

## 2025-04-10 PROCEDURE — 2500000004 HC RX 250 GENERAL PHARMACY W/ HCPCS (ALT 636 FOR OP/ED): Performed by: NURSE ANESTHETIST, CERTIFIED REGISTERED

## 2025-04-10 PROCEDURE — 82374 ASSAY BLOOD CARBON DIOXIDE: CPT | Performed by: NURSE PRACTITIONER

## 2025-04-10 PROCEDURE — 92960 CARDIOVERSION ELECTRIC EXT: CPT

## 2025-04-10 PROCEDURE — 92960 CARDIOVERSION ELECTRIC EXT: CPT | Performed by: INTERNAL MEDICINE

## 2025-04-10 PROCEDURE — 3700000002 HC GENERAL ANESTHESIA TIME - EACH INCREMENTAL 1 MINUTE

## 2025-04-10 PROCEDURE — 7100000002 HC RECOVERY ROOM TIME - EACH INCREMENTAL 1 MINUTE

## 2025-04-10 PROCEDURE — 7100000001 HC RECOVERY ROOM TIME - INITIAL BASE CHARGE

## 2025-04-10 PROCEDURE — 36415 COLL VENOUS BLD VENIPUNCTURE: CPT | Performed by: NURSE PRACTITIONER

## 2025-04-10 PROCEDURE — 85730 THROMBOPLASTIN TIME PARTIAL: CPT | Performed by: NURSE PRACTITIONER

## 2025-04-10 PROCEDURE — 3700000001 HC GENERAL ANESTHESIA TIME - INITIAL BASE CHARGE

## 2025-04-10 RX ORDER — PROPOFOL 10 MG/ML
INJECTION, EMULSION INTRAVENOUS AS NEEDED
Status: DISCONTINUED | OUTPATIENT
Start: 2025-04-10 | End: 2025-04-10

## 2025-04-10 RX ORDER — METOPROLOL SUCCINATE 50 MG/1
50 TABLET, EXTENDED RELEASE ORAL DAILY
Start: 2025-04-10

## 2025-04-10 RX ORDER — FLECAINIDE ACETATE 150 MG/1
75 TABLET ORAL 2 TIMES DAILY
Qty: 30 TABLET | Refills: 5 | Status: SHIPPED | OUTPATIENT
Start: 2025-04-10 | End: 2025-10-07

## 2025-04-10 RX ADMIN — PROPOFOL 100 MG: 10 INJECTION, EMULSION INTRAVENOUS at 10:00

## 2025-04-10 ASSESSMENT — PAIN - FUNCTIONAL ASSESSMENT
PAIN_FUNCTIONAL_ASSESSMENT: 0-10

## 2025-04-10 ASSESSMENT — COLUMBIA-SUICIDE SEVERITY RATING SCALE - C-SSRS
2. HAVE YOU ACTUALLY HAD ANY THOUGHTS OF KILLING YOURSELF?: NO
6. HAVE YOU EVER DONE ANYTHING, STARTED TO DO ANYTHING, OR PREPARED TO DO ANYTHING TO END YOUR LIFE?: NO
1. IN THE PAST MONTH, HAVE YOU WISHED YOU WERE DEAD OR WISHED YOU COULD GO TO SLEEP AND NOT WAKE UP?: NO

## 2025-04-10 ASSESSMENT — PAIN SCALES - GENERAL
PAINLEVEL_OUTOF10: 0 - NO PAIN
PAIN_LEVEL: 1
PAINLEVEL_OUTOF10: 0 - NO PAIN

## 2025-04-10 NOTE — ANESTHESIA PREPROCEDURE EVALUATION
"Patient: Delaney Ch \"Pegg\"    Procedure Information       Date/Time: 04/10/25 1000    Scheduled providers: Petra Sequeira MD    Procedure: CARDIOVERSION EXTERNAL    Location: [facility]            Relevant Problems   Cardiac   (+) Atrial fibrillation (Multi)   (+) Hyperlipidemia, mixed   (+) PAC (premature atrial contraction)      Neuro   (+) Anxiety      /Renal   (+) Hyponatremia      Endocrine   (+) Hypothyroidism, adult      Musculoskeletal   (+) Scoliosis   (+) Spinal stenosis of lumbar region       Clinical information reviewed:   Tobacco  Allergies  Meds   Med Hx  Surg Hx   Fam Hx  Soc Hx        NPO Detail:  NPO/Void Status  Date of Last Liquid: 04/10/25  Time of Last Liquid: 0700  Date of Last Solid: 04/09/25  Time of Last Solid: 1930  Time of Last Void: 0759         Physical Exam    Airway  Mallampati: II  TM distance: >3 FB     Cardiovascular   Rhythm: irregular     Dental    Pulmonary    Abdominal            Anesthesia Plan    History of general anesthesia?: yes  History of complications of general anesthesia?: no    ASA 3     MAC     intravenous induction   Anesthetic plan and risks discussed with patient.    Plan discussed with attending.      "

## 2025-04-10 NOTE — INTERVAL H&P NOTE
H&P reviewed. The patient was examined and there are no changes to the H&P.    In addition,    Lab Results   Component Value Date    WBC 6.7 09/06/2024    HGB 13.6 09/06/2024    HCT 40.0 09/06/2024    MCV 95 09/06/2024     09/06/2024      Lab Results   Component Value Date    GLUCOSE 75 03/27/2025    CALCIUM 9.4 03/27/2025     (L) 03/27/2025    K 4.1 03/27/2025    CO2 26 03/27/2025    CL 99 03/27/2025    BUN 14 03/27/2025    CREATININE 0.74 03/27/2025      Lab Results   Component Value Date    INR 1.5 (H) 03/27/2025    INR 1.6 (H) 01/29/2024    INR 1.2 (H) 03/30/2022    PROTIME 16.8 (H) 03/27/2025    PROTIME 17.9 (H) 01/29/2024    PROTIME 14.0 (H) 03/30/2022      Counseling greater than 50% of visit performed.  The patient and I discussed preoperative cardiac evaluation, treatment goals for atrial fibrillation, shared decision making, informed consent, treatment options, risk, benefits, and imponderables.  All questions answered in detail.  Patient appreciative care    Total, 50 inclusive of review of bloodwork, treatment options, and discussion with Dr. Palma

## 2025-04-10 NOTE — ANESTHESIA POSTPROCEDURE EVALUATION
"Patient: Delaney Ch \"Pegg\"    Procedure Summary       Date: 04/10/25 Room / Location: [facility]    Anesthesia Start: 0938 Anesthesia Stop: 1003    Procedure: CARDIOVERSION EXTERNAL Diagnosis: Longstanding persistent atrial fibrillation (Multi)    Scheduled Providers: Petra Sequeira MD Responsible Provider: Nikki Mcarthur MD    Anesthesia Type: MAC ASA Status: 3            Anesthesia Type: MAC    Vitals Value Taken Time   /86 04/10/25 1003   Temp 36 04/10/25 1003   Pulse 70 04/10/25 1003   Resp 16 04/10/25 1003   SpO2 98 04/10/25 1003       Anesthesia Post Evaluation    Patient location during evaluation: bedside  Patient participation: complete - patient participated  Level of consciousness: awake  Pain score: 1  Pain management: adequate  Airway patency: patent  Cardiovascular status: acceptable  Respiratory status: acceptable  Hydration status: acceptable  Postoperative Nausea and Vomiting: none        There were no known notable events for this encounter.    "

## 2025-04-10 NOTE — NURSING NOTE
"  Assessment & Plan     Migraine with aura and without status migrainosus, not intractable  See pt instructions    Easy fatigability  Thyroid level normal - has appt soon with endo  Will try lower dose of amitriptyline    Has various f/u with specialties. Just booked out so won't be seen until December.  Neuro  Sleep  Covid rehab clinic        Patient Instructions   Try the lower dose (half tablet of amitriptyline) for a week or two and then message me in [employer] what you think. If that didn't help, I will send you a medication called venlafaxine instead.     Mandie Farooq MD  [facility]          Jayant Elena is a 32 year old who presents for the following health issues  accompanied by her self.    HPI     Medication Followup of amitripyline     Taking Medication as prescribed: yes    Side Effects:  Patient is to sleeping on medication     Medication Helping Symptoms:  Yes for the most part      Some improvements in terms of not really waking up with migraines usually  But \"it's making me way too tired\"  She's sleeping a lot and has a hard time getting up and then needs naps later on  Worse than the long covid symptoms  Has a consultation in December w/covid clinic and sleep clinic - they were all booked out til then. Neurology also in December    Review of Systems   Constitutional, HEENT, cardiovascular, pulmonary, gi and gu systems are negative, except as otherwise noted.      Objective    /88   Pulse 86   Temp 97.9  F (36.6  C) (Tympanic)   Resp 18   Ht 1.575 m (5' 2\")   Wt 77.1 kg (170 lb)   SpO2 98%   BMI 31.09 kg/m    Body mass index is 31.09 kg/m .  Physical Exam   GENERAL: healthy, alert and no distress  RESP: normal respiratory effort, speaking in complete sentences  MS: no gross musculoskeletal defects noted, no edema  PSYCH: mentation appears normal, affect normal/bright    Reviewed recent labs including TSH/T4        " Patient discharge instructions reviewed including increasing flecainide dosage and resuming all other home medications.  Activity restrictions including driving enforced for the next 24 hours.  Patient discharged per wheelchair with volunteer to friend in car.

## 2025-04-17 LAB
ATRIAL RATE: 64 BPM
ATRIAL RATE: 68 BPM
P AXIS: 53 DEGREES
P OFFSET: 119 MS
P ONSET: 69 MS
PR INTERVAL: 298 MS
Q ONSET: 218 MS
Q ONSET: 220 MS
QRS COUNT: 11 BEATS
QRS COUNT: 12 BEATS
QRS DURATION: 84 MS
QRS DURATION: 90 MS
QT INTERVAL: 368 MS
QT INTERVAL: 410 MS
QTC CALCULATION(BAZETT): 408 MS
QTC CALCULATION(BAZETT): 435 MS
QTC FREDERICIA: 394 MS
QTC FREDERICIA: 427 MS
R AXIS: -51 DEGREES
R AXIS: 262 DEGREES
T AXIS: 6 DEGREES
T AXIS: 9 DEGREES
T OFFSET: 404 MS
T OFFSET: 423 MS
VENTRICULAR RATE: 68 BPM
VENTRICULAR RATE: 74 BPM

## 2025-04-25 ENCOUNTER — APPOINTMENT (OUTPATIENT)
Dept: UROLOGY | Facility: CLINIC | Age: 70
End: 2025-04-25
Payer: MEDICARE

## 2025-04-30 ENCOUNTER — APPOINTMENT (OUTPATIENT)
Dept: CARDIOLOGY | Facility: CLINIC | Age: 70
End: 2025-04-30
Payer: MEDICARE

## 2025-04-30 VITALS
HEART RATE: 70 BPM | SYSTOLIC BLOOD PRESSURE: 124 MMHG | DIASTOLIC BLOOD PRESSURE: 60 MMHG | WEIGHT: 136.3 LBS | BODY MASS INDEX: 24.15 KG/M2 | HEIGHT: 63 IN

## 2025-04-30 DIAGNOSIS — I48.11 LONGSTANDING PERSISTENT ATRIAL FIBRILLATION (MULTI): ICD-10-CM

## 2025-04-30 DIAGNOSIS — I73.00 RAYNAUD'S DISEASE WITHOUT GANGRENE: ICD-10-CM

## 2025-04-30 DIAGNOSIS — Z78.9 NEVER SMOKED CIGARETTES: ICD-10-CM

## 2025-04-30 DIAGNOSIS — R55 SYNCOPE AND COLLAPSE: ICD-10-CM

## 2025-04-30 DIAGNOSIS — Z86.79 H/O: HYPERTENSION: ICD-10-CM

## 2025-04-30 DIAGNOSIS — E03.9 HYPOTHYROIDISM, ADULT: ICD-10-CM

## 2025-04-30 DIAGNOSIS — E78.2 HYPERLIPIDEMIA, MIXED: ICD-10-CM

## 2025-04-30 PROCEDURE — 1123F ACP DISCUSS/DSCN MKR DOCD: CPT | Performed by: INTERNAL MEDICINE

## 2025-04-30 PROCEDURE — 93000 ELECTROCARDIOGRAM COMPLETE: CPT | Performed by: INTERNAL MEDICINE

## 2025-04-30 PROCEDURE — 1157F ADVNC CARE PLAN IN RCRD: CPT | Performed by: INTERNAL MEDICINE

## 2025-04-30 PROCEDURE — 99214 OFFICE O/P EST MOD 30 MIN: CPT | Performed by: INTERNAL MEDICINE

## 2025-04-30 PROCEDURE — 1036F TOBACCO NON-USER: CPT | Performed by: INTERNAL MEDICINE

## 2025-04-30 PROCEDURE — 1159F MED LIST DOCD IN RCRD: CPT | Performed by: INTERNAL MEDICINE

## 2025-04-30 PROCEDURE — 3008F BODY MASS INDEX DOCD: CPT | Performed by: INTERNAL MEDICINE

## 2025-04-30 NOTE — PATIENT INSTRUCTIONS
Patient to follow up in 1 year with Dr. Russell Mondragon MD      No changes today.   Continue same medications and treatments.   Patient educated on proper medication use.   Patient educated on risk factor modification.   Please bring any lab results from other providers / physicians to your next appointment.     Please bring all medicines, vitamins, and herbal supplements with you when you come to the office.     Prescriptions will not be filled unless you are compliant with your follow up appointments or have a follow up appointment scheduled as per instruction of your physician. Refills should be requested at the time of your visit.    I, Polo Marquez RN am scribing for and in the presence of Dr. Russell Palma MD
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
## 2025-05-22 ENCOUNTER — APPOINTMENT (OUTPATIENT)
Dept: ORTHOPEDIC SURGERY | Facility: CLINIC | Age: 70
End: 2025-05-22
Payer: MEDICARE

## 2025-05-29 ENCOUNTER — HOSPITAL ENCOUNTER (OUTPATIENT)
Dept: RADIOLOGY | Facility: CLINIC | Age: 70
Discharge: HOME | End: 2025-05-29
Payer: MEDICARE

## 2025-05-29 ENCOUNTER — OFFICE VISIT (OUTPATIENT)
Dept: ORTHOPEDIC SURGERY | Facility: CLINIC | Age: 70
End: 2025-05-29
Payer: MEDICARE

## 2025-05-29 DIAGNOSIS — Z96.662 H/O TOTAL ANKLE REPLACEMENT, LEFT: ICD-10-CM

## 2025-05-29 PROCEDURE — 1036F TOBACCO NON-USER: CPT | Performed by: ORTHOPAEDIC SURGERY

## 2025-05-29 PROCEDURE — 99213 OFFICE O/P EST LOW 20 MIN: CPT | Performed by: ORTHOPAEDIC SURGERY

## 2025-05-29 PROCEDURE — 73610 X-RAY EXAM OF ANKLE: CPT | Mod: LEFT SIDE | Performed by: STUDENT IN AN ORGANIZED HEALTH CARE EDUCATION/TRAINING PROGRAM

## 2025-05-29 PROCEDURE — 1159F MED LIST DOCD IN RCRD: CPT | Performed by: ORTHOPAEDIC SURGERY

## 2025-05-29 PROCEDURE — 73610 X-RAY EXAM OF ANKLE: CPT | Mod: LT

## 2025-05-29 NOTE — PROGRESS NOTES
1 year follow-up for left total ankle.  Had significant wound healing issues over the past few months.  Just recently got her wound healed after multiple allergic-like reactions.  Having no ankle pain.  Ready to start working on her exercise program.    Exam: Moderate swelling left ankle.  Good ankle and subtalar motion.  No crepitus.    I personally reviewed the following radiographic exams: Left ankle shows well-fixed well aligned total ankle.    Assessment: Status post left total ankle.    Plan: Offered formal therapy.  She thinks she can increase her activity and exercise program at home on her own.  Continue compression stocking for swelling.  Plan follow-up x-ray left ankle 1 year.

## 2025-06-02 DIAGNOSIS — E03.9 HYPOTHYROIDISM, ADULT: ICD-10-CM

## 2025-06-02 RX ORDER — LEVOTHYROXINE SODIUM 75 UG/1
75 TABLET ORAL DAILY
Qty: 90 TABLET | Refills: 3 | Status: SHIPPED | OUTPATIENT
Start: 2025-06-02

## 2025-06-20 ENCOUNTER — APPOINTMENT (OUTPATIENT)
Dept: ORTHOPEDIC SURGERY | Facility: CLINIC | Age: 70
End: 2025-06-20
Payer: MEDICARE

## 2025-06-20 DIAGNOSIS — M19.031 ARTHRITIS OF RIGHT WRIST: ICD-10-CM

## 2025-06-20 DIAGNOSIS — G56.01 CARPAL TUNNEL SYNDROME OF RIGHT WRIST: Primary | ICD-10-CM

## 2025-06-20 PROCEDURE — 20605 DRAIN/INJ JOINT/BURSA W/O US: CPT | Mod: RT | Performed by: STUDENT IN AN ORGANIZED HEALTH CARE EDUCATION/TRAINING PROGRAM

## 2025-06-20 PROCEDURE — 99212 OFFICE O/P EST SF 10 MIN: CPT | Performed by: STUDENT IN AN ORGANIZED HEALTH CARE EDUCATION/TRAINING PROGRAM

## 2025-06-20 PROCEDURE — 2500000004 HC RX 250 GENERAL PHARMACY W/ HCPCS (ALT 636 FOR OP/ED): Performed by: STUDENT IN AN ORGANIZED HEALTH CARE EDUCATION/TRAINING PROGRAM

## 2025-06-20 RX ORDER — LIDOCAINE HYDROCHLORIDE 10 MG/ML
1 INJECTION, SOLUTION INFILTRATION; PERINEURAL
Status: COMPLETED | OUTPATIENT
Start: 2025-06-20 | End: 2025-06-20

## 2025-06-20 RX ADMIN — LIDOCAINE HYDROCHLORIDE 1 ML: 10 INJECTION, SOLUTION INFILTRATION; PERINEURAL at 10:47

## 2025-06-20 RX ADMIN — TRIAMCINOLONE ACETONIDE 10 MG: 10 INJECTION, SUSPENSION INTRA-ARTICULAR; INTRALESIONAL at 10:47

## 2025-06-20 NOTE — PROGRESS NOTES
History of Present Illness  Patient returns today for evaluation of right wrist arthritis and carpal tunnel syndrome.  Patient underwent cortisone injections 2/17/2025 with good relief of symptoms.  She has had recurrence over the last few weeks.  Patient works as a dentist.      Physical Examination:  General: Alert and oriented to person, place, and time.  No acute distress and breathing comfortably: Pleasant and cooperative with examination.  HEENT: Head is normocephalic and atraumatic.  Neck: Supple, no visible swelling.  Cardiovascular: No palpable tachycardia  Lungs: No audible wheezing or labored breathing  Abdomen: Nondistended.  On musculoskeletal examination, there is obvious swelling and tenderness to palpation over the right dorsal radiocarpal joint.  Range of motion is limited in flexion extension by pain and swelling.  Tinel's positive at right carpal tunnel.    Sensation and motor function are intact in the radial, and median nerve distribution.  The hand itself is warm and well perfused. The skin is intact throughout. The contralateral hand/wrist are normal to inspection, range of motion, stability, and strength.    M Inj/Asp: R radiocarpal on 6/20/2025 10:47 AM  Indications: pain  Details: 24 G needle, volar approach  Medications: 10 mg triamcinolone acetonide 10 mg/mL; 1 mL lidocaine 10 mg/mL (1 %)  Outcome: tolerated well, no immediate complications  Procedure, treatment alternatives, risks and benefits explained, specific risks discussed. Consent was given by the patient. Immediately prior to procedure a time out was called to verify the correct patient, procedure, equipment, support staff and site/side marked as required. Patient was prepped and draped in the usual sterile fashion.             Assessment:  Patient with right wrist arthritis and carpal tunnel syndrome.  Discussed at length with patient today, she would like to proceed with cortisone injection for arthritis and schedule  right carpal tunnel release    Plan:   Injection.  I explained the risks and benefits of an injection. Specifically, I reviewed the risks of injection, which include, but are not limited to, infection, bleeding, nerve injury, pain, steroid flare, glycemic alteration, subcutaneous fat atrophy, skin hypopigmentation, soft tissue damage, and incomplete symptom relief. At this time, the patient would like to proceed with an injection. After obtaining consent, I injected a 1mL combination of Kenalog and 1% lidocaine into right radiocarpal joint, sterile technique. The injection site was dressed, and the patient tolerated the injection well. I am hopeful that this injection will serve diagnostic, prognostic, and therapeutic purposes.    Right carpal tunnel release surgery. Based on the history and physical exam, I believe that the patient has carpal tunnel syndrome. Given the duration/severity of symptoms and the failure of non-operative treatment, the patient wants to consider carpal tunnel release.  The benefit of surgery would be to stop the progression of symptoms, with the hope that the symptoms would also resolve. The risks of surgery include, but are not limited to, infection, bleeding, nerve/vessel injury, pillar pain, continued symptoms, and anesthetic complications. The patient understands the risks/benefits and consented to the surgery.   Prior to the procedure, I discussed obtaining an EMG/NCV study to document severity and to assess prognosis. Given their classic presentation, the patient prefers to go directly to surgery without additional electrodiagnostic testing. I have answered all questions regarding the surgery itself and the post-operative course.    Follow-up 2 weeks postop      Dorina Finnegan MD

## 2025-09-23 ENCOUNTER — APPOINTMENT (OUTPATIENT)
Dept: PRIMARY CARE | Facility: CLINIC | Age: 70
End: 2025-09-23
Payer: MEDICARE

## 2025-10-10 ENCOUNTER — APPOINTMENT (OUTPATIENT)
Dept: ORTHOPEDIC SURGERY | Facility: CLINIC | Age: 70
End: 2025-10-10
Payer: MEDICARE

## 2025-10-21 ENCOUNTER — APPOINTMENT (OUTPATIENT)
Dept: CARDIOLOGY | Facility: CLINIC | Age: 70
End: 2025-10-21
Payer: MEDICARE

## 2026-04-29 ENCOUNTER — APPOINTMENT (OUTPATIENT)
Dept: CARDIOLOGY | Facility: CLINIC | Age: 71
End: 2026-04-29
Payer: MEDICARE

## (undated) DEVICE — STOCKINETTE, IMPERVIOUS, 12 X 48 IN, STERILE

## (undated) DEVICE — INSTRUMENTS SET, STAR STERILE PACK

## (undated) DEVICE — CUFF, TOURNIQUET, 30 X 4, DUAL PORT/SNGL BLADDER, DISP, LF

## (undated) DEVICE — GLOVE, SURGICAL, PROTEXIS PI MICRO, 8.0, PF, LF

## (undated) DEVICE — Device

## (undated) DEVICE — GLOVE, SURGICAL, PROTEXIS PI W/NEU-THERA, 8.0, PF, LF

## (undated) DEVICE — COVER, MINI DRAPE SET, C-ARM, FOR OEC/GE

## (undated) DEVICE — TIP, SUCTION, YANKAUER, FLEXIBLE

## (undated) DEVICE — SOLUTION, IRRIGATION, SODIUM CHLORIDE 0.9%, 1000 ML, POUR BOTTLE

## (undated) DEVICE — SUTURE, MONOCRYL, 3-0, 18 IN, PS2, UNDYED

## (undated) DEVICE — APPLICATOR, CHLORAPREP, W/ORANGE TINT, 26ML

## (undated) DEVICE — BANDAGE, COFLEX, 6 X 5 YDS, FOAM TAN, STERILE, LF

## (undated) DEVICE — BLADE, RECIPROCATING, 75 X 8 X 1.0MM

## (undated) DEVICE — DRAPE, INCISE, ANTIMICROBIAL, IOBAN 2, LARGE, 17 X 23 IN, DISPOSABLE, STERILE

## (undated) DEVICE — BANDAGE, COBAN 2, LAYER LITE COMPRESSION, 4 IN, LF

## (undated) DEVICE — SUTURE, VICRYL, 0, 27 IN, CT-1, UNDYED

## (undated) DEVICE — DRAPE, SHEET, 17 X 23 IN

## (undated) DEVICE — CLOSURE SYSTEM, DERMABOND, PRINEO, 22CM, STERILE

## (undated) DEVICE — TOWEL, SURGICAL, NEURO, O/R, 16 X 26, BLUE, STERILE

## (undated) DEVICE — COVER, MAYO STAND, W/PAD, 23 IN, DISPOSABLE, PLASTIC, LF, STERILE

## (undated) DEVICE — COVER, TABLE, 44X90

## (undated) DEVICE — DRAPE COVER, C ARM, FLOUROSCAN IMAGING SYS